# Patient Record
Sex: MALE | Race: WHITE | NOT HISPANIC OR LATINO | Employment: OTHER | URBAN - METROPOLITAN AREA
[De-identification: names, ages, dates, MRNs, and addresses within clinical notes are randomized per-mention and may not be internally consistent; named-entity substitution may affect disease eponyms.]

---

## 2018-01-25 ENCOUNTER — RECORDS - HEALTHEAST (OUTPATIENT)
Dept: LAB | Facility: CLINIC | Age: 73
End: 2018-01-25

## 2018-01-26 LAB
ALBUMIN SERPL-MCNC: 3.7 G/DL (ref 3.5–5)
ALP SERPL-CCNC: 64 U/L (ref 45–120)
ALT SERPL W P-5'-P-CCNC: 17 U/L (ref 0–45)
ANION GAP SERPL CALCULATED.3IONS-SCNC: 10 MMOL/L (ref 5–18)
AST SERPL W P-5'-P-CCNC: 14 U/L (ref 0–40)
BILIRUB SERPL-MCNC: 0.4 MG/DL (ref 0–1)
BUN SERPL-MCNC: 15 MG/DL (ref 8–28)
CALCIUM SERPL-MCNC: 9 MG/DL (ref 8.5–10.5)
CHLORIDE BLD-SCNC: 103 MMOL/L (ref 98–107)
CHOLEST SERPL-MCNC: 154 MG/DL
CO2 SERPL-SCNC: 28 MMOL/L (ref 22–31)
CREAT SERPL-MCNC: 0.72 MG/DL (ref 0.7–1.3)
CREAT UR-MCNC: 75.9 MG/DL
FASTING STATUS PATIENT QL REPORTED: NORMAL
GFR SERPL CREATININE-BSD FRML MDRD: >60 ML/MIN/1.73M2
GLUCOSE BLD-MCNC: 172 MG/DL (ref 70–125)
HDLC SERPL-MCNC: 42 MG/DL
LDLC SERPL CALC-MCNC: 96 MG/DL
MICROALBUMIN UR-MCNC: 3.65 MG/DL (ref 0–1.99)
MICROALBUMIN/CREAT UR: 48.1 MG/G
POTASSIUM BLD-SCNC: 4.6 MMOL/L (ref 3.5–5)
PROT SERPL-MCNC: 6.8 G/DL (ref 6–8)
SODIUM SERPL-SCNC: 141 MMOL/L (ref 136–145)
TRIGL SERPL-MCNC: 82 MG/DL

## 2018-09-21 ENCOUNTER — HOSPITAL ENCOUNTER (OUTPATIENT)
Dept: PHYSICAL MEDICINE AND REHAB | Facility: CLINIC | Age: 73
Discharge: HOME | End: 2018-09-21
Attending: PHYSICAL MEDICINE & REHABILITATION

## 2018-09-21 ENCOUNTER — AMBULATORY - HEALTHEAST (OUTPATIENT)
Dept: PHYSICAL MEDICINE AND REHAB | Facility: CLINIC | Age: 73
End: 2018-09-21

## 2018-09-21 DIAGNOSIS — M79.18 MYOFASCIAL PAIN: ICD-10-CM

## 2018-09-21 DIAGNOSIS — M19.041 OSTEOARTHRITIS OF BOTH HANDS, UNSPECIFIED OSTEOARTHRITIS TYPE: ICD-10-CM

## 2018-09-21 DIAGNOSIS — M54.2 NECK PAIN ON RIGHT SIDE: ICD-10-CM

## 2018-09-21 DIAGNOSIS — M54.2 NECK PAIN: ICD-10-CM

## 2018-09-21 DIAGNOSIS — M19.042 OSTEOARTHRITIS OF BOTH HANDS, UNSPECIFIED OSTEOARTHRITIS TYPE: ICD-10-CM

## 2018-09-21 DIAGNOSIS — M47.812 CERVICAL FACET JOINT SYNDROME: ICD-10-CM

## 2018-09-21 DIAGNOSIS — M47.812 FACET ARTHROPATHY, CERVICAL: ICD-10-CM

## 2018-09-21 ASSESSMENT — MIFFLIN-ST. JEOR: SCORE: 1518.71

## 2018-09-24 ENCOUNTER — HOSPITAL ENCOUNTER (OUTPATIENT)
Dept: PHYSICAL MEDICINE AND REHAB | Facility: CLINIC | Age: 73
Discharge: HOME | End: 2018-09-24
Attending: PHYSICAL MEDICINE & REHABILITATION

## 2018-09-24 DIAGNOSIS — M54.2 NECK PAIN ON RIGHT SIDE: ICD-10-CM

## 2018-09-24 DIAGNOSIS — M12.88 OTHER SPECIFIC ARTHROPATHIES, NOT ELSEWHERE CLASSIFIED, OTHER SPECIFIED SITE: ICD-10-CM

## 2018-09-24 DIAGNOSIS — Z79.4 TYPE 2 DIABETES MELLITUS WITHOUT COMPLICATION, WITH LONG-TERM CURRENT USE OF INSULIN (H): ICD-10-CM

## 2018-09-24 DIAGNOSIS — M47.812 CERVICAL FACET JOINT SYNDROME: ICD-10-CM

## 2018-09-24 DIAGNOSIS — E11.9 TYPE 2 DIABETES MELLITUS WITHOUT COMPLICATION, WITH LONG-TERM CURRENT USE OF INSULIN (H): ICD-10-CM

## 2018-09-24 DIAGNOSIS — M47.812 FACET ARTHROPATHY, CERVICAL: ICD-10-CM

## 2018-09-24 LAB — GLUCOSE BLDC GLUCOMTR-MCNC: 145 MG/DL (ref 70–125)

## 2018-09-25 ENCOUNTER — AMBULATORY - HEALTHEAST (OUTPATIENT)
Dept: PHYSICAL MEDICINE AND REHAB | Facility: CLINIC | Age: 73
End: 2018-09-25

## 2018-09-25 DIAGNOSIS — M19.041 OSTEOARTHRITIS OF BOTH HANDS, UNSPECIFIED OSTEOARTHRITIS TYPE: ICD-10-CM

## 2018-09-25 DIAGNOSIS — M19.042 OSTEOARTHRITIS OF BOTH HANDS, UNSPECIFIED OSTEOARTHRITIS TYPE: ICD-10-CM

## 2018-10-02 ENCOUNTER — OFFICE VISIT - HEALTHEAST (OUTPATIENT)
Dept: PHYSICAL THERAPY | Facility: REHABILITATION | Age: 73
End: 2018-10-02

## 2018-10-02 DIAGNOSIS — M54.2 NECK PAIN: ICD-10-CM

## 2018-10-02 DIAGNOSIS — M47.812 FACET ARTHROPATHY, CERVICAL: ICD-10-CM

## 2018-10-02 DIAGNOSIS — R29.898 DECREASED ROM OF NECK: ICD-10-CM

## 2018-10-09 ENCOUNTER — OFFICE VISIT - HEALTHEAST (OUTPATIENT)
Dept: PHYSICAL THERAPY | Facility: REHABILITATION | Age: 73
End: 2018-10-09

## 2018-10-09 DIAGNOSIS — M47.812 FACET ARTHROPATHY, CERVICAL: ICD-10-CM

## 2018-10-09 DIAGNOSIS — M54.2 NECK PAIN: ICD-10-CM

## 2018-10-09 DIAGNOSIS — R29.898 DECREASED ROM OF NECK: ICD-10-CM

## 2018-10-17 ENCOUNTER — HOSPITAL ENCOUNTER (OUTPATIENT)
Dept: PHYSICAL MEDICINE AND REHAB | Facility: CLINIC | Age: 73
Discharge: HOME | End: 2018-10-17
Attending: PHYSICAL MEDICINE & REHABILITATION

## 2018-10-17 ENCOUNTER — OFFICE VISIT - HEALTHEAST (OUTPATIENT)
Dept: OCCUPATIONAL THERAPY | Facility: REHABILITATION | Age: 73
End: 2018-10-17

## 2018-10-17 DIAGNOSIS — M47.812 FACET ARTHROPATHY, CERVICAL: ICD-10-CM

## 2018-10-17 DIAGNOSIS — M54.2 NECK PAIN ON RIGHT SIDE: ICD-10-CM

## 2018-10-17 DIAGNOSIS — R29.898 WEAKNESS OF BOTH HANDS: ICD-10-CM

## 2018-10-17 DIAGNOSIS — Z78.9 DECREASED ACTIVITIES OF DAILY LIVING (ADL): ICD-10-CM

## 2018-10-17 DIAGNOSIS — M79.641 PAIN IN BOTH HANDS: ICD-10-CM

## 2018-10-17 DIAGNOSIS — M79.642 PAIN IN BOTH HANDS: ICD-10-CM

## 2018-10-17 DIAGNOSIS — M47.812 CERVICAL FACET JOINT SYNDROME: ICD-10-CM

## 2019-05-12 ENCOUNTER — HOME CARE/HOSPICE - HEALTHEAST (OUTPATIENT)
Dept: HOME HEALTH SERVICES | Facility: HOME HEALTH | Age: 74
End: 2019-05-12

## 2019-05-18 ENCOUNTER — AMBULATORY - HEALTHEAST (OUTPATIENT)
Dept: INTENSIVE CARE | Facility: CLINIC | Age: 74
End: 2019-05-18

## 2019-05-18 ENCOUNTER — AMBULATORY - HEALTHEAST (OUTPATIENT)
Dept: MEDSURG UNIT | Facility: HOSPITAL | Age: 74
End: 2019-05-18

## 2019-05-18 DIAGNOSIS — J44.1 CHRONIC OBSTRUCTIVE PULMONARY DISEASE WITH ACUTE EXACERBATION (H): ICD-10-CM

## 2019-05-18 DIAGNOSIS — R91.8 OPACITY OF LUNG ON IMAGING STUDY: ICD-10-CM

## 2019-05-21 ENCOUNTER — COMMUNICATION - HEALTHEAST (OUTPATIENT)
Dept: PULMONOLOGY | Facility: OTHER | Age: 74
End: 2019-05-21

## 2019-05-23 LAB
ANION GAP SERPL CALCULATED.3IONS-SCNC: 5 MMOL/L (ref 3–14)
BUN SERPL-MCNC: 22 MG/DL (ref 7–30)
CALCIUM SERPL-MCNC: 8.6 MG/DL (ref 8.5–10.1)
CHLORIDE SERPL-SCNC: 107 MMOL/L (ref 94–109)
CO2 SERPL-SCNC: 28 MMOL/L (ref 20–32)
CREAT SERPL-MCNC: 0.8 MG/DL (ref 0.66–1.25)
GFR SERPL CREATININE-BSD FRML MDRD: 88 ML/MIN/{1.73_M2}
GLUCOSE SERPL-MCNC: 103 MG/DL (ref 70–99)
POTASSIUM SERPL-SCNC: 4.3 MMOL/L (ref 3.4–5.3)
SODIUM SERPL-SCNC: 140 MMOL/L (ref 133–144)

## 2019-05-23 PROCEDURE — 80048 BASIC METABOLIC PNL TOTAL CA: CPT | Performed by: FAMILY MEDICINE

## 2019-07-08 ENCOUNTER — HOSPITAL ENCOUNTER (OUTPATIENT)
Dept: CT IMAGING | Facility: HOSPITAL | Age: 74
Discharge: HOME | End: 2019-07-08
Attending: INTERNAL MEDICINE

## 2019-07-08 DIAGNOSIS — R91.8 OPACITY OF LUNG ON IMAGING STUDY: ICD-10-CM

## 2019-07-09 ENCOUNTER — RECORDS - HEALTHEAST (OUTPATIENT)
Dept: ADMINISTRATIVE | Facility: OTHER | Age: 74
End: 2019-07-09

## 2019-07-09 ENCOUNTER — AMBULATORY - HEALTHEAST (OUTPATIENT)
Dept: PULMONOLOGY | Facility: OTHER | Age: 74
End: 2019-07-09

## 2019-07-09 ENCOUNTER — OFFICE VISIT - HEALTHEAST (OUTPATIENT)
Dept: PULMONOLOGY | Facility: OTHER | Age: 74
End: 2019-07-09

## 2019-07-09 ENCOUNTER — RECORDS - HEALTHEAST (OUTPATIENT)
Dept: PULMONOLOGY | Facility: OTHER | Age: 74
End: 2019-07-09

## 2019-07-09 DIAGNOSIS — J44.1 CHRONIC OBSTRUCTIVE PULMONARY DISEASE WITH (ACUTE) EXACERBATION (H): ICD-10-CM

## 2019-07-09 DIAGNOSIS — J96.01 ACUTE RESPIRATORY FAILURE WITH HYPOXIA (H): ICD-10-CM

## 2019-07-09 DIAGNOSIS — J44.1 CHRONIC OBSTRUCTIVE PULMONARY DISEASE WITH ACUTE EXACERBATION (H): ICD-10-CM

## 2019-07-09 DIAGNOSIS — J18.9 PNEUMONIA DUE TO INFECTIOUS ORGANISM, UNSPECIFIED LATERALITY, UNSPECIFIED PART OF LUNG: ICD-10-CM

## 2019-07-09 LAB — HGB BLD-MCNC: 15.5 G/DL

## 2019-07-09 RX ORDER — ALBUTEROL SULFATE 90 UG/1
2 AEROSOL, METERED RESPIRATORY (INHALATION) EVERY 4 HOURS PRN
Qty: 1 INHALER | Refills: 6 | Status: SHIPPED | OUTPATIENT
Start: 2019-07-09 | End: 2023-02-21

## 2019-07-09 ASSESSMENT — MIFFLIN-ST. JEOR: SCORE: 1528.24

## 2019-07-25 ENCOUNTER — RECORDS - HEALTHEAST (OUTPATIENT)
Dept: LAB | Facility: CLINIC | Age: 74
End: 2019-07-25

## 2019-07-25 LAB
ALBUMIN SERPL-MCNC: 4 G/DL (ref 3.5–5)
ALP SERPL-CCNC: 63 U/L (ref 45–120)
ALT SERPL W P-5'-P-CCNC: 28 U/L (ref 0–45)
ANION GAP SERPL CALCULATED.3IONS-SCNC: 9 MMOL/L (ref 5–18)
AST SERPL W P-5'-P-CCNC: 19 U/L (ref 0–40)
BILIRUB SERPL-MCNC: 0.4 MG/DL (ref 0–1)
BUN SERPL-MCNC: 13 MG/DL (ref 8–28)
CALCIUM SERPL-MCNC: 9.5 MG/DL (ref 8.5–10.5)
CHLORIDE BLD-SCNC: 102 MMOL/L (ref 98–107)
CHOLEST SERPL-MCNC: 146 MG/DL
CO2 SERPL-SCNC: 28 MMOL/L (ref 22–31)
CREAT SERPL-MCNC: 0.81 MG/DL (ref 0.7–1.3)
FASTING STATUS PATIENT QL REPORTED: NORMAL
GFR SERPL CREATININE-BSD FRML MDRD: >60 ML/MIN/1.73M2
GLUCOSE BLD-MCNC: 161 MG/DL (ref 70–125)
HDLC SERPL-MCNC: 40 MG/DL
LDLC SERPL CALC-MCNC: 92 MG/DL
POTASSIUM BLD-SCNC: 5.1 MMOL/L (ref 3.5–5)
PROT SERPL-MCNC: 6.7 G/DL (ref 6–8)
SODIUM SERPL-SCNC: 139 MMOL/L (ref 136–145)
TRIGL SERPL-MCNC: 70 MG/DL

## 2019-11-05 ENCOUNTER — RECORDS - HEALTHEAST (OUTPATIENT)
Dept: ADMINISTRATIVE | Facility: OTHER | Age: 74
End: 2019-11-05

## 2020-01-30 ENCOUNTER — OFFICE VISIT - HEALTHEAST (OUTPATIENT)
Dept: PULMONOLOGY | Facility: OTHER | Age: 75
End: 2020-01-30

## 2020-01-30 DIAGNOSIS — J44.9 COPD, GROUP A, BY GOLD 2017 CLASSIFICATION (H): ICD-10-CM

## 2020-01-30 ASSESSMENT — MIFFLIN-ST. JEOR: SCORE: 1525.06

## 2020-11-27 ENCOUNTER — RECORDS - HEALTHEAST (OUTPATIENT)
Dept: LAB | Facility: CLINIC | Age: 75
End: 2020-11-27

## 2020-11-27 LAB
ALBUMIN SERPL-MCNC: 3.9 G/DL (ref 3.5–5)
ALP SERPL-CCNC: 75 U/L (ref 45–120)
ALT SERPL W P-5'-P-CCNC: 23 U/L (ref 0–45)
ANION GAP SERPL CALCULATED.3IONS-SCNC: 8 MMOL/L (ref 5–18)
AST SERPL W P-5'-P-CCNC: 17 U/L (ref 0–40)
BILIRUB SERPL-MCNC: 0.3 MG/DL (ref 0–1)
BUN SERPL-MCNC: 10 MG/DL (ref 8–28)
CALCIUM SERPL-MCNC: 8.8 MG/DL (ref 8.5–10.5)
CHLORIDE BLD-SCNC: 100 MMOL/L (ref 98–107)
CHOLEST SERPL-MCNC: 144 MG/DL
CO2 SERPL-SCNC: 28 MMOL/L (ref 22–31)
CREAT SERPL-MCNC: 0.91 MG/DL (ref 0.7–1.3)
FASTING STATUS PATIENT QL REPORTED: ABNORMAL
GFR SERPL CREATININE-BSD FRML MDRD: >60 ML/MIN/1.73M2
GLUCOSE BLD-MCNC: 228 MG/DL (ref 70–125)
HDLC SERPL-MCNC: 37 MG/DL
LDLC SERPL CALC-MCNC: 76 MG/DL
POTASSIUM BLD-SCNC: 4.7 MMOL/L (ref 3.5–5)
PROT SERPL-MCNC: 7 G/DL (ref 6–8)
SODIUM SERPL-SCNC: 136 MMOL/L (ref 136–145)
TRIGL SERPL-MCNC: 153 MG/DL

## 2021-02-02 ENCOUNTER — OFFICE VISIT - HEALTHEAST (OUTPATIENT)
Dept: PULMONOLOGY | Facility: OTHER | Age: 76
End: 2021-02-02

## 2021-02-02 DIAGNOSIS — J44.9 COPD, GROUP A, BY GOLD 2017 CLASSIFICATION (H): ICD-10-CM

## 2021-05-28 NOTE — PROGRESS NOTES
Received oxygen referral to [facility] via on call text page at 10:40am.    Gathered qualifying walk test, qualifying diagnosis documentation and order with qualifying diagnosis.   Called charge RT at 11am to confirm we have the necessary documentation. Patient is just waiting for home O2 to discharge.  Verified patient insurance. Called patient at 11:30am to offer choice and answer questions. Patient would like to have [facility] setup home oxygen.     ETA to bedside within 2hrs.

## 2021-05-30 NOTE — PATIENT INSTRUCTIONS - HE
It was a pleasure seeing you today.    Continue the anoro and as needed albuterol.    You no longer need oxygen, I have discontinued it and they will remove it from your home.    You received your second pneumonia vaccine today.        Gwendolyn Escobar MD  Pulmonary and Critical Care Medicine  [facility]  Office: [phone]  Pager: [phone]

## 2021-05-30 NOTE — PROGRESS NOTES
Oxygen saturation walk test    Patient oxygen saturation on RA at rest is 94%.  Oxygen saturation after ambulating 300ft on RA is 90%.      DME Provider: Regulo    Patient is ambulatory within his/her home.

## 2021-05-30 NOTE — PROGRESS NOTES
Pulmonary Clinic Follow-up Visit    Assessment & Plan:  Kelechi is a 73 y M with an 80+ pack year tobacco history, history of diabetes, HTN, HLD, GOLD A COPD/emphysema, with moderate-severe obstruction, with recent admission for CAP, status post abx treatment, and discharged on 1 LPM oxygen, who presents for follow-up.       PLAN:    Doing well on LAMA/LABA with anoro, minimal albuterol use    CT scan shows infiltrates have resolved - no further imaging indicated    Quit smoking > 15 years ago, does not qualify for screening scans    Per walking oximetry today, does not require exertional oxygen - will discontinue    Prevnar given today, and will need re-booster pneumovax in 1 year, was received before 65 yo      I will see him back in 6 months for follow-up.       Gwendolyn Escobar MD  Pulmonary and Critical Care Medicine  [facility]  Office: [phone]  Pager: [phone]    ----------------------------    HPI:   73 y M with DM, HTN, HLD, COPD/emphysema seen by myself inpatient for CAP and an AECOPD.    Flu and sputum culture were negative.     Feeling back to baseline after hospital stay. On Anoro, using daily and albuterol not needed. He was discharged on 1 LPM oxygen with exertion, that he has started using only at night.     CT scan shows interval resolution of lung infiltrates.    CAT 8 today.      ROS:  A 12-system review was obtained and was negative with the exception of the symptoms endorsed in the history of present illness.    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 1 Inhaler 0     ANORO ELLIPTA 62.5-25 mcg/actuation inhaler 1 puff daily.  5     aspirin 81 MG EC tablet Take 81 mg by mouth daily.       glipiZIDE (GLUCOTROL) 10 MG tablet Take 10 mg by mouth 2 (two) times a day before meals.       insulin glargine (BASAGLAR KWIKPEN U-100 INSULIN) 100 unit/mL (3 mL) pen Inject 44 Units under the skin at bedtime.       lisinopril (PRINIVIL,ZESTRIL) 10 MG tablet Take 10 mg by mouth daily.       metFORMIN (GLUCOPHAGE) 500 MG tablet Take 1,000 mg by mouth 2 (two) times a day with meals.       OXYGEN-AIR DELIVERY SYSTEMS [facility] [place] [place] Use As Directed. 1L at night  [place]       primidone (MYSOLINE) 50 MG tablet Take 50 mg by mouth at bedtime.       simvastatin (ZOCOR) 40 MG tablet Take 40 mg by mouth at bedtime.       tiotropium-olodaterol (STIOLTO RESPIMAT) 2.5-2.5 mcg/actuation Mist inhaler Inhale 2 Inhalation daily. 4 g 0     No current facility-administered medications for this visit.      Physical Exam:  /68   Pulse 96   Resp 24   Ht 5' 5\" (1.651 m)   Wt 192 lb 1.6 oz (87.1 kg)   SpO2 94% Comment: RA resting  BMI 31.97 kg/m    Gen: Alert, oriented, no distress, hoarse voice  HEENT: nasal turbinates are unremarkable, no oropharyngeal lesions, no cervical or supraclavicular lymphadenopathy  CV: RRR, no M/G/R  Resp: Diminished but clear, no focal crackles or wheezes  Abd: obese, soft, nontender, no palpable organomegaly  Skin: no apparent rashes  Ext: no cyanosis, clubbing or edema  Neuro: alert, nonfocal    Labs:  Reviewed    Imaging studies:  Personally reviewed:    7/8/19 CT Chest:  LUNGS AND PLEURA: Mild apical emphysema. Previously seen left upper lobe infiltrates have resolved. Scattered diminutive pulmonary nodules are unchanged.     MEDIASTINUM: Small lymph nodes are unchanged and not enlarged by size criteria. Coronary artery calcification.     LIMITED UPPER ABDOMEN: " Negative.     MUSCULOSKELETAL: Negative.     CONCLUSION:   1.  Previously seen left upper lobe infiltrates have resolved.    7/9/19 PFT's  FEV1/FVC 61 FEV1 52% FVC 64%  Neg bronchodilator response  TLC 89%  % RV/%  DLCO bjorn 71%    Moderate-severe obstruction   Neg BD response  Air trapping  Mild reduction in diffusion

## 2021-05-30 NOTE — PROGRESS NOTES
DME Provider: Regulo  Date Called: 7/9/19  Ordering Provider: Dr. Escobar  Equipment ordered: discontinue oxygen

## 2021-06-01 VITALS — HEIGHT: 65 IN | WEIGHT: 190 LBS | BODY MASS INDEX: 31.65 KG/M2

## 2021-06-03 VITALS — BODY MASS INDEX: 32.01 KG/M2 | HEIGHT: 65 IN | WEIGHT: 192.1 LBS

## 2021-06-04 VITALS
OXYGEN SATURATION: 96 % | SYSTOLIC BLOOD PRESSURE: 132 MMHG | BODY MASS INDEX: 31.89 KG/M2 | HEIGHT: 65 IN | RESPIRATION RATE: 24 BRPM | HEART RATE: 80 BPM | WEIGHT: 191.4 LBS | DIASTOLIC BLOOD PRESSURE: 68 MMHG

## 2021-06-05 VITALS
OXYGEN SATURATION: 95 % | SYSTOLIC BLOOD PRESSURE: 142 MMHG | HEART RATE: 93 BPM | WEIGHT: 192.2 LBS | BODY MASS INDEX: 31.98 KG/M2 | DIASTOLIC BLOOD PRESSURE: 72 MMHG

## 2021-06-05 NOTE — PROGRESS NOTES
Pulmonary Clinic Follow-up Visit    Assessment & Plan:  Kelechi is a 74 y M with an 80+ pack year tobacco history, history of diabetes, HTN, HLD, GOLD A COPD/emphysema, with moderate-severe obstruction, with recent admission for CAP, status post abx treatment, and discharged on 1 LPM oxygen, who presents for follow-up.    Off oxygen since his hospital stay. Doing well, no exacerbations since last visit, anoro generally working well.        PLAN:    Doing well on LAMA/LABA with anoro, minimal albuterol use. I suggested he bring his inhaler to his pharmacist to look it if he thinks it may be malfunctioning. He has not had any increase in respiratory symptoms related to this.    No exertional issues, minimal symptom burden, minimal exacerbations    Quit smoking > 15 years ago, does not qualify for screening scans    Flu vaccine was given today      I will see him back in 1 year, he will call me if any new issues arise.      Gwendolyn Escobar MD  Pulmonary and Critical Care Medicine  [facility]  Office: [phone]  Pager: [phone]    ----------------------------  CC: COPD follow-up    Interval HPI:   Not requiring albuterol. No recent exacerbations, or ER visits for breathing  No new medical updates  Feels anoro generally works well for him. Current inhaler he is questioning if medicine is being relased    ----------------------  73 y M with DM, HTN, HLD, COPD/emphysema seen by myself inpatient for CAP and an AECOPD.    Flu and sputum culture were negative.     Feeling back to baseline after hospital stay. On Anoro, using daily and albuterol not needed. He was discharged on 1 LPM oxygen with exertion, that he has started using only at night.     CT scan shows interval resolution of lung infiltrates.    CAT 8 today.      ROS:  A 12-system review was obtained and was negative with the exception of the symptoms endorsed in the history of present illness.    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  Denies alcohol or drug abuse  Lives in a town home with his wife  Has 1 dog  No recent travel    Physical Exam:  /68   Pulse 80   Resp 24   Ht 5' 5\" (1.651 m)   Wt 191 lb 6.4 oz (86.8 kg)   SpO2 96% Comment: RA  BMI 31.85 kg/m    Gen: Alert, oriented, no distress, hoarse voice  HEENT: nasal turbinates are unremarkable, no oropharyngeal lesions, no cervical or supraclavicular lymphadenopathy  CV: RRR, no M/G/R  Resp: Diminished but clear, no focal crackles or wheezes  Abd: obese, soft, nontender, no palpable organomegaly  Skin: no apparent rashes  Ext: no cyanosis, clubbing or edema  Neuro: alert, nonfocal    Labs:  Reviewed    Imaging studies:  Personally reviewed:    No new imaging    7/8/19 CT Chest:  LUNGS AND PLEURA: Mild apical emphysema. Previously seen left upper lobe infiltrates have resolved. Scattered diminutive pulmonary nodules are unchanged.     MEDIASTINUM: Small lymph nodes are unchanged and not enlarged by size criteria. Coronary artery calcification.     LIMITED UPPER ABDOMEN: Negative.     MUSCULOSKELETAL: Negative.     CONCLUSION:   1.  Previously seen left upper lobe infiltrates have resolved.    7/9/19 PFT's  FEV1/FVC 61 FEV1 52% FVC 64%  Neg bronchodilator response  TLC 89%  % RV/%  DLCO bjorn 71%    Moderate-severe obstruction   Neg BD response  Air trapping  Mild reduction in diffusion      "

## 2021-06-05 NOTE — PATIENT INSTRUCTIONS - HE
It was a pleasure seeing you today.    Continue the anoro inhaler and as needed albuterol. Stop by your pharmacist and discuss the inhaler with them if it seems it is not functioning.     You received your flu shot today.    I will see you back in 1 year. Please call me if you have breathing issues in the interim.         Gwendolyn Escobar MD  Pulmonary and Critical Care Medicine  [facility]  Office: [phone]  Pager: [phone]

## 2021-06-14 NOTE — PATIENT INSTRUCTIONS - HE
It was a pleasure to see you today.    For your COPD, I recommend you continue the anoro, I have refilled this to your pharmacy.    Continue as needed albuterol. If you are using this every day please call my office.    Follow up in 1 year      Gwendolyn Escobar MD  Pulmonary and Critical Care Medicine  [facility]  Office: [phone]

## 2021-06-14 NOTE — PROGRESS NOTES
Pulmonary Clinic Follow-up Visit    Assessment & Plan:  Kelechi is a 75 y M with an 80+ pack year tobacco history, history of diabetes, HTN, HLD, GOLD A COPD/emphysema, with moderate-severe obstruction who presents for follow-up.    Doing well, baseline exertional shortness of breath with heavy activity, no exacerbations in >1 year.       PLAN:    Doing well on LAMA/LABA with anoro, no albuterol use. 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      Gwendolyn Escobar MD  Pulmonary and Critical Care Medicine  [facility]  Office: [phone]    ----------------------------  CC: COPD follow-up    Interval HPI:   Not requiring albuterol. No recent exacerbations, or ER visits for breathing, has had a good year  No new medical updates  Continues to provide full time care for his wife at home    CAT 5    ----------------------  73 y M with DM, HTN, HLD, COPD/emphysema seen by myself inpatient for CAP and an AECOPD.    Flu and sputum culture were negative.     Feeling back to baseline after hospital stay. On Anoro, using daily and albuterol not needed. He was discharged on 1 LPM oxygen with exertion, that he has started using only at night.     CT scan shows interval resolution of lung infiltrates.    CAT 8 today.    ROS:  A 12-system review was obtained and was negative with the exception of the symptoms endorsed in the history of present illness.    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  Denies  alcohol or drug abuse  Lives in a town home with his wife  Has 1 dog  No recent travel    Physical Exam:  /72   Pulse 93   Wt 192 lb 3.2 oz (87.2 kg)   SpO2 95% Comment: RA  BMI 31.98 kg/m    Gen: Alert, oriented, no distress, hoarse voice  HEENT: nasal turbinates are unremarkable, no oropharyngeal lesions, no cervical or supraclavicular lymphadenopathy  CV: RRR, no M/G/R  Resp: Diminished but clear, no focal crackles or wheezes  Abd: obese, soft, nontender, no palpable organomegaly  Skin: no apparent rashes  Ext: no cyanosis, clubbing or edema  Neuro: alert, nonfocal    Labs:  Reviewed    Imaging studies:  Personally reviewed:    No new imaging    7/8/19 CT Chest:  LUNGS AND PLEURA: Mild apical emphysema. Previously seen left upper lobe infiltrates have resolved. Scattered diminutive pulmonary nodules are unchanged.     MEDIASTINUM: Small lymph nodes are unchanged and not enlarged by size criteria. Coronary artery calcification.     LIMITED UPPER ABDOMEN: Negative.     MUSCULOSKELETAL: Negative.     CONCLUSION:   1.  Previously seen left upper lobe infiltrates have resolved.    7/9/19 PFT's  FEV1/FVC 61 FEV1 52% FVC 64%  Neg bronchodilator response  TLC 89%  % RV/%  DLCO bjorn 71%    Moderate-severe obstruction   Neg BD response  Air trapping  Mild reduction in diffusion

## 2021-06-16 PROBLEM — I10 HYPERTENSION: Status: ACTIVE | Noted: 2019-05-11

## 2021-06-16 PROBLEM — J18.9 CAP (COMMUNITY ACQUIRED PNEUMONIA): Status: ACTIVE | Noted: 2018-07-26

## 2021-06-16 PROBLEM — A41.9 SEPSIS (H): Status: ACTIVE | Noted: 2018-07-26

## 2021-06-16 PROBLEM — E11.9 TYPE 2 DIABETES MELLITUS WITHOUT COMPLICATION, WITH LONG-TERM CURRENT USE OF INSULIN (H): Status: ACTIVE | Noted: 2018-07-26

## 2021-06-16 PROBLEM — J96.01 ACUTE RESPIRATORY FAILURE WITH HYPOXIA (H): Status: ACTIVE | Noted: 2018-07-26

## 2021-06-16 PROBLEM — Z79.4 TYPE 2 DIABETES MELLITUS WITHOUT COMPLICATION, WITH LONG-TERM CURRENT USE OF INSULIN (H): Status: ACTIVE | Noted: 2018-07-26

## 2021-06-16 PROBLEM — R00.0 SINUS TACHYCARDIA: Status: ACTIVE | Noted: 2019-05-11

## 2021-06-19 NOTE — LETTER
Letter by Gwendolyn Escobar MD at      Author: Gwendolyn Escobar MD Service: -- Author Type: --    Filed:  Encounter Date: 5/21/2019 Status: (Other)         Kelechi Kendrick  [address]  [place] [address]    May 21, 2019    Dear  Mireille,    Welcome to [facility]! Your appointment information is below.   Please bring the following to your appointment:    Insurance Card, so we may scan it for our records    Drivers license or valid ID, so we may scan it for our records    Co-pay (as applicable per your insurance plan)    A current list of your medications including over the counter products such as vitamins and supplements    Your medical records including copies of X-Ray films if you are transferring your care from another clinic.  If you do not have your records, please fill out the release of information form and we will request those records.     Provider: Gwendolyn Escobar MD  Appointment Date:   Tuesday, July 9, 2019  Arrival Time:  2:00 PFT,  3:00 dr appt.    Location: [address]        [facility], [address]    **Please allow adequate time for your commute and parking. If you are more than 10 minutes late, you may be asked to reschedule.     If you need to cancel or reschedule your appointment, please notify us at least 24 hours prior to your appointment time so we are able to make this time available for another patient.    Thank you for choosing the [facility] for your health care needs. If you have any questions, please do not hesitate to contact us at any time at   [phone]. We look forward to caring for you.     Sincerely,     [facility] staff

## 2021-06-19 NOTE — LETTER
Letter by Gwendloyn Escobar MD at      Author: Gwendolyn Escobar MD Service: -- Author Type: --    Filed:  Encounter Date: 7/9/2019 Status: (Other)         Ramiro Person MD  [address] ,  [id number]  formerly [facility] [address]                                  July 9, 2019    Patient: Kelechi Kendrick   MR Number: [id number]   YOB: 1945   Date of Visit: 7/9/2019     Dear Dr. Raza MD:    Thank you for referring Kelechi Kendrick to me for evaluation. Below are the relevant portions of my assessment and plan of care.    If you have questions, please do not hesitate to call me. I look forward to following Kelechi along with you.    Sincerely,        Gwendolyn Escobar MD          CC  No Gwendolyn Aguila MD  7/9/2019  3:44 PM  Sign at close encounter  Pulmonary Clinic Follow-up Visit    Assessment & Plan:  Kelechi is a 73 y M with an 80+ pack year tobacco history, history of diabetes, HTN, HLD, GOLD A COPD/emphysema, with moderate-severe obstruction, with recent admission for CAP, status post abx treatment, and discharged on 1 LPM oxygen, who presents for follow-up.       PLAN:    Doing well on LAMA/LABA with anoro, minimal albuterol use    CT scan shows infiltrates have resolved - no further imaging indicated    Quit smoking > 15 years ago, does not qualify for screening scans    Per walking oximetry today, does not require exertional oxygen - will discontinue    Prevnar given today, and will need re-booster pneumovax in 1 year, was received before 63 yo      I will see him back in 6 months for follow-up.       Gwendolyn Escobar MD  Pulmonary and Critical Care Medicine  [facility] [place]  Office: [phone]  Pager: [phone]    ----------------------------    HPI:   73 y M with DM, HTN, HLD, COPD/emphysema seen by myself inpatient for CAP and an AECOPD.    Flu and sputum culture were negative.     Feeling back to baseline after hospital stay. On Anoro, using daily and  "albuterol not needed. He was discharged on 1 LPM oxygen with exertion, that he has started using only at night.     CT scan shows interval resolution of lung infiltrates.    CAT 8 today.      ROS:  A 12-system review was obtained and was negative with the exception of the symptoms endorsed in the history of present illness.    PMH:  Past Medical History:   Diagnosis Date   ? Diabetes mellitus, type II (H)    ? Hyperlipidemia    ? 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 albuterol (PROAIR HFA;PROVENTIL HFA;VENTOLIN HFA) 90 mcg/actuation inhaler Inhale 2 puffs every 4 (four) hours as needed for wheezing or shortness of breath. 1 Inhaler 0   ? ANORO ELLIPTA 62.5-25 mcg/actuation inhaler 1 puff daily.  5   ? aspirin 81 MG EC tablet Take 81 mg by mouth daily.     ? glipiZIDE (GLUCOTROL) 10 MG tablet Take 10 mg by mouth 2 (two) times a day before meals.     ? insulin glargine (BASAGLAR KWIKPEN U-100 INSULIN) 100 unit/mL (3 mL) pen Inject 44 Units under the skin at bedtime.     ? lisinopril (PRINIVIL,ZESTRIL) 10 MG tablet Take 10 mg by mouth daily.     ? metFORMIN (GLUCOPHAGE) 500 MG tablet Take 1,000 mg by mouth 2 (two) times a day with meals.     ? OXYGEN-AIR DELIVERY SYSTEMS [facility] Use As Directed. 1L at night  [place]     ? primidone (MYSOLINE) 50 MG tablet Take 50 mg by mouth at bedtime.     ? simvastatin (ZOCOR) 40 MG tablet Take 40 mg by mouth at bedtime.     ? tiotropium-olodaterol (STIOLTO RESPIMAT) 2.5-2.5 mcg/actuation Mist inhaler Inhale 2 Inhalation daily. 4 g 0     No current facility-administered medications for this visit.      Physical Exam:  /68   Pulse 96   Resp 24   Ht 5' 5\" (1.651 m)   Wt 192 lb 1.6 oz (87.1 kg)   SpO2 94% " Comment: RA resting  BMI 31.97 kg/m     Gen: Alert, oriented, no distress, hoarse voice  HEENT: nasal turbinates are unremarkable, no oropharyngeal lesions, no cervical or supraclavicular lymphadenopathy  CV: RRR, no M/G/R  Resp: Diminished but clear, no focal crackles or wheezes  Abd: obese, soft, nontender, no palpable organomegaly  Skin: no apparent rashes  Ext: no cyanosis, clubbing or edema  Neuro: alert, nonfocal    Labs:  Reviewed    Imaging studies:  Personally reviewed:    7/8/19 CT Chest:  LUNGS AND PLEURA: Mild apical emphysema. Previously seen left upper lobe infiltrates have resolved. Scattered diminutive pulmonary nodules are unchanged.     MEDIASTINUM: Small lymph nodes are unchanged and not enlarged by size criteria. Coronary artery calcification.     LIMITED UPPER ABDOMEN: Negative.     MUSCULOSKELETAL: Negative.     CONCLUSION:   1.  Previously seen left upper lobe infiltrates have resolved.    7/9/19 PFT's  FEV1/FVC 61 FEV1 52% FVC 64%  Neg bronchodilator response  TLC 89%  % RV/%  DLCO bjorn 71%    Moderate-severe obstruction   Neg BD response  Air trapping  Mild reduction in diffusion
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
## 2021-06-20 NOTE — LETTER
Letter by Gwendolyn Escobar MD at      Author: Gwendolyn Escobar MD Service: -- Author Type: --    Filed:  Encounter Date: 1/30/2020 Status: (Other)         Ramiro Person MD  [address] ,  [address]  [facility] [id number]                                  January 30, 2020    Patient: Kelechi Kendrick   MR Number: [id number]   YOB: 1945   Date of Visit: 1/30/2020     Dear Dr. Raza MD:    Thank you for referring Kelechi Kendrick to me for evaluation. Below are the relevant portions of my assessment and plan of care.    If you have questions, please do not hesitate to call me. I look forward to following Kelechi along with you.    Sincerely,        Gwendolyn Escobar MD          CC  No Recipients  Gwendolyn Escobar MD  1/30/2020  2:26 PM  Sign when Signing Visit  Pulmonary Clinic Follow-up Visit    Assessment & Plan:  Kelechi is a 74 y M with an 80+ pack year tobacco history, history of diabetes, HTN, HLD, GOLD A COPD/emphysema, with moderate-severe obstruction, with recent admission for CAP, status post abx treatment, and discharged on 1 LPM oxygen, who presents for follow-up.    Off oxygen since his hospital stay. Doing well, no exacerbations since last visit, anoro generally working well.        PLAN:    Doing well on LAMA/LABA with anoro, minimal albuterol use. I suggested he bring his inhaler to his pharmacist to look it if he thinks it may be malfunctioning. He has not had any increase in respiratory symptoms related to this.    No exertional issues, minimal symptom burden, minimal exacerbations    Quit smoking > 15 years ago, does not qualify for screening scans    Flu vaccine was given today      I will see him back in 1 year, he will call me if any new issues arise.      Gwendolyn Escobar MD  Pulmonary and Critical Care Medicine  [facility]  Office: [phone]  Pager: [phone]    ----------------------------  CC: COPD follow-up    Interval HPI:   Not requiring  "albuterol. No recent exacerbations, or ER visits for breathing  No new medical updates  Feels anoro generally works well for him. Current inhaler he is questioning if medicine is being relased    ----------------------  73 y M with DM, HTN, HLD, COPD/emphysema seen by myself inpatient for CAP and an AECOPD.    Flu and sputum culture were negative.     Feeling back to baseline after hospital stay. On Anoro, using daily and albuterol not needed. He was discharged on 1 LPM oxygen with exertion, that he has started using only at night.     CT scan shows interval resolution of lung infiltrates.    CAT 8 today.      ROS:  A 12-system review was obtained and was negative with the exception of the symptoms endorsed in the history of present illness.    PMH:  Past Medical History:   Diagnosis Date   ? Diabetes mellitus, type II (H)    ? Hyperlipidemia    ? 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  Denies alcohol or drug abuse  Lives in a town home with his wife  Has 1 dog  No recent travel    Physical Exam:  /68   Pulse 80   Resp 24   Ht 5' 5\" (1.651 m)   Wt 191 lb 6.4 oz (86.8 kg)   SpO2 96% Comment: RA  BMI 31.85 kg/m     Gen: Alert, oriented, no distress, hoarse voice  HEENT: nasal turbinates are unremarkable, no oropharyngeal lesions, no cervical or supraclavicular lymphadenopathy  CV: RRR, no M/G/R  Resp: Diminished but clear, no focal crackles or wheezes  Abd: obese, soft, nontender, no palpable organomegaly  Skin: no apparent rashes  Ext: no cyanosis, clubbing or edema  Neuro: alert, nonfocal    Labs:  Reviewed    Imaging studies:  Personally reviewed:    No new imaging    7/8/19 CT Chest:  LUNGS AND PLEURA: Mild apical emphysema. Previously seen left upper lobe infiltrates have resolved. Scattered diminutive pulmonary nodules are unchanged.     MEDIASTINUM: Small lymph nodes are unchanged and not enlarged by size criteria. Coronary " artery calcification.     LIMITED UPPER ABDOMEN: Negative.     MUSCULOSKELETAL: Negative.     CONCLUSION:   1.  Previously seen left upper lobe infiltrates have resolved.    7/9/19 PFT's  FEV1/FVC 61 FEV1 52% FVC 64%  Neg bronchodilator response  TLC 89%  % RV/%  DLCO bjorn 71%    Moderate-severe obstruction   Neg BD response  Air trapping  Mild reduction in diffusion

## 2021-06-21 NOTE — PROGRESS NOTES
[facility] Discharge Summary  Patient Name: Kelechi Kendrick  Date: 11/19/2018  Referral Diagnosis: pain in both hands  Referring provider: Brook Reddy*  Visit Diagnosis:   1. Pain in both hands     2. Weakness of both hands     3. Decreased activities of daily living (ADL)         Goal status: Unable to assess as patient did not return.    Patient was seen for 1 visit. The patient discontinued therapy, did not return.    The patient will need a new referral to resume.    Thank you for your referral.  Edna Cesar  11/19/2018  10:44 AM

## 2021-06-21 NOTE — LETTER
Letter by Gwendolyn Escobar MD at      Author: Gwendolyn Escobar MD Service: -- Author Type: --    Filed:  Encounter Date: 2/2/2021 Status: (Other)         Ramiro Person MD  [address] ,  [address]  [facility] [id number]                                  February 2, 2021    Patient: Kelechi Kendrick   MR Number: [id number]   YOB: 1945   Date of Visit: 2/2/2021     Dear Dr. Raza MD:    Thank you for referring Kelechi Kendrick to me for evaluation. Below are the relevant portions of my assessment and plan of care.    If you have questions, please do not hesitate to call me. I look forward to following Kelechi along with you.    Sincerely,        Gwendolyn Escobar MD          CC  No Recipients  Gwendolyn Escobar MD  2/2/2021 10:02 AM  Sign when Signing Visit  Pulmonary Clinic Follow-up Visit    Assessment & Plan:  Kelechi is a 75 y M with an 80+ pack year tobacco history, history of diabetes, HTN, HLD, GOLD A COPD/emphysema, with moderate-severe obstruction who presents for follow-up.    Doing well, baseline exertional shortness of breath with heavy activity, no exacerbations in >1 year.       PLAN:    Doing well on LAMA/LABA with anoro, no albuterol use. 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      Gwendolyn Escobar MD  Pulmonary and Critical Care Medicine  [facility]  Office: [phone]    ----------------------------  CC: COPD follow-up    Interval HPI:   Not requiring albuterol. No recent exacerbations, or ER visits for breathing, has had a good year  No new medical updates  Continues to provide full time care for his wife at  home    CAT 5    ----------------------  73 y M with DM, HTN, HLD, COPD/emphysema seen by myself inpatient for CAP and an AECOPD.    Flu and sputum culture were negative.     Feeling back to baseline after hospital stay. On Anoro, using daily and albuterol not needed. He was discharged on 1 LPM oxygen with exertion, that he has started using only at night.     CT scan shows interval resolution of lung infiltrates.    CAT 8 today.    ROS:  A 12-system review was obtained and was negative with the exception of the symptoms endorsed in the history of present illness.    PMH:  Past Medical History:   Diagnosis Date   ? Diabetes mellitus, type II (H)    ? Hyperlipidemia    ? 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  Denies alcohol or drug abuse  Lives in a town home with his wife  Has 1 dog  No recent travel    Physical Exam:  /72   Pulse 93   Wt 192 lb 3.2 oz (87.2 kg)   SpO2 95% Comment: RA  BMI 31.98 kg/m    Gen: Alert, oriented, no distress, hoarse voice  HEENT: nasal turbinates are unremarkable, no oropharyngeal lesions, no cervical or supraclavicular lymphadenopathy  CV: RRR, no M/G/R  Resp: Diminished but clear, no focal crackles or wheezes  Abd: obese, soft, nontender, no palpable organomegaly  Skin: no apparent rashes  Ext: no cyanosis, clubbing or edema  Neuro: alert, nonfocal    Labs:  Reviewed    Imaging studies:  Personally reviewed:    No new imaging    7/8/19 CT Chest:  LUNGS AND PLEURA: Mild apical emphysema. Previously seen left upper lobe infiltrates have resolved. Scattered diminutive pulmonary nodules are unchanged.     MEDIASTINUM: Small lymph nodes are unchanged and not enlarged by size criteria. Coronary artery calcification.     LIMITED UPPER ABDOMEN: Negative.     MUSCULOSKELETAL: Negative.     CONCLUSION:   1.  Previously seen left upper lobe infiltrates have resolved.    7/9/19 PFT's  FEV1/FVC 61 FEV1 52% FVC  64%  Neg bronchodilator response  TLC 89%  % RV/%  DLCO bjorn 71%    Moderate-severe obstruction   Neg BD response  Air trapping  Mild reduction in diffusion

## 2021-06-21 NOTE — PROGRESS NOTES
[facility] Discharge Summary  Patient Name: Kelechi Kendrick  Date: 11/27/2018  Referral Diagnosis:   Neck pain on right side [M54.2]  - Primary       Cervical facet joint syndrome [M12.88]       Facet arthropathy, cervical [M12.88]       Referring provider: Brook Reddy*  Visit Diagnosis:   1. Facet arthropathy, cervical  Cervical Traction   2. Neck pain  Cervical Traction   3. Decreased ROM of neck  Cervical Traction       Goals:  Pt. will be independent with home exercise program in : 4 weeks Met  Pt. will report decreased intensity, frequency of : Pain;in 4 weeks;Comment  Comment:: 50% Met with traction unit use  Patient will decrease : NDI score;by _ points;for improved quality of function;in 6 weeks  by ___ points: 10% Not Met  Pt will: be able to turn neck without increased pain and 10 degrees improved mobility with rotation in 6 weeks: Met after stretching and tration.      Patient was seen for 2 visits from 10/2/18 to 10/9/18 with - missed appointments.    The patient met goals and has demonstrated understanding of and independence in the home program for self-care, and progression to next steps.  He will initiate contact if questions or concerns arise.    Therapy will be discontinued at this time.  The patient will need a new referral to resume.    Thank you for your referral.  [place]  11/27/2018  1:52 PM

## 2021-06-21 NOTE — PROGRESS NOTES
[facility] Certification Request    October 17, 2018      Patient: Kelechi Kendrick  MR Number: [id number]  YOB: 1945  Date of Visit: 10/17/2018      Dear Dr. Jennifer Li:    Thank you for this referral.   We are seeing Kelechi Kendrick in Occupational Therapy for bilateral OA of the hands.    Medicare and/or Medicaid requires physician review and approval of the treatment plan. Please review the plan of care and verify that you agree with the therapy plan of care by co-signing this note.      Plan of Care  Authorization / Certification Start Date: 10/17/18  Authorization / Certification End Date: 01/15/19  Authorization / Certification Number of Visits: 12  Communication with: Referral Source  Patient Related Instruction: Nature of Condition;Treatment plan and rationale;Basis of treatment;Expected outcome  Times per Week: 1  Number of Weeks: 12  Number of Visits: 12  Therapeutic Exercise: Stretching  Functional Training (ADL's): ADL's;self care;compensatory training;ergonomics  Orthotic Fitting: custom    Goals:  Patient Will Demonstrate / Verbalize independence in self-management of condition in: 4 weeks  Patient will be able to: lift;carry;reach;for grooming/hygiene;for dressing;for grocery shopping;with less pain;with less difficulty;in 12 weeks  Patient will be able to  & pinch: for dressing;for hygiene;for grooming;with less difficulty;with less pain;in 12 weeks      If you have any questions or concerns, please don't hesitate to call.    Sincerely,      Edna Cesar, OT        Physician recommendation:     ___ Follow therapist's recommendation        ___ Modify therapy      *Physician co-signature indicates they certify the need for these services furnished within this plan and while under their care.      [facility]   Upper Extremity Initial Evaluation    Patient Name: Kelechi Kendrick  Date of evaluation: 10/17/2018  Referral  "Diagnosis: OA both hands  Referring provider: Brook Reddy*  Visit Diagnosis:     ICD-10-CM    1. Pain in both hands M79.641     M79.642    2. Weakness of both hands R29.898    3. Decreased activities of daily living (ADL) R68.89        Assessment:      Patient has tightness with puckering at the base of both 4th finger volar MCP's. Patient was told many years ago that this was a \"Hebrew descent disease of the finger\". It is consistent with Dupuytren's contracture. Patient declined a splint and splinting has shown to have little or no benefit to Dupuytren's contractures. He was instructed that aggressive stretching is contraindicated and could make it worse. He was instructed to continue to perform gentle AROM. He will also perform coordination exercises to counter the effect the contracture is having on his overall hand function.    Goals:  Patient Will Demonstrate / Verbalize independence in self-management of condition in: 4 weeks  Patient will be able to: lift;carry;reach;for grooming/hygiene;for dressing;for grocery shopping;with less pain;with less difficulty;in 12 weeks  Patient will be able to  & pinch: for dressing;for hygiene;for grooming;with less difficulty;with less pain;in 12 weeks    Patient's expectations/goals are realistic.    Barriers to Learning or Achieving Goals:  No Barriers.       Plan / Patient Instructions:        Plan of Care:   Authorization / Certification Start Date: 10/17/18  Authorization / Certification End Date: 01/15/19  Authorization / Certification Number of Visits: 12  Communication with: Referral Source  Patient Related Instruction: Nature of Condition;Treatment plan and rationale;Basis of treatment;Expected outcome  Times per Week: 1  Number of Weeks: 12  Number of Visits: 12  Therapeutic Exercise: Stretching  Functional Training (ADL's): ADL's;self care;compensatory training;ergonomics  Orthotic Fitting: custom    Plan for next visit: no need for ongoing OT   "   Subjective:        Social information:   Occupation:retired   Work Status:NA     History of Present Illness:    Kelechi is a 73 y.o. male who presents to therapy today with complaints of tightness in bilateral 4th fingers that is consistent with Dupuytren's contracture. Date of onset/duration of symptoms is many years ago with worsening coordination in September 2018. Onset was gradual. Symptoms are not improving. He describes their previous level of function as limited with the same symptoms. Functional limitations are described as occurring with fine motor activities for ADL's and IADL's.    Pain rating at rest: 0  Pain rating with activity: 6       Objective:      Note: Items left blank indicates the item was not performed or not indicated at the time of the evaluation.    Patient Outcome Measures :    QuickDASH Score: 47.7    Upper Extremity Examination:  1. Pain in both hands     2. Weakness of both hands     3. Decreased activities of daily living (ADL)       Precautions/Restrictions: None  Involved side: Bilateral  Atrophy:  Absent  Color: Normal  Temperature: Normal  Edema: Absent  Palpation: No tenderness.   Scar: NA  Guarded extremity: Normal.  Sensory: Patient reports normal.      Coordination  Right   Left     NT WNL Impaired NT WNL Impaired   Gross Motor  X   X    Fine Motor   X   X   9 hole peg X   X       Hand AROM  Right   Left     NT WNL Impaired NT WNL Impaired   Full Fist  X   X    Flat Fist   X   X   Claw Fist   X   X     ROM/STRENGTH RIGHT LEFT   Note: * indicates pain AROM AROM   Shoulder Flexion - 180? WNL WNL   Shoulder Ext - 60?  WNL WNL   Shoulder Abd - 180? WNL WNL   Elbow Flexion - 150? WNL WNL   Elbow Extension - 0?    WNL WNL   Forearm Supination - 80? WNL WNL   Forearm Pronation - 80? WNL WNL   Wrist Flexion - 65-80?  WNL WNL   Wrist Extension - 65-80? WNL WNL   Ulnar Deviation - 20-35?     Radial Deviation - 10-20?      Strength 80# 84#   3 Point Pinch 11# 12#   Lateral Pinch        May benefit from PT evaluation: No    U/E orthosis currently:   No    Treatment Today: Patient instructed on fine motor activities including manipulating super ball between fingers, shuffle cards, buttoning etc.  TREATMENT MINUTES COMMENTS   Evaluation 25    Self-care/ Home management     Manual therapy     Neuromuscular Re-education     Orthotic fitting     Therapeutic Exercises 15    Iontophoresis           Total 40    Blank areas are intentional and mean the treatment did not include these items.     GOALS AND PLAN OF CARE WERE ESTABLISHED IN COOPERATION WITH THE PATIENT    OT Evaluation Code: (Please list factors)   Comorbidities: HTN, HLD, T2DM  Profile/History Review: Brief    Need for eval modification: No    # Treatment options: Limited    Clinical Decision Making:  Low      Occupational Profile/ Medical and Therapy History and Comorbidities Occupational Performance Clinical Decision Making   (Complexity)   brief history with review of medical/therapy records related to the presenting problem.  No comorbidities 1-3 Performance deficits that result in activity limitations and/or participation restrictions.    No Assessment Modification  Low complexity, which includes  problem-focused assessments, and consideration of a limited number of treatment options.      expanded review of medical/therapy records and additional review of physical, cognitive and psychosocial history.    May have comorbidities 3-5 Performance deficits that result in activity limitations and/or participation restrictions.    Minimal to moderate modification of assessment Moderate complexity, which includes analysis of data from detailed assessments, and consideration of several treatment options.         Review of medical/therapy records and extensive additional review of physical, cognitive and psychosocial history.  Comorbidities affect occupational performance 5 or more Performance deficits that result in activity limitations and/or  participation restrictions.    Significant modification of assessment High complexity, analysis of  Occupational profile and data,  Comprehensive assessments, multiple treatment options.            Edna Cesar  10/17/2018  11:04 AM

## 2021-12-10 ENCOUNTER — LAB REQUISITION (OUTPATIENT)
Dept: LAB | Facility: CLINIC | Age: 76
End: 2021-12-10
Payer: MEDICARE

## 2021-12-10 DIAGNOSIS — I10 ESSENTIAL (PRIMARY) HYPERTENSION: ICD-10-CM

## 2021-12-10 DIAGNOSIS — E78.2 MIXED HYPERLIPIDEMIA: ICD-10-CM

## 2021-12-10 LAB
ALBUMIN SERPL-MCNC: 3.8 G/DL (ref 3.5–5)
ALP SERPL-CCNC: 75 U/L (ref 45–120)
ALT SERPL W P-5'-P-CCNC: 19 U/L (ref 0–45)
ANION GAP SERPL CALCULATED.3IONS-SCNC: 9 MMOL/L (ref 5–18)
AST SERPL W P-5'-P-CCNC: 17 U/L (ref 0–40)
BILIRUB SERPL-MCNC: 0.4 MG/DL (ref 0–1)
BUN SERPL-MCNC: 10 MG/DL (ref 8–28)
CALCIUM SERPL-MCNC: 9.1 MG/DL (ref 8.5–10.5)
CHLORIDE BLD-SCNC: 102 MMOL/L (ref 98–107)
CHOLEST SERPL-MCNC: 116 MG/DL
CO2 SERPL-SCNC: 28 MMOL/L (ref 22–31)
CREAT SERPL-MCNC: 0.78 MG/DL (ref 0.7–1.3)
GFR SERPL CREATININE-BSD FRML MDRD: 87 ML/MIN/1.73M2
GLUCOSE BLD-MCNC: 172 MG/DL (ref 70–125)
HDLC SERPL-MCNC: 35 MG/DL
LDLC SERPL CALC-MCNC: 63 MG/DL
POTASSIUM BLD-SCNC: 4.5 MMOL/L (ref 3.5–5)
PROT SERPL-MCNC: 6.8 G/DL (ref 6–8)
SODIUM SERPL-SCNC: 139 MMOL/L (ref 136–145)
TRIGL SERPL-MCNC: 88 MG/DL

## 2021-12-10 PROCEDURE — 80061 LIPID PANEL: CPT | Mod: ORL | Performed by: FAMILY MEDICINE

## 2021-12-10 PROCEDURE — 80053 COMPREHEN METABOLIC PANEL: CPT | Mod: ORL | Performed by: FAMILY MEDICINE

## 2022-02-14 DIAGNOSIS — J44.9 COPD, GROUP A, BY GOLD 2017 CLASSIFICATION (H): ICD-10-CM

## 2022-02-21 ENCOUNTER — OFFICE VISIT (OUTPATIENT)
Dept: PULMONOLOGY | Facility: OTHER | Age: 77
End: 2022-02-21
Payer: MEDICARE

## 2022-02-21 VITALS
OXYGEN SATURATION: 96 % | BODY MASS INDEX: 32.4 KG/M2 | HEART RATE: 68 BPM | RESPIRATION RATE: 12 BRPM | SYSTOLIC BLOOD PRESSURE: 130 MMHG | DIASTOLIC BLOOD PRESSURE: 72 MMHG | WEIGHT: 194.7 LBS

## 2022-02-21 DIAGNOSIS — J44.9 COPD, GROUP A, BY GOLD 2017 CLASSIFICATION (H): ICD-10-CM

## 2022-02-21 PROCEDURE — 99213 OFFICE O/P EST LOW 20 MIN: CPT | Performed by: INTERNAL MEDICINE

## 2022-02-21 NOTE — PROGRESS NOTES
Pulmonary Clinic Follow-up Visit    Assessment & Plan:  Kelechi is a 76 y M with an 80+ pack year tobacco history, history of diabetes, HTN, HLD, GOLD A COPD/emphysema, with moderate-severe obstruction who presents for follow-up.    Doing well, baseline exertional shortness of breath with heavy activity, no exacerbations in >1 year.       Recommendations:    Continues to do well on anoro, no albuterol use. Meds all refilled today.     Mild exertional issues, minimal symptom burden, minimal exacerbations    Quit smoking > 15 years ago, does not qualify for screening scans    Up to date on vaccinations including COVID series     Provided action plan previously      I will see him back in 1 year, he will call me if any new issues arise.      Gwendolyn Escobar MD  Pulmonary and Critical Care Medicine  [facility]  Office: [phone]    ----------------------------    CC: COPD follow-up    Interval HPI:   Wife passed over the winter    Not requiring albuterol, using anoro daily  No recent exacerbations, or ER visits for breathing, has had a good year  No new medical updates  Exertional dyspnea stable/unchanged    ----------------------  73 y M with DM, HTN, HLD, COPD/emphysema seen by myself inpatient for CAP and an AECOPD.    Flu and sputum culture were negative.     Feeling back to baseline after hospital stay. On Anoro, using daily and albuterol not needed. He was discharged on 1 LPM oxygen with exertion, that he has started using only at night.     CT scan shows interval resolution of lung infiltrates.    CAT 8 today.    ROS:  A 12-system review was obtained and was negative with the exception of the symptoms endorsed in the history of present illness.    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  Denies alcohol or drug abuse  Lives  in a town home with his wife  Has 1 dog  No recent travel    Physical Exam:  /72 (BP Location: Left arm, Patient Position: Chair, Cuff Size: Adult Regular)   Pulse 68   Resp 12   Wt 88.3 kg (194 lb 11.2 oz)   SpO2 96%   BMI 32.40 kg/m    Gen: Alert, oriented, no distress, hoarse voice  HEENT: nasal turbinates are unremarkable, no oropharyngeal lesions, no cervical or supraclavicular lymphadenopathy  CV: RRR, no M/G/R  Resp: Diminished but clear, no focal crackles or wheezes  Abd: obese, soft, nontender, no palpable organomegaly  Skin: no apparent rashes  Ext: no cyanosis, clubbing or edema  Neuro: alert, nonfocal    Labs:  Reviewed    Imaging studies:  Personally reviewed:    No new imaging    7/8/19 CT Chest:  LUNGS AND PLEURA: Mild apical emphysema. Previously seen left upper lobe infiltrates have resolved. Scattered diminutive pulmonary nodules are unchanged.     MEDIASTINUM: Small lymph nodes are unchanged and not enlarged by size criteria. Coronary artery calcification.     LIMITED UPPER ABDOMEN: Negative.     MUSCULOSKELETAL: Negative.     CONCLUSION:   1.  Previously seen left upper lobe infiltrates have resolved.      7/9/19 PFT's  FEV1/FVC 61 FEV1 52% FVC 64%  Neg bronchodilator response  TLC 89%  % RV/%  DLCO bjorn 71%    Moderate-severe obstruction   Neg BD response  Air trapping  Mild reduction in diffusion

## 2022-02-21 NOTE — LETTER
2/21/2022         RE: Kelechi Kendrick  [address] [facility] [address]        Dear Colleague,    Thank you for referring your patient, Kelechi Kendrick, to the [facility]. Please see a copy of my visit note below.    Pulmonary Clinic Follow-up Visit    Assessment & Plan:  Kelechi is a 76 y M with an 80+ pack year tobacco history, history of diabetes, HTN, HLD, GOLD A COPD/emphysema, with moderate-severe obstruction who presents for follow-up.    Doing well, baseline exertional shortness of breath with heavy activity, no exacerbations in >1 year.       Recommendations:    Continues to do well on anoro, no albuterol use. Meds all refilled today.     Mild exertional issues, minimal symptom burden, minimal exacerbations    Quit smoking > 15 years ago, does not qualify for screening scans    Up to date on vaccinations including COVID series     Provided action plan previously      I will see him back in 1 year, he will call me if any new issues arise.      Gwendolyn Escobar MD  Pulmonary and Critical Care Medicine  [facility]  Office: [phone]    ----------------------------    CC: COPD follow-up    Interval HPI:   Wife passed over the winter    Not requiring albuterol, using anoro daily  No recent exacerbations, or ER visits for breathing, has had a good year  No new medical updates  Exertional dyspnea stable/unchanged    ----------------------  73 y M with DM, HTN, HLD, COPD/emphysema seen by myself inpatient for CAP and an AECOPD.    Flu and sputum culture were negative.     Feeling back to baseline after hospital stay. On Anoro, using daily and albuterol not needed. He was discharged on 1 LPM oxygen with exertion, that he has started using only at night.     CT scan shows interval resolution of lung infiltrates.    CAT 8 today.    ROS:  A 12-system review was obtained and was negative with the exception of the symptoms endorsed in the history of present  illness.    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  Denies alcohol or drug abuse  Lives in a town home with his wife  Has 1 dog  No recent travel    Physical Exam:  /72 (BP Location: Left arm, Patient Position: Chair, Cuff Size: Adult Regular)   Pulse 68   Resp 12   Wt 88.3 kg (194 lb 11.2 oz)   SpO2 96%   BMI 32.40 kg/m    Gen: Alert, oriented, no distress, hoarse voice  HEENT: nasal turbinates are unremarkable, no oropharyngeal lesions, no cervical or supraclavicular lymphadenopathy  CV: RRR, no M/G/R  Resp: Diminished but clear, no focal crackles or wheezes  Abd: obese, soft, nontender, no palpable organomegaly  Skin: no apparent rashes  Ext: no cyanosis, clubbing or edema  Neuro: alert, nonfocal    Labs:  Reviewed    Imaging studies:  Personally reviewed:    No new imaging    7/8/19 CT Chest:  LUNGS AND PLEURA: Mild apical emphysema. Previously seen left upper lobe infiltrates have resolved. Scattered diminutive pulmonary nodules are unchanged.     MEDIASTINUM: Small lymph nodes are unchanged and not enlarged by size criteria. Coronary artery calcification.     LIMITED UPPER ABDOMEN: Negative.     MUSCULOSKELETAL: Negative.     CONCLUSION:   1.  Previously seen left upper lobe infiltrates have resolved.      7/9/19 PFT's  FEV1/FVC 61 FEV1 52% FVC 64%  Neg bronchodilator response  TLC 89%  % RV/%  DLCO bjorn 71%    Moderate-severe obstruction   Neg BD response  Air trapping  Mild reduction in diffusion            Again, thank you for allowing me to participate in the care of your patient.        Sincerely,        Gwendolyn Escobar MD

## 2022-02-21 NOTE — PATIENT INSTRUCTIONS
It was a pleasure to see you today.    I have refilled your anoro today. Continue daily.   It sounds like your lungs are doing very well.       We can follow up in 1 year. Contact me before that if any new issues.       Gwendolyn Escobar MD  Pulmonary and Critical Care Medicine  [facility]  Office: [phone]

## 2022-12-09 ENCOUNTER — LAB REQUISITION (OUTPATIENT)
Dept: LAB | Facility: CLINIC | Age: 77
End: 2022-12-09
Payer: MEDICARE

## 2022-12-09 DIAGNOSIS — E78.2 MIXED HYPERLIPIDEMIA: ICD-10-CM

## 2022-12-09 DIAGNOSIS — I10 ESSENTIAL (PRIMARY) HYPERTENSION: ICD-10-CM

## 2022-12-09 PROCEDURE — 80053 COMPREHEN METABOLIC PANEL: CPT | Mod: ORL | Performed by: FAMILY MEDICINE

## 2022-12-09 PROCEDURE — 80061 LIPID PANEL: CPT | Mod: ORL | Performed by: FAMILY MEDICINE

## 2022-12-10 LAB
ALBUMIN SERPL BCG-MCNC: 4.5 G/DL (ref 3.5–5.2)
ALP SERPL-CCNC: 84 U/L (ref 40–129)
ALT SERPL W P-5'-P-CCNC: 36 U/L (ref 10–50)
ANION GAP SERPL CALCULATED.3IONS-SCNC: 14 MMOL/L (ref 7–15)
AST SERPL W P-5'-P-CCNC: 30 U/L (ref 10–50)
BILIRUB SERPL-MCNC: 0.4 MG/DL
BUN SERPL-MCNC: 15.1 MG/DL (ref 8–23)
CALCIUM SERPL-MCNC: 9 MG/DL (ref 8.8–10.2)
CHLORIDE SERPL-SCNC: 100 MMOL/L (ref 98–107)
CHOLEST SERPL-MCNC: 141 MG/DL
CREAT SERPL-MCNC: 0.82 MG/DL (ref 0.67–1.17)
DEPRECATED HCO3 PLAS-SCNC: 25 MMOL/L (ref 22–29)
GFR SERPL CREATININE-BSD FRML MDRD: 90 ML/MIN/1.73M2
GLUCOSE SERPL-MCNC: 158 MG/DL (ref 70–99)
HDLC SERPL-MCNC: 43 MG/DL
LDLC SERPL CALC-MCNC: 76 MG/DL
NONHDLC SERPL-MCNC: 98 MG/DL
POTASSIUM SERPL-SCNC: 4.9 MMOL/L (ref 3.4–5.3)
PROT SERPL-MCNC: 7.1 G/DL (ref 6.4–8.3)
SODIUM SERPL-SCNC: 139 MMOL/L (ref 136–145)
TRIGL SERPL-MCNC: 111 MG/DL

## 2023-02-21 ENCOUNTER — OFFICE VISIT (OUTPATIENT)
Dept: PULMONOLOGY | Facility: CLINIC | Age: 78
End: 2023-02-21
Payer: MEDICARE

## 2023-02-21 VITALS
DIASTOLIC BLOOD PRESSURE: 78 MMHG | BODY MASS INDEX: 31.35 KG/M2 | OXYGEN SATURATION: 95 % | SYSTOLIC BLOOD PRESSURE: 134 MMHG | WEIGHT: 188.4 LBS | HEART RATE: 64 BPM

## 2023-02-21 DIAGNOSIS — J44.9 CHRONIC OBSTRUCTIVE PULMONARY DISEASE, UNSPECIFIED COPD TYPE (H): ICD-10-CM

## 2023-02-21 DIAGNOSIS — J44.9 COPD, GROUP A, BY GOLD 2017 CLASSIFICATION (H): ICD-10-CM

## 2023-02-21 PROCEDURE — 99213 OFFICE O/P EST LOW 20 MIN: CPT | Performed by: INTERNAL MEDICINE

## 2023-02-21 NOTE — PROGRESS NOTES
Pulmonary Clinic Follow-up Visit    Assessment:  76yoM with 80 pack year smoking history, GOLD A COPD here for follow up.         Plan:  Not eligible for screening--quit >15 years ago  Continue Anoro  Action plan in place  Immunization up to date  Skin abscess--referred back to Dr. Pesron's office for drainage before it enlarges further.       Swati Bravo MD  Pulmonary/Critical Care    ----------------------------    CCx: COPD    HPI: 77yoM with COPD and former smoker who was initially established after hospitalization for NURY pneumonia previously patient of Dr. Escobar here for follow up. Last seen 2/2023.    Weaned off of oxygen.     Short of breath when shoveling 2nd half of the driveway. Can walk to the mailbox ok. No wheezing or coughing.     Current Regimen: Anoro  CAT: 0+0+0+2+0+0+2+3=7    ROS:  12-point review performed and notable for  Shortness of breath and leg swelling. The remainder reviewed and negative.       Current Meds:  Current Outpatient Medications   Medication Sig Dispense Refill     albuterol (PROAIR HFA;PROVENTIL HFA;VENTOLIN HFA) 90 mcg/actuation inhaler [ALBUTEROL (PROAIR HFA;PROVENTIL HFA;VENTOLIN HFA) 90 MCG/ACTUATION INHALER] Inhale 2 puffs every 4 (four) hours as needed for wheezing or shortness of breath. (Patient not taking: Reported on 2/21/2022) 1 Inhaler 6     ANORO ELLIPTA 62.5-25 MCG/INH oral inhaler Inhale 1 puff into the lungs daily 1 each 11     aspirin 81 MG EC tablet [ASPIRIN 81 MG EC TABLET] Take 81 mg by mouth daily.       glipiZIDE (GLUCOTROL) 10 MG tablet [GLIPIZIDE (GLUCOTROL) 10 MG TABLET] Take 10 mg by mouth 2 (two) times a day before meals.       insulin glargine (BASAGLAR KWIKPEN U-100 INSULIN) 100 unit/mL (3 mL) pen [INSULIN GLARGINE (BASAGLAR KWIKPEN U-100 INSULIN) 100 UNIT/ML (3 ML) PEN] Inject 44 Units under the skin at bedtime.       lisinopril (PRINIVIL,ZESTRIL) 10 MG tablet [LISINOPRIL (PRINIVIL,ZESTRIL) 10 MG TABLET] Take 10 mg by mouth daily.       metFORMIN  (GLUCOPHAGE) 500 MG tablet [METFORMIN (GLUCOPHAGE) 500 MG TABLET] Take 1,000 mg by mouth 2 (two) times a day with meals.       primidone (MYSOLINE) 50 MG tablet [PRIMIDONE (MYSOLINE) 50 MG TABLET] Take 50 mg by mouth at bedtime.       simvastatin (ZOCOR) 40 MG tablet [SIMVASTATIN (ZOCOR) 40 MG TABLET] Take 40 mg by mouth at bedtime.         Physical Exam:  /78 (BP Location: Left arm)   Pulse 64   Wt 85.5 kg (188 lb 6.4 oz)   SpO2 95%   BMI 31.35 kg/m    Gen: alert, oriented, no distress  HEENT: no lymphadenopathy  Neck: Trachea midline, No Accessory muscle use  CV: RRR, no M/G/R  Resp: CTAB, no focal crackles or wheezes  Abd: soft, nontender, no palpable organomegaly  Skin: no apparent rashes  Ext: no cyanosis, clubbing. Pitting edema  Neuro: alert, nonfocal    Labs:  CBC RESULTS:   Recent Labs   Lab Test 07/09/19  1428 05/17/19  0710   WBC  --  8.2   RBC  --  4.84   HGB 15.5 14.0   HCT  --  42.9   MCV  --  89   MCH  --  28.9   MCHC  --  32.6   RDW  --  12.5   PLT  --  194     Sodium   Date Value Ref Range Status   12/09/2022 139 136 - 145 mmol/L Final   05/23/2019 140 133 - 144 mmol/L Final     Potassium   Date Value Ref Range Status   12/09/2022 4.9 3.4 - 5.3 mmol/L Final   12/10/2021 4.5 3.5 - 5.0 mmol/L Final   05/23/2019 4.3 3.4 - 5.3 mmol/L Final     Chloride   Date Value Ref Range Status   12/09/2022 100 98 - 107 mmol/L Final   12/10/2021 102 98 - 107 mmol/L Final   05/23/2019 107 94 - 109 mmol/L Final     Carbon Dioxide   Date Value Ref Range Status   05/23/2019 28 20 - 32 mmol/L Final     Carbon Dioxide (CO2)   Date Value Ref Range Status   12/09/2022 25 22 - 29 mmol/L Final   12/10/2021 28 22 - 31 mmol/L Final     Anion Gap   Date Value Ref Range Status   12/09/2022 14 7 - 15 mmol/L Final   12/10/2021 9 5 - 18 mmol/L Final   05/23/2019 5 3 - 14 mmol/L Final     Glucose   Date Value Ref Range Status   12/09/2022 158 (H) 70 - 99 mg/dL Final   12/10/2021 172 (H) 70 - 125 mg/dL Final   05/23/2019 103  (H) 70 - 99 mg/dL Final     Urea Nitrogen   Date Value Ref Range Status   12/09/2022 15.1 8.0 - 23.0 mg/dL Final   12/10/2021 10 8 - 28 mg/dL Final   05/23/2019 22 7 - 30 mg/dL Final     Creatinine   Date Value Ref Range Status   12/09/2022 0.82 0.67 - 1.17 mg/dL Final   05/23/2019 0.80 0.66 - 1.25 mg/dL Final     GFR Estimate   Date Value Ref Range Status   12/09/2022 90 >60 mL/min/1.73m2 Final     Comment:     Effective December 21, 2021 eGFRcr in adults is calculated using the 2021 CKD-EPI creatinine equation which includes age and gender (John et al., NEJ, DOI: 10.1056/EWUWgv6319680)   11/27/2020 >60 >60 mL/min/1.73m2 Final   05/23/2019 88 >60 mL/min/[1.73_m2] Final     Comment:     Non  GFR Calc  Starting 12/18/2018, serum creatinine based estimated GFR (eGFR) will be   calculated using the Chronic Kidney Disease Epidemiology Collaboration   (CKD-EPI) equation.       Calcium   Date Value Ref Range Status   12/09/2022 9.0 8.8 - 10.2 mg/dL Final   05/23/2019 8.6 8.5 - 10.1 mg/dL Final         Imaging studies:  Personally reviewed image/s and Personally reviewed impression/s  EXAM: CT CHEST WO CONTRAST  LOCATION: [facility]  DATE/TIME: 7/8/2019 10:43 AM     INDICATION: Follow-up NURY opacity.   COMPARISON: 05/14/2019.  TECHNIQUE: Helical images were obtained through the chest. Multiplanar reformats were obtained. Dose reduction techniques were used.  CONTRAST: None.     FINDINGS:   LUNGS AND PLEURA: Mild apical emphysema. Previously seen left upper lobe infiltrates have resolved. Scattered diminutive pulmonary nodules are unchanged.     MEDIASTINUM: Small lymph nodes are unchanged and not enlarged by size criteria. Coronary artery calcification.     LIMITED UPPER ABDOMEN: Negative.     MUSCULOSKELETAL: Negative.     IMPRESSION:  CONCLUSION:   1.  Previously seen left upper lobe infiltrates have resolved.          PFT's  FEV1/FVC is 61 and is reduced.  FEV1 is 1.34 L (52% predicted) and is  reduced.  FVC is 2.21 L (64% predicted) and reduced.  There was no improvement in spirometry after a single inhaled dose of bronchodilator.  TLC is 5.44 L (89% predicted) and is normal.  RV is 3.45 L (133% predicted) and is increased.  DLCO is 71 % predicted and is reduced when it was corrected for hemoglobin.  Impression: moderate obstruction without reversibility. Mildly reduced diffusion capacity

## 2023-07-02 ENCOUNTER — LAB REQUISITION (OUTPATIENT)
Dept: LAB | Facility: CLINIC | Age: 78
End: 2023-07-02
Payer: MEDICARE

## 2023-07-02 DIAGNOSIS — R10.32 LEFT LOWER QUADRANT PAIN: ICD-10-CM

## 2023-07-02 PROCEDURE — 83690 ASSAY OF LIPASE: CPT | Mod: ORL | Performed by: FAMILY MEDICINE

## 2023-07-02 PROCEDURE — 87086 URINE CULTURE/COLONY COUNT: CPT | Mod: ORL | Performed by: FAMILY MEDICINE

## 2023-07-02 PROCEDURE — 80053 COMPREHEN METABOLIC PANEL: CPT | Mod: ORL | Performed by: FAMILY MEDICINE

## 2023-07-03 LAB
ALBUMIN SERPL BCG-MCNC: 4.6 G/DL (ref 3.5–5.2)
ALP SERPL-CCNC: 76 U/L (ref 40–129)
ALT SERPL W P-5'-P-CCNC: 14 U/L (ref 0–70)
ANION GAP SERPL CALCULATED.3IONS-SCNC: 11 MMOL/L (ref 7–15)
AST SERPL W P-5'-P-CCNC: 14 U/L (ref 0–45)
BILIRUB SERPL-MCNC: 0.4 MG/DL
BUN SERPL-MCNC: 16 MG/DL (ref 8–23)
CALCIUM SERPL-MCNC: 9.7 MG/DL (ref 8.8–10.2)
CHLORIDE SERPL-SCNC: 99 MMOL/L (ref 98–107)
CREAT SERPL-MCNC: 0.75 MG/DL (ref 0.67–1.17)
DEPRECATED HCO3 PLAS-SCNC: 28 MMOL/L (ref 22–29)
GFR SERPL CREATININE-BSD FRML MDRD: >90 ML/MIN/1.73M2
GLUCOSE SERPL-MCNC: 152 MG/DL (ref 70–99)
LIPASE SERPL-CCNC: 45 U/L (ref 13–60)
POTASSIUM SERPL-SCNC: 5.7 MMOL/L (ref 3.4–5.3)
PROT SERPL-MCNC: 7.6 G/DL (ref 6.4–8.3)
SODIUM SERPL-SCNC: 138 MMOL/L (ref 136–145)

## 2023-07-05 LAB — BACTERIA UR CULT: NORMAL

## 2023-08-24 ENCOUNTER — LAB REQUISITION (OUTPATIENT)
Dept: LAB | Facility: CLINIC | Age: 78
End: 2023-08-24
Payer: MEDICARE

## 2023-08-24 DIAGNOSIS — I10 ESSENTIAL (PRIMARY) HYPERTENSION: ICD-10-CM

## 2023-08-24 DIAGNOSIS — E78.2 MIXED HYPERLIPIDEMIA: ICD-10-CM

## 2023-08-24 LAB
ALBUMIN SERPL BCG-MCNC: 3.9 G/DL (ref 3.5–5.2)
ALP SERPL-CCNC: 80 U/L (ref 40–129)
ALT SERPL W P-5'-P-CCNC: 17 U/L (ref 0–70)
ANION GAP SERPL CALCULATED.3IONS-SCNC: 14 MMOL/L (ref 7–15)
AST SERPL W P-5'-P-CCNC: ABNORMAL U/L
BILIRUB SERPL-MCNC: 0.3 MG/DL
BUN SERPL-MCNC: 15.6 MG/DL (ref 8–23)
CALCIUM SERPL-MCNC: 9.3 MG/DL (ref 8.8–10.2)
CHLORIDE SERPL-SCNC: 98 MMOL/L (ref 98–107)
CHOLEST SERPL-MCNC: 100 MG/DL
CREAT SERPL-MCNC: 0.74 MG/DL (ref 0.67–1.17)
DEPRECATED HCO3 PLAS-SCNC: 25 MMOL/L (ref 22–29)
GFR SERPL CREATININE-BSD FRML MDRD: >90 ML/MIN/1.73M2
GLUCOSE SERPL-MCNC: 83 MG/DL (ref 70–99)
HDLC SERPL-MCNC: 36 MG/DL
LDLC SERPL CALC-MCNC: 37 MG/DL
NONHDLC SERPL-MCNC: 64 MG/DL
POTASSIUM SERPL-SCNC: 5.7 MMOL/L (ref 3.4–5.3)
PROT SERPL-MCNC: 7.2 G/DL (ref 6.4–8.3)
SODIUM SERPL-SCNC: 137 MMOL/L (ref 136–145)
TRIGL SERPL-MCNC: 134 MG/DL

## 2023-08-24 PROCEDURE — 80061 LIPID PANEL: CPT | Mod: ORL | Performed by: PHYSICIAN ASSISTANT

## 2023-08-24 PROCEDURE — 84155 ASSAY OF PROTEIN SERUM: CPT | Mod: ORL | Performed by: PHYSICIAN ASSISTANT

## 2023-09-27 ENCOUNTER — LAB REQUISITION (OUTPATIENT)
Dept: LAB | Facility: CLINIC | Age: 78
End: 2023-09-27
Payer: MEDICARE

## 2023-09-27 DIAGNOSIS — E11.9 TYPE 2 DIABETES MELLITUS WITHOUT COMPLICATIONS (H): ICD-10-CM

## 2023-09-27 LAB
ANION GAP SERPL CALCULATED.3IONS-SCNC: 13 MMOL/L (ref 7–15)
BUN SERPL-MCNC: 10.3 MG/DL (ref 8–23)
CALCIUM SERPL-MCNC: 9.2 MG/DL (ref 8.8–10.2)
CHLORIDE SERPL-SCNC: 96 MMOL/L (ref 98–107)
CREAT SERPL-MCNC: 0.66 MG/DL (ref 0.67–1.17)
DEPRECATED HCO3 PLAS-SCNC: 26 MMOL/L (ref 22–29)
EGFRCR SERPLBLD CKD-EPI 2021: >90 ML/MIN/1.73M2
GLUCOSE SERPL-MCNC: 285 MG/DL (ref 70–99)
POTASSIUM SERPL-SCNC: 4.8 MMOL/L (ref 3.4–5.3)
SODIUM SERPL-SCNC: 135 MMOL/L (ref 135–145)

## 2023-09-27 PROCEDURE — 80048 BASIC METABOLIC PNL TOTAL CA: CPT | Mod: ORL | Performed by: PHYSICIAN ASSISTANT

## 2023-10-09 RX ORDER — ALBUTEROL SULFATE 90 UG/1
2 AEROSOL, METERED RESPIRATORY (INHALATION) EVERY 4 HOURS PRN
Status: ON HOLD | COMMUNITY
End: 2024-03-11

## 2023-10-09 RX ORDER — PIOGLITAZONEHYDROCHLORIDE 15 MG/1
15 TABLET ORAL DAILY
COMMUNITY

## 2023-10-10 ENCOUNTER — HOSPITAL ENCOUNTER (OUTPATIENT)
Facility: HOSPITAL | Age: 78
Setting detail: OBSERVATION
Discharge: HOME | End: 2023-10-11
Attending: UROLOGY | Admitting: UROLOGY
Payer: MEDICARE

## 2023-10-10 ENCOUNTER — ANESTHESIA (OUTPATIENT)
Dept: SURGERY | Facility: HOSPITAL | Age: 78
End: 2023-10-10
Payer: MEDICARE

## 2023-10-10 ENCOUNTER — ANESTHESIA EVENT (OUTPATIENT)
Dept: SURGERY | Facility: HOSPITAL | Age: 78
End: 2023-10-10
Payer: MEDICARE

## 2023-10-10 DIAGNOSIS — C67.9 MALIGNANT NEOPLASM OF URINARY BLADDER, UNSPECIFIED SITE (H): Primary | ICD-10-CM

## 2023-10-10 LAB
GLUCOSE BLDC GLUCOMTR-MCNC: 139 MG/DL (ref 70–99)
GLUCOSE BLDC GLUCOMTR-MCNC: 157 MG/DL (ref 70–99)

## 2023-10-10 PROCEDURE — 999N000141 HC STATISTIC PRE-PROCEDURE NURSING ASSESSMENT: Performed by: UROLOGY

## 2023-10-10 PROCEDURE — 82962 GLUCOSE BLOOD TEST: CPT

## 2023-10-10 PROCEDURE — 250N000013 HC RX MED GY IP 250 OP 250 PS 637: Performed by: ANESTHESIOLOGY

## 2023-10-10 PROCEDURE — 710N000009 HC RECOVERY PHASE 1, LEVEL 1, PER MIN: Performed by: UROLOGY

## 2023-10-10 PROCEDURE — 360N000076 HC SURGERY LEVEL 3, PER MIN: Performed by: UROLOGY

## 2023-10-10 PROCEDURE — 250N000011 HC RX IP 250 OP 636: Mod: JZ | Performed by: NURSE ANESTHETIST, CERTIFIED REGISTERED

## 2023-10-10 PROCEDURE — 272N000001 HC OR GENERAL SUPPLY STERILE: Performed by: UROLOGY

## 2023-10-10 PROCEDURE — 370N000017 HC ANESTHESIA TECHNICAL FEE, PER MIN: Performed by: UROLOGY

## 2023-10-10 PROCEDURE — 250N000009 HC RX 250: Performed by: UROLOGY

## 2023-10-10 PROCEDURE — 96374 THER/PROPH/DIAG INJ IV PUSH: CPT

## 2023-10-10 PROCEDURE — 250N000025 HC SEVOFLURANE, PER MIN: Performed by: UROLOGY

## 2023-10-10 PROCEDURE — 258N000003 HC RX IP 258 OP 636: Performed by: NURSE ANESTHETIST, CERTIFIED REGISTERED

## 2023-10-10 PROCEDURE — 250N000009 HC RX 250: Performed by: NURSE ANESTHETIST, CERTIFIED REGISTERED

## 2023-10-10 PROCEDURE — 250N000011 HC RX IP 250 OP 636: Mod: JZ | Performed by: UROLOGY

## 2023-10-10 PROCEDURE — 88307 TISSUE EXAM BY PATHOLOGIST: CPT | Mod: TC | Performed by: UROLOGY

## 2023-10-10 PROCEDURE — 258N000003 HC RX IP 258 OP 636: Performed by: ANESTHESIOLOGY

## 2023-10-10 PROCEDURE — 250N000013 HC RX MED GY IP 250 OP 250 PS 637: Performed by: UROLOGY

## 2023-10-10 PROCEDURE — 120N000001 HC R&B MED SURG/OB

## 2023-10-10 PROCEDURE — 258N000001 HC RX 258: Performed by: UROLOGY

## 2023-10-10 PROCEDURE — 250N000011 HC RX IP 250 OP 636: Performed by: NURSE PRACTITIONER

## 2023-10-10 RX ORDER — ACETAMINOPHEN 325 MG/1
975 TABLET ORAL ONCE
Status: COMPLETED | OUTPATIENT
Start: 2023-10-10 | End: 2023-10-10

## 2023-10-10 RX ORDER — NALOXONE HYDROCHLORIDE 0.4 MG/ML
0.4 INJECTION, SOLUTION INTRAMUSCULAR; INTRAVENOUS; SUBCUTANEOUS
Status: DISCONTINUED | OUTPATIENT
Start: 2023-10-10 | End: 2023-10-11 | Stop reason: HOSPADM

## 2023-10-10 RX ORDER — HYDROMORPHONE HYDROCHLORIDE 1 MG/ML
0.4 INJECTION, SOLUTION INTRAMUSCULAR; INTRAVENOUS; SUBCUTANEOUS EVERY 5 MIN PRN
Status: DISCONTINUED | OUTPATIENT
Start: 2023-10-10 | End: 2023-10-10 | Stop reason: HOSPADM

## 2023-10-10 RX ORDER — SODIUM CHLORIDE, SODIUM LACTATE, POTASSIUM CHLORIDE, CALCIUM CHLORIDE 600; 310; 30; 20 MG/100ML; MG/100ML; MG/100ML; MG/100ML
INJECTION, SOLUTION INTRAVENOUS CONTINUOUS
Status: DISCONTINUED | OUTPATIENT
Start: 2023-10-10 | End: 2023-10-10 | Stop reason: HOSPADM

## 2023-10-10 RX ORDER — OXYCODONE HYDROCHLORIDE 5 MG/1
5 TABLET ORAL EVERY 4 HOURS PRN
Status: DISCONTINUED | OUTPATIENT
Start: 2023-10-10 | End: 2023-10-11 | Stop reason: HOSPADM

## 2023-10-10 RX ORDER — AMOXICILLIN 250 MG
1 CAPSULE ORAL 2 TIMES DAILY
Status: DISCONTINUED | OUTPATIENT
Start: 2023-10-10 | End: 2023-10-11 | Stop reason: HOSPADM

## 2023-10-10 RX ORDER — HYDROMORPHONE HCL IN WATER/PF 6 MG/30 ML
0.2 PATIENT CONTROLLED ANALGESIA SYRINGE INTRAVENOUS
Status: DISCONTINUED | OUTPATIENT
Start: 2023-10-10 | End: 2023-10-11 | Stop reason: HOSPADM

## 2023-10-10 RX ORDER — SIMVASTATIN 10 MG
40 TABLET ORAL EVERY EVENING
Status: DISCONTINUED | OUTPATIENT
Start: 2023-10-10 | End: 2023-10-11 | Stop reason: HOSPADM

## 2023-10-10 RX ORDER — PRIMIDONE 50 MG/1
50 TABLET ORAL AT BEDTIME
Status: DISCONTINUED | OUTPATIENT
Start: 2023-10-10 | End: 2023-10-11 | Stop reason: HOSPADM

## 2023-10-10 RX ORDER — DIPHENHYDRAMINE HYDROCHLORIDE 50 MG/ML
12.5 INJECTION INTRAMUSCULAR; INTRAVENOUS EVERY 6 HOURS PRN
Status: DISCONTINUED | OUTPATIENT
Start: 2023-10-10 | End: 2023-10-10 | Stop reason: HOSPADM

## 2023-10-10 RX ORDER — LIDOCAINE 40 MG/G
CREAM TOPICAL
Status: DISCONTINUED | OUTPATIENT
Start: 2023-10-10 | End: 2023-10-10 | Stop reason: HOSPADM

## 2023-10-10 RX ORDER — BISACODYL 10 MG
10 SUPPOSITORY, RECTAL RECTAL DAILY PRN
Status: DISCONTINUED | OUTPATIENT
Start: 2023-10-10 | End: 2023-10-11 | Stop reason: HOSPADM

## 2023-10-10 RX ORDER — PROPOFOL 10 MG/ML
INJECTION, EMULSION INTRAVENOUS PRN
Status: DISCONTINUED | OUTPATIENT
Start: 2023-10-10 | End: 2023-10-10

## 2023-10-10 RX ORDER — FENTANYL CITRATE 50 UG/ML
50 INJECTION, SOLUTION INTRAMUSCULAR; INTRAVENOUS EVERY 5 MIN PRN
Status: DISCONTINUED | OUTPATIENT
Start: 2023-10-10 | End: 2023-10-10 | Stop reason: HOSPADM

## 2023-10-10 RX ORDER — NALOXONE HYDROCHLORIDE 0.4 MG/ML
0.2 INJECTION, SOLUTION INTRAMUSCULAR; INTRAVENOUS; SUBCUTANEOUS
Status: DISCONTINUED | OUTPATIENT
Start: 2023-10-10 | End: 2023-10-11 | Stop reason: HOSPADM

## 2023-10-10 RX ORDER — LIDOCAINE 40 MG/G
CREAM TOPICAL
Status: DISCONTINUED | OUTPATIENT
Start: 2023-10-10 | End: 2023-10-11 | Stop reason: HOSPADM

## 2023-10-10 RX ORDER — CEFAZOLIN SODIUM/WATER 2 G/20 ML
2 SYRINGE (ML) INTRAVENOUS
Status: COMPLETED | OUTPATIENT
Start: 2023-10-10 | End: 2023-10-10

## 2023-10-10 RX ORDER — FENTANYL CITRATE 50 UG/ML
25 INJECTION, SOLUTION INTRAMUSCULAR; INTRAVENOUS EVERY 5 MIN PRN
Status: DISCONTINUED | OUTPATIENT
Start: 2023-10-10 | End: 2023-10-10 | Stop reason: HOSPADM

## 2023-10-10 RX ORDER — ONDANSETRON 4 MG/1
4 TABLET, ORALLY DISINTEGRATING ORAL EVERY 30 MIN PRN
Status: DISCONTINUED | OUTPATIENT
Start: 2023-10-10 | End: 2023-10-10 | Stop reason: HOSPADM

## 2023-10-10 RX ORDER — DIPHENHYDRAMINE HCL 12.5MG/5ML
12.5 LIQUID (ML) ORAL EVERY 6 HOURS PRN
Status: DISCONTINUED | OUTPATIENT
Start: 2023-10-10 | End: 2023-10-10 | Stop reason: HOSPADM

## 2023-10-10 RX ORDER — ONDANSETRON 2 MG/ML
4 INJECTION INTRAMUSCULAR; INTRAVENOUS EVERY 30 MIN PRN
Status: DISCONTINUED | OUTPATIENT
Start: 2023-10-10 | End: 2023-10-11 | Stop reason: HOSPADM

## 2023-10-10 RX ORDER — SODIUM CHLORIDE, SODIUM LACTATE, POTASSIUM CHLORIDE, CALCIUM CHLORIDE 600; 310; 30; 20 MG/100ML; MG/100ML; MG/100ML; MG/100ML
INJECTION, SOLUTION INTRAVENOUS CONTINUOUS PRN
Status: DISCONTINUED | OUTPATIENT
Start: 2023-10-10 | End: 2023-10-10

## 2023-10-10 RX ORDER — ALBUTEROL SULFATE 90 UG/1
2 AEROSOL, METERED RESPIRATORY (INHALATION) EVERY 4 HOURS PRN
Status: DISCONTINUED | OUTPATIENT
Start: 2023-10-10 | End: 2023-10-11 | Stop reason: HOSPADM

## 2023-10-10 RX ORDER — HALOPERIDOL 5 MG/ML
1 INJECTION INTRAMUSCULAR
Status: DISCONTINUED | OUTPATIENT
Start: 2023-10-10 | End: 2023-10-10 | Stop reason: HOSPADM

## 2023-10-10 RX ORDER — PROCHLORPERAZINE MALEATE 5 MG
5 TABLET ORAL EVERY 6 HOURS PRN
Status: DISCONTINUED | OUTPATIENT
Start: 2023-10-10 | End: 2023-10-11 | Stop reason: HOSPADM

## 2023-10-10 RX ORDER — HYDROMORPHONE HCL IN WATER/PF 6 MG/30 ML
0.4 PATIENT CONTROLLED ANALGESIA SYRINGE INTRAVENOUS
Status: DISCONTINUED | OUTPATIENT
Start: 2023-10-10 | End: 2023-10-11 | Stop reason: HOSPADM

## 2023-10-10 RX ORDER — ONDANSETRON 2 MG/ML
4 INJECTION INTRAMUSCULAR; INTRAVENOUS EVERY 30 MIN PRN
Status: DISCONTINUED | OUTPATIENT
Start: 2023-10-10 | End: 2023-10-10 | Stop reason: HOSPADM

## 2023-10-10 RX ORDER — OXYCODONE HYDROCHLORIDE 5 MG/1
10 TABLET ORAL
Status: DISCONTINUED | OUTPATIENT
Start: 2023-10-10 | End: 2023-10-11 | Stop reason: HOSPADM

## 2023-10-10 RX ORDER — ONDANSETRON 2 MG/ML
INJECTION INTRAMUSCULAR; INTRAVENOUS PRN
Status: DISCONTINUED | OUTPATIENT
Start: 2023-10-10 | End: 2023-10-10

## 2023-10-10 RX ORDER — LIDOCAINE HYDROCHLORIDE 10 MG/ML
INJECTION, SOLUTION INFILTRATION; PERINEURAL PRN
Status: DISCONTINUED | OUTPATIENT
Start: 2023-10-10 | End: 2023-10-10

## 2023-10-10 RX ORDER — LABETALOL HYDROCHLORIDE 5 MG/ML
10 INJECTION, SOLUTION INTRAVENOUS
Status: DISCONTINUED | OUTPATIENT
Start: 2023-10-10 | End: 2023-10-10 | Stop reason: HOSPADM

## 2023-10-10 RX ORDER — ACETAMINOPHEN 325 MG/1
975 TABLET ORAL EVERY 8 HOURS
Status: DISCONTINUED | OUTPATIENT
Start: 2023-10-10 | End: 2023-10-11 | Stop reason: HOSPADM

## 2023-10-10 RX ORDER — LANOLIN ALCOHOL/MO/W.PET/CERES
1000 CREAM (GRAM) TOPICAL DAILY
COMMUNITY

## 2023-10-10 RX ORDER — CEFAZOLIN SODIUM/WATER 2 G/20 ML
2 SYRINGE (ML) INTRAVENOUS SEE ADMIN INSTRUCTIONS
Status: DISCONTINUED | OUTPATIENT
Start: 2023-10-10 | End: 2023-10-10 | Stop reason: HOSPADM

## 2023-10-10 RX ORDER — HYDROMORPHONE HYDROCHLORIDE 1 MG/ML
0.2 INJECTION, SOLUTION INTRAMUSCULAR; INTRAVENOUS; SUBCUTANEOUS EVERY 5 MIN PRN
Status: DISCONTINUED | OUTPATIENT
Start: 2023-10-10 | End: 2023-10-10 | Stop reason: HOSPADM

## 2023-10-10 RX ORDER — LISINOPRIL 5 MG/1
10 TABLET ORAL DAILY
Status: DISCONTINUED | OUTPATIENT
Start: 2023-10-10 | End: 2023-10-11 | Stop reason: HOSPADM

## 2023-10-10 RX ORDER — ONDANSETRON 2 MG/ML
4 INJECTION INTRAMUSCULAR; INTRAVENOUS EVERY 6 HOURS PRN
Status: DISCONTINUED | OUTPATIENT
Start: 2023-10-10 | End: 2023-10-11 | Stop reason: HOSPADM

## 2023-10-10 RX ORDER — POLYETHYLENE GLYCOL 3350 17 G/17G
17 POWDER, FOR SOLUTION ORAL DAILY
Status: DISCONTINUED | OUTPATIENT
Start: 2023-10-11 | End: 2023-10-11 | Stop reason: HOSPADM

## 2023-10-10 RX ORDER — OXYCODONE HYDROCHLORIDE 5 MG/1
5 TABLET ORAL
Status: DISCONTINUED | OUTPATIENT
Start: 2023-10-10 | End: 2023-10-11 | Stop reason: HOSPADM

## 2023-10-10 RX ORDER — DIMENHYDRINATE 50 MG/ML
25 INJECTION, SOLUTION INTRAMUSCULAR; INTRAVENOUS
Status: DISCONTINUED | OUTPATIENT
Start: 2023-10-10 | End: 2023-10-10 | Stop reason: HOSPADM

## 2023-10-10 RX ORDER — DEXAMETHASONE SODIUM PHOSPHATE 10 MG/ML
INJECTION, SOLUTION INTRAMUSCULAR; INTRAVENOUS PRN
Status: DISCONTINUED | OUTPATIENT
Start: 2023-10-10 | End: 2023-10-10

## 2023-10-10 RX ORDER — ACETAMINOPHEN 325 MG/1
650 TABLET ORAL EVERY 4 HOURS PRN
Status: DISCONTINUED | OUTPATIENT
Start: 2023-10-13 | End: 2023-10-11 | Stop reason: HOSPADM

## 2023-10-10 RX ORDER — FENTANYL CITRATE 50 UG/ML
INJECTION, SOLUTION INTRAMUSCULAR; INTRAVENOUS PRN
Status: DISCONTINUED | OUTPATIENT
Start: 2023-10-10 | End: 2023-10-10

## 2023-10-10 RX ORDER — ONDANSETRON 4 MG/1
4 TABLET, ORALLY DISINTEGRATING ORAL EVERY 6 HOURS PRN
Status: DISCONTINUED | OUTPATIENT
Start: 2023-10-10 | End: 2023-10-11 | Stop reason: HOSPADM

## 2023-10-10 RX ORDER — ONDANSETRON 4 MG/1
4 TABLET, ORALLY DISINTEGRATING ORAL EVERY 30 MIN PRN
Status: DISCONTINUED | OUTPATIENT
Start: 2023-10-10 | End: 2023-10-11 | Stop reason: HOSPADM

## 2023-10-10 RX ORDER — OXYCODONE HYDROCHLORIDE 5 MG/1
10 TABLET ORAL EVERY 4 HOURS PRN
Status: DISCONTINUED | OUTPATIENT
Start: 2023-10-10 | End: 2023-10-11 | Stop reason: HOSPADM

## 2023-10-10 RX ADMIN — ONDANSETRON 4 MG: 2 INJECTION INTRAMUSCULAR; INTRAVENOUS at 15:40

## 2023-10-10 RX ADMIN — PHENYLEPHRINE HYDROCHLORIDE 0.3 MCG/KG/MIN: 10 INJECTION INTRAVENOUS at 14:53

## 2023-10-10 RX ADMIN — SODIUM CHLORIDE, POTASSIUM CHLORIDE, SODIUM LACTATE AND CALCIUM CHLORIDE: 600; 310; 30; 20 INJECTION, SOLUTION INTRAVENOUS at 12:37

## 2023-10-10 RX ADMIN — PRIMIDONE 50 MG: 50 TABLET ORAL at 21:03

## 2023-10-10 RX ADMIN — PHENYLEPHRINE HYDROCHLORIDE 100 MCG: 10 INJECTION INTRAVENOUS at 14:57

## 2023-10-10 RX ADMIN — LIDOCAINE HYDROCHLORIDE 5 ML: 10 INJECTION, SOLUTION INFILTRATION; PERINEURAL at 14:41

## 2023-10-10 RX ADMIN — ACETAMINOPHEN 975 MG: 325 TABLET ORAL at 13:19

## 2023-10-10 RX ADMIN — ACETAMINOPHEN 975 MG: 325 TABLET ORAL at 18:51

## 2023-10-10 RX ADMIN — HYDROMORPHONE HYDROCHLORIDE 0.2 MG: 0.2 INJECTION, SOLUTION INTRAMUSCULAR; INTRAVENOUS; SUBCUTANEOUS at 17:44

## 2023-10-10 RX ADMIN — PROPOFOL 100 MG: 10 INJECTION, EMULSION INTRAVENOUS at 14:42

## 2023-10-10 RX ADMIN — FENTANYL CITRATE 25 MCG: 50 INJECTION INTRAMUSCULAR; INTRAVENOUS at 15:29

## 2023-10-10 RX ADMIN — FENTANYL CITRATE 100 MCG: 50 INJECTION INTRAMUSCULAR; INTRAVENOUS at 14:41

## 2023-10-10 RX ADMIN — Medication 2 G: at 14:33

## 2023-10-10 RX ADMIN — DEXAMETHASONE SODIUM PHOSPHATE 10 MG: 10 INJECTION, SOLUTION INTRAMUSCULAR; INTRAVENOUS at 14:41

## 2023-10-10 RX ADMIN — LISINOPRIL 10 MG: 5 TABLET ORAL at 17:43

## 2023-10-10 RX ADMIN — SIMVASTATIN 40 MG: 10 TABLET, FILM COATED ORAL at 21:03

## 2023-10-10 RX ADMIN — PHENYLEPHRINE HYDROCHLORIDE 100 MCG: 10 INJECTION INTRAVENOUS at 14:48

## 2023-10-10 RX ADMIN — FENTANYL CITRATE 25 MCG: 50 INJECTION INTRAMUSCULAR; INTRAVENOUS at 15:24

## 2023-10-10 RX ADMIN — SENNOSIDES AND DOCUSATE SODIUM 1 TABLET: 50; 8.6 TABLET ORAL at 21:03

## 2023-10-10 RX ADMIN — PROPOFOL 50 MG: 10 INJECTION, EMULSION INTRAVENOUS at 14:43

## 2023-10-10 ASSESSMENT — ACTIVITIES OF DAILY LIVING (ADL)
ADLS_ACUITY_SCORE: 30
ADLS_ACUITY_SCORE: 28
ADLS_ACUITY_SCORE: 32
ADLS_ACUITY_SCORE: 26
ADLS_ACUITY_SCORE: 39
ADLS_ACUITY_SCORE: 32

## 2023-10-10 ASSESSMENT — LIFESTYLE VARIABLES: TOBACCO_USE: 1

## 2023-10-10 ASSESSMENT — COPD QUESTIONNAIRES: COPD: 1

## 2023-10-10 NOTE — ANESTHESIA PROCEDURE NOTES
Airway       Patient location during procedure: OR  Staff -        CRNA: Natasha Bell APRN CRNA       Performed By: CRNA  Consent for Airway        Urgency: elective  Indications and Patient Condition       Indications for airway management: juni-procedural       Induction type:intravenous       Mask difficulty assessment: 1 - vent by mask    Final Airway Details       Final airway type: supraglottic airway    Supraglottic Airway Details        Type: LMA       Brand: Ambu AuraGain       LMA size: 5    Post intubation assessment        Placement verified by: capnometry, equal breath sounds and chest rise        Number of attempts at approach: 1       Number of other approaches attempted: 0       Secured with: silk tape       Ease of procedure: easy       Dentition: Intact and Unchanged

## 2023-10-10 NOTE — OP NOTE
Date of surgery: 10/10/2023    Place of Surgery:  [facility]    Surgeon: Dr. Derik Hernández    Anesthesia: General    Preoperative diagnosis:  History of bladder cancer, HG Ta    Postoperative diagnosis: same    Procedure: Cystoscopy, TURBT >5cm    Operative indication: 79 y/o male with large high grade but noninvasive bladder cancer s/p TURBT 08/30/23.  He now presents for re-resection    Operative findings: There is no obvious residual cancer.  There is a questionable area of residual tumor, appearing as slightly raised erythema, at the borders of the previous resection.  These are resected.  The entire area previously treated appears to be healing well and is remucosalizing.  The entire area of previous resection was cauterized.    Operative procedure: The patient was brought to the operating room.  He was placed under general anesthesia.  He was placed in the dorsolithotomy position and prepped and draped in sterile sterile fashion.  IV antibiotics were given for prophylaxis.    A 27 Romanian resectoscope sheath was placed through the urethra into the bladder under direct vision.  The bladder was completely inspected with the findings noted above.  There was no obvious residual or persistent cancer.  There were several areas at the periphery of the previous resection which were resected down to muscle fibers.  These areas as well as the entire area of previous resection was cauterized using the loop.  There was good hemostasis.  A 24 Romanian three-way Clark catheter was placed and CBI was started.  The patient was awoken from anesthesia.  He tolerated the procedure well and was brought to the recovery room in stable condition    Drains: 24 Fr 3-Way clark catheter    Specimens: Bladder tumor    Estimated blood loss: 5cc    Complications: None    Derik Hernández MD

## 2023-10-10 NOTE — PROVIDER NOTIFICATION
Pt arrived on unit from PACU s/p cystoscopy, bipolar transurethral resection of bladder tumor and triggered sepsis protocol.   - Vital signs to be expected post surgery. 97.5 F oral, 18, 92 HR, 159/75. Denies pain. CBI running. Risk factors were not related to clinical presentation.     Paged on-call Urology: Dr. Simpson    - Reviewed situation. T.O. to not do sepsis protocol.      Will continue to monitor and perform post op vitals as ordered.     Shyla Suarez RN-BC

## 2023-10-10 NOTE — ANESTHESIA PREPROCEDURE EVALUATION
Anesthesia Pre-Procedure Evaluation    Patient: Kelechi Kendrick   MRN: [id number] : 1945        Procedure : Procedure(s):  CYSTOSCOPY, BIPOLAR TRANSURETHRAL RESECTION BLADDER TUMOR          Past Medical History:   Diagnosis Date    COPD (chronic obstructive pulmonary disease) (H)     Diabetes mellitus, type II (H)     HLD (hyperlipidemia)     Hyperlipidemia     Hypertension     Personal history of malignant neoplasm of bladder     Tremor       Past Surgical History:   Procedure Laterality Date    HERNIA REPAIR      ORTHOPEDIC SURGERY        Allergies   Allergen Reactions    Tdvax [Tetanus-Diphtheria Toxoids Td]      Other reaction(s): swelling    Tetanus Toxoid, Adsorbed [Tetanus Toxoid] Swelling    Naproxen Rash     Other reaction(s): rash, nausea      Social History     Tobacco Use    Smoking status: Former     Packs/day: 0.40     Years: 30.00     Pack years: 12.00     Types: Cigarettes    Smokeless tobacco: Never   Substance Use Topics    Alcohol use: No      Wt Readings from Last 1 Encounters:   10/10/23 80.1 kg (176 lb 9.6 oz)        Anesthesia Evaluation            ROS/MED HX  ENT/Pulmonary:     (+)                tobacco use, Past use,        COPD,              Neurologic:       Cardiovascular:     (+) Dyslipidemia hypertension- -   -  - -                                      METS/Exercise Tolerance: >4 METS    Hematologic:       Musculoskeletal:       GI/Hepatic:    (-) GERD   Renal/Genitourinary:       Endo:     (+)  type II DM,                    Psychiatric/Substance Use:       Infectious Disease:       Malignancy:       Other:            Physical Exam    Airway  airway exam normal      Mallampati: II   TM distance: > 3 FB   Neck ROM: full   Mouth opening: > 3 cm    Respiratory Devices and Support         Dental  no notable dental history   Comment: Upper plate      (+) Modest Abnormalities - crowns, retainers, 1 or 2 missing teeth      Cardiovascular   cardiovascular exam normal        Rhythm and rate: regular and normal     Pulmonary   pulmonary exam normal        breath sounds clear to auscultation           OUTSIDE LABS:  CBC:   Lab Results   Component Value Date    WBC 8.2 05/17/2019    WBC 5.9 05/13/2019    HGB 15.5 07/09/2019    HGB 14.0 05/17/2019    HCT 42.9 05/17/2019    HCT 45.5 05/13/2019     05/17/2019     05/15/2019     BMP:   Lab Results   Component Value Date     09/27/2023     08/24/2023    POTASSIUM 4.8 09/27/2023    POTASSIUM 5.7 (H) 08/24/2023    CHLORIDE 96 (L) 09/27/2023    CHLORIDE 98 08/24/2023    CO2 26 09/27/2023    CO2 25 08/24/2023    BUN 10.3 09/27/2023    BUN 15.6 08/24/2023    CR 0.66 (L) 09/27/2023    CR 0.74 08/24/2023     (H) 09/27/2023    GLC 83 08/24/2023     COAGS:   Lab Results   Component Value Date    PTT 34 07/25/2018    INR 1.08 07/25/2018     POC: No results found for: BGM, HCG, HCGS  HEPATIC:   Lab Results   Component Value Date    ALBUMIN 3.9 08/24/2023    PROTTOTAL 7.2 08/24/2023    ALT 17 08/24/2023    AST  08/24/2023      Comment:      Unsatisfactory specimen - hemolyzed   Reference intervals for this test were updated on 6/12/2023 to more accurately reflect our healthy population. There may be differences in the flagging of prior results with similar values performed with this method. Interpretation of those prior results can be made in the context of the updated reference intervals.    ALKPHOS 80 08/24/2023    BILITOTAL 0.3 08/24/2023     OTHER:   Lab Results   Component Value Date    LACT 1.0 05/11/2019    A1C 8.7 (H) 05/11/2019    RODRIGUEZ 9.2 09/27/2023    MAG 2.1 05/17/2019    LIPASE 45 07/02/2023       Anesthesia Plan    ASA Status:  3       Anesthesia Type: General.     - Airway: LMA              Consents            Postoperative Care    Pain management: Multi-modal analgesia.   PONV prophylaxis: Dexamethasone or Solumedrol, Ondansetron (or other 5HT-3)     Comments:                Hina Ken MD

## 2023-10-10 NOTE — ANESTHESIA POSTPROCEDURE EVALUATION
Patient: Kelechi Kendrick    Procedure: Procedure(s):  CYSTOSCOPY, BIPOLAR TRANSURETHRAL RESECTION BLADDER TUMOR       Anesthesia Type:  General    Note:  Disposition: Inpatient   Postop Pain Control: Uneventful            Sign Out: Well controlled pain   PONV: No   Neuro/Psych: Uneventful            Sign Out: Acceptable/Baseline neuro status   Airway/Respiratory: Uneventful            Sign Out: Acceptable/Baseline resp. status   CV/Hemodynamics: Uneventful            Sign Out: Acceptable CV status; No obvious hypovolemia; No obvious fluid overload   Other NRE:    DID A NON-ROUTINE EVENT OCCUR?            Last vitals:  Vitals Value Taken Time   /75 10/10/23 1645   Temp     Pulse 93 10/10/23 1652   Resp 20 10/10/23 1652   SpO2 98 % 10/10/23 1653   Vitals shown include unvalidated device data.    Electronically Signed By: Raymundo Greenwood MD  October 10, 2023  6:12 PM

## 2023-10-10 NOTE — ANESTHESIA CARE TRANSFER NOTE
Patient: Kelechi Kendrick    Procedure: Procedure(s):  CYSTOSCOPY, BIPOLAR TRANSURETHRAL RESECTION BLADDER TUMOR       Diagnosis: Malignant tumor of urinary bladder (H) [C67.9]  Diagnosis Additional Information: No value filed.    Anesthesia Type:   General     Note:    Oropharynx: oropharynx clear of all foreign objects and spontaneously breathing  Level of Consciousness: awake  Oxygen Supplementation: face mask  Level of Supplemental Oxygen (L/min / FiO2): 6  Independent Airway: airway patency satisfactory and stable  Dentition: dentition unchanged  Vital Signs Stable: post-procedure vital signs reviewed and stable  Report to RN Given: handoff report given  Patient transferred to: PACU    Handoff Report: Identifed the Patient, Identified the Reponsible Provider, Reviewed the pertinent medical history, Discussed the surgical course, Reviewed Intra-OP anesthesia mangement and issues during anesthesia, Set expectations for post-procedure period and Allowed opportunity for questions and acknowledgement of understanding    Vitals:  Vitals Value Taken Time   /89 10/10/23 1602   Temp     Pulse 107 10/10/23 1606   Resp 24 10/10/23 1606   SpO2 99 % 10/10/23 1606   Vitals shown include unvalidated device data.    Electronically Signed By: DAYTON Perdomo CRNA  October 10, 2023  4:07 PM

## 2023-10-10 NOTE — PHARMACY-ADMISSION MEDICATION HISTORY
Pharmacist Admission Medication History    Admission medication history is complete. The information provided in this note is only as accurate as the sources available at the time of the update.    Information Source(s): Patient, Clinic records, and CareEverywhere/SureScripts via in-person    Pertinent Information: none       Allergies reviewed with patient and updates made in EHR: yes    Medication History Completed By: Lucien Call [facility] 10/10/2023 12:16 PM    PTA Med List   Medication Sig Last Dose    albuterol (PROAIR HFA/PROVENTIL HFA/VENTOLIN HFA) 108 (90 Base) MCG/ACT inhaler Inhale 2 puffs into the lungs every 4 hours as needed for shortness of breath, wheezing or cough More than a month    aspirin 81 MG EC tablet [ASPIRIN 81 MG EC TABLET] Take 81 mg by mouth daily. 10/3/2023    cyanocobalamin (VITAMIN B-12) 1000 MCG tablet Take 1,000 mcg by mouth daily 10/9/2023    insulin detemir (LEVEMIR PEN) 100 UNIT/ML pen Inject 55 Units Subcutaneous at bedtime 10/9/2023 (27 units)    lisinopril (PRINIVIL,ZESTRIL) 10 MG tablet [LISINOPRIL (PRINIVIL,ZESTRIL) 10 MG TABLET] Take 10 mg by mouth daily. 10/9/2023    metFORMIN (GLUCOPHAGE) 500 MG tablet [METFORMIN (GLUCOPHAGE) 500 MG TABLET] Take 1,000 mg by mouth 2 (two) times a day with meals. 10/9/2023    pioglitazone (ACTOS) 15 MG tablet Take 15 mg by mouth daily 10/9/2023    primidone (MYSOLINE) 50 MG tablet [PRIMIDONE (MYSOLINE) 50 MG TABLET] Take 50 mg by mouth at bedtime. 10/9/2023    simvastatin (ZOCOR) 40 MG tablet Take 40 mg by mouth every evening 10/9/2023    umeclidinium-vilanterol (ANORO ELLIPTA) 62.5-25 MCG/ACT oral inhaler Inhale 1 puff into the lungs daily 10/9/2023 at 2100

## 2023-10-11 VITALS
OXYGEN SATURATION: 97 % | HEART RATE: 74 BPM | RESPIRATION RATE: 18 BRPM | TEMPERATURE: 98.7 F | WEIGHT: 176.6 LBS | DIASTOLIC BLOOD PRESSURE: 70 MMHG | SYSTOLIC BLOOD PRESSURE: 142 MMHG | BODY MASS INDEX: 29.39 KG/M2

## 2023-10-11 PROBLEM — C67.9 MALIGNANT NEOPLASM OF URINARY BLADDER, UNSPECIFIED SITE (H): Status: ACTIVE | Noted: 2023-10-11

## 2023-10-11 LAB
GLUCOSE BLDC GLUCOMTR-MCNC: 317 MG/DL (ref 70–99)
GLUCOSE BLDC GLUCOMTR-MCNC: 320 MG/DL (ref 70–99)
GLUCOSE BLDC GLUCOMTR-MCNC: 381 MG/DL (ref 70–99)
GLUCOSE BLDC GLUCOMTR-MCNC: 388 MG/DL (ref 70–99)

## 2023-10-11 PROCEDURE — 250N000013 HC RX MED GY IP 250 OP 250 PS 637: Performed by: UROLOGY

## 2023-10-11 PROCEDURE — 99232 SBSQ HOSP IP/OBS MODERATE 35: CPT | Performed by: INTERNAL MEDICINE

## 2023-10-11 PROCEDURE — 250N000012 HC RX MED GY IP 250 OP 636 PS 637: Performed by: INTERNAL MEDICINE

## 2023-10-11 PROCEDURE — G0378 HOSPITAL OBSERVATION PER HR: HCPCS

## 2023-10-11 RX ORDER — NICOTINE POLACRILEX 4 MG
15-30 LOZENGE BUCCAL
Status: DISCONTINUED | OUTPATIENT
Start: 2023-10-11 | End: 2023-10-11 | Stop reason: HOSPADM

## 2023-10-11 RX ORDER — ASPIRIN 81 MG/1
81 TABLET ORAL DAILY
COMMUNITY
Start: 2023-10-17

## 2023-10-11 RX ORDER — POLYETHYLENE GLYCOL 3350 17 G/17G
17 POWDER, FOR SOLUTION ORAL DAILY PRN
COMMUNITY
Start: 2023-10-11 | End: 2023-10-18

## 2023-10-11 RX ORDER — ACETAMINOPHEN 325 MG/1
325-650 TABLET ORAL EVERY 4 HOURS PRN
Status: ON HOLD | COMMUNITY
Start: 2023-10-13 | End: 2024-03-11

## 2023-10-11 RX ORDER — DEXTROSE MONOHYDRATE 25 G/50ML
25-50 INJECTION, SOLUTION INTRAVENOUS
Status: DISCONTINUED | OUTPATIENT
Start: 2023-10-11 | End: 2023-10-11 | Stop reason: HOSPADM

## 2023-10-11 RX ADMIN — INSULIN ASPART 8 UNITS: 100 INJECTION, SOLUTION INTRAVENOUS; SUBCUTANEOUS at 13:46

## 2023-10-11 RX ADMIN — ACETAMINOPHEN 975 MG: 325 TABLET ORAL at 10:56

## 2023-10-11 RX ADMIN — LISINOPRIL 10 MG: 5 TABLET ORAL at 09:13

## 2023-10-11 RX ADMIN — INSULIN ASPART 10 UNITS: 100 INJECTION, SOLUTION INTRAVENOUS; SUBCUTANEOUS at 17:22

## 2023-10-11 RX ADMIN — POLYETHYLENE GLYCOL 3350 17 G: 17 POWDER, FOR SOLUTION ORAL at 09:12

## 2023-10-11 RX ADMIN — ACETAMINOPHEN 975 MG: 325 TABLET ORAL at 02:41

## 2023-10-11 RX ADMIN — SENNOSIDES AND DOCUSATE SODIUM 1 TABLET: 50; 8.6 TABLET ORAL at 09:13

## 2023-10-11 ASSESSMENT — ACTIVITIES OF DAILY LIVING (ADL)
DEPENDENT_IADLS:: INDEPENDENT
ADLS_ACUITY_SCORE: 32

## 2023-10-11 NOTE — PLAN OF CARE
Problem: Risk for Delirium  Goal: Optimal Coping  Outcome: Progressing     Problem: Plan of Care - These are the overarching goals to be used throughout the patient stay.    Goal: Readiness for Transition of Care  Outcome: Progressing     Problem: Plan of Care - These are the overarching goals to be used throughout the patient stay.    Goal: Optimal Comfort and Wellbeing  Outcome: Progressing  Intervention: Monitor Pain and Promote Comfort  Recent Flowsheet Documentation  Taken 10/10/2023 1851 by Tammy Munoz, RN  Pain Management Interventions: medication (see MAR)  Taken 10/10/2023 1744 by Tammy Munoz, RN  Pain Management Interventions: medication (see MAR)   Goal Outcome Evaluation:  - /74 @ 1851 to 133/74 @ 1948  - Weaned to 1L O2 NC @ 93%   - Tolerating full liquid diet well  - CBI on low, light pink, few small clots    Tammy Munoz, RN

## 2023-10-11 NOTE — UTILIZATION REVIEW
"Admission Status; Secondary Review Determination     Under the authority of the Utilization Management Committee, the utilization review process indicated a secondary review on Kelechi Kendrick.  The review outcome is based on review of the medical records, discussions with staff, and applying clinical experience noted on the date of the review.     (x) Observation Status Appropriate - This patient does not meet hospital inpatient criteria and is placed in observation status. If this patient's primary payer is Medicare and was admitted as an inpatient, Condition Code 44 should be used and patient status changed to \"observation\".     RATIONALE FOR DETERMINATION   78 year old male who was admitted on 10/10/2023 for elective cystoscopy for bladder cancer.  POD#1, progressing well , anticipate discharge today     The severity of illness, intensity of service provided, expected LOS and risk for adverse outcome make the care appropriate for further observation; however, doesn't meet criteria for hospital inpatient admission.  Harman Contreras CNP is notified of this determination and agrees with downgrade of status.      The information on this document is developed by the utilization review team in order for the business office to ensure compliance.  This only denotes the appropriateness of proper admission status and does not reflect the quality of care rendered.         The definitions of Inpatient Status and Observation Status used in making the determination above are those provided in the CMS Coverage Manual, Chapter 1 and Chapter 6, section 70.4.      Sincerely,  Blaine Drummond MD  Utilization Review  Physician Advisor  [facility]  "

## 2023-10-11 NOTE — PROGRESS NOTES
Urine output progressing from dark red to watermelon color since clark removal. Voiding small amounts with increasing PVR trend of 95, 127, 210. Discussed with Urology PA. Patient states urine quality and voiding ability improved comparative to previous post-op experience and expresses confidence at managing complications at home. Independent in room.     Patient discharged home via family transport.  Reviewed AVS with patient and daughter, focusing on urology instructions and when to call the clinic.

## 2023-10-11 NOTE — PROGRESS NOTES
[facility]    Hospitalist Progress Note    Assessment & Plan   78 year old male who was admitted on 10/10/2023 for elective cystoscopy for bladder cancer.     Impression:   Principal Problem:    Bladder cancer (H)   - anticipate discharge home today   (Discussed with Urology)      DM type 2, Hgb A1C 8.7 on 5/11/19   -- Hgb A1C in clinic >10, patient does not have a glucometer and clinic no longer has a diabetic educator   -- spoke with Ramiro Person -- they will see him in clinic and help with blood sugar control      Hypertension      COPD (chronic obstructive pulmonary disease) (H)      Plan:  as above    DVT Prophylaxis: Pneumatic Compression Devices  Code Status: No CPR- Do NOT Intubate    Disposition: Expected discharge home today.  Discussed with son.     Higinio Pandya MD Max  Pager [phone]  Cell Phone [phone]  Text Page (7am to 6pm)    Interval History   Feels OK, wants to go home.  Slight blood in urine yet, is off bladder irrigation.     Physical Exam   Temp: 98.2  F (36.8  C) Temp src: Oral BP: 128/62 Pulse: 90   Resp: 18 SpO2: 96 % O2 Device: None (Room air) Oxygen Delivery: 1 LPM  Vitals:    10/10/23 1134   Weight: 80.1 kg (176 lb 9.6 oz)     Vital Signs with Ranges  Temp:  [97.5  F (36.4  C)-99  F (37.2  C)] 98.2  F (36.8  C)  Pulse:  [] 90  Resp:  [16-23] 18  BP: (127-187)/(62-89) 128/62  SpO2:  [92 %-100 %] 96 %  I/O last 3 completed shifts:  In: 4060 [P.O.:3460; I.V.:600]  Out: 3505 [Urine:3500; Blood:5]    # Pain Assessment:      10/11/2023     9:00 AM   Current Pain Score   Patient currently in pain? denies   Kelechi s pain level was assessed and he currently denies pain.        Constitutional: Awake, alert, cooperative, no apparent distress  Respiratory: Clear to auscultation bilaterally, no crackles or wheezing  Cardiovascular: Regular rate and rhythm, normal S1 and S2, and no murmur noted  GI: Normal bowel sounds, soft, non-distended,  non-tender  Extrem: No calf tenderness, no ankle edema  Neuro: Ox3, no focal motor or sensory deficits    Medications    sodium chloride 0.9% irrigation (bag)        acetaminophen  975 mg Oral Q8H    insulin aspart  1-10 Units Subcutaneous TID AC    insulin aspart  1-7 Units Subcutaneous At Bedtime    insulin detemir  55 Units Subcutaneous At Bedtime    lisinopril  10 mg Oral Daily    polyethylene glycol  17 g Oral Daily    primidone  50 mg Oral At Bedtime    senna-docusate  1 tablet Oral BID    simvastatin  40 mg Oral QPM    sodium chloride (PF)  3 mL Intracatheter Q8H       Data   Recent Labs   Lab 10/11/23  1340 10/11/23  1047 10/11/23  0547   * [phone]*       Imaging:   No results found for this or any previous visit (from the past 24 hour(s)).

## 2023-10-11 NOTE — CONSULTS
Care Management Initial Consult    General Information  Assessment completed with: Patient,    Type of CM/SW Visit: Initial Assessment    Primary Care Provider verified and updated as needed: Yes   Readmission within the last 30 days:        Reason for Consult: discharge planning  Advance Care Planning: Advance Care Planning Reviewed: no concerns identified          Communication Assessment  Patient's communication style: spoken language (English or Bilingual)    Hearing Difficulty or Deaf: no   Wear Glasses or Blind: yes    Cognitive  Cognitive/Neuro/Behavioral: WDL     Arousal Level: opens eyes spontaneously           Speech: clear    Living Environment:   People in home: alone     Current living Arrangements: house      Able to return to prior arrangements:         Family/Social Support:  Care provided by: self  Provides care for:    Marital Status:   Children          Description of Support System: Supportive, Involved         Current Resources:   Patient receiving home care services: No     Community Resources: None  Equipment currently used at home: none  Supplies currently used at home: None    Employment/Financial:  Employment Status: retired        Financial Concerns:     Referral to Financial Worker: No       Does the patient's insurance plan have a 3 day qualifying hospital stay waiver?  Yes     Which insurance plan 3 day waiver is available? [employer]    Will the waiver be used for post-acute placement? Undetermined at this time    Lifestyle & Psychosocial Needs:  Social Determinants of Health     Food Insecurity: Not on file   Depression: Not on file   Housing Stability: Not on file   Tobacco Use: Medium Risk (10/10/2023)    Patient History     Smoking Tobacco Use: Former     Smokeless Tobacco Use: Never     Passive Exposure: Not on file   Financial Resource Strain: Not on file   Alcohol Use: Not on file   Transportation Needs: Not on file   Physical Activity: Not on file   Interpersonal Safety:  Not on file   Stress: Not on file   Social Connections: Not on file       Functional Status:  Prior to admission patient needed assistance:   Dependent ADLs:: Independent  Dependent IADLs:: Independent       Mental Health Status:          Chemical Dependency Status:                Values/Beliefs:  Spiritual, Cultural Beliefs, Denominational Practices, Values that affect care:                 Additional Information:  Assessed. Lives alone in a house. Independent with ADLs and IADLs. Family to transport. Daughter tiffany is main family contact. Likely no CM needs. Cm to follow.    Chela Bai RN

## 2023-10-11 NOTE — PLAN OF CARE
Problem: Plan of Care - These are the overarching goals to be used throughout the patient stay.    Goal: Optimal Comfort and Wellbeing  Outcome: Progressing  Intervention: Monitor Pain and Promote Comfort  Recent Flowsheet Documentation  Taken 10/10/2023 2330 by Zain Reardon RN  Pain Management Interventions: declines     Problem: Risk for Delirium  Goal: Improved Attention and Thought Clarity  Outcome: Progressing     Problem: Surgery Nonspecified  Goal: Fluid and Electrolyte Balance  Outcome: Progressing  Goal: Effective Urinary Elimination  Outcome: Progressing   Goal Outcome Evaluation:  VSS ex HTN. Denies pain. CBI at slow rate, draining clear/light pink. Bags changed x2. 1 L NC. Tolerating full liquid diet.     Zain Reardon, RN

## 2023-10-11 NOTE — DISCHARGE SUMMARY
[facility] DISCHARGE SUMMARY    Name: Kelechi Kendrick  : 1945  MRN: [id number]  PCP: Ramiro Person    Place of service: [facility]    Admission date: 10/10/2023   Discharge date: 10/11/2023     Surgeon: Dr Derik Hernández     Surgical Procedure: Cystoscopy, TURBT >5cm     Date of surgery: 10/10/2023    Principal Diagnosis at Discharge:   Malignant tumor of urinary bladder (H) [C67.9]    Other diagnosis addressed during hospitalization:  DM, Hyperglycemia    Consults:  Hospitalist    Diagnostic Studies :  None    Complications while in the Hospital:  None    Physical Exam:  Temp: 98.2  F (36.8  C) Temp src: Oral BP: 128/62 Pulse: 90   Resp: 18 SpO2: 96 % O2 Device: None (Room air) Oxygen Delivery: 1 LPM    Gen: nad, alert  Neuro: A&O x 3. Moving all extremities equally.   Resp: Reg resp rate/depth.   Abdomen: Non-tender, non- distended, no rebound or peritoneal signs  :  Penis and scrotum without erythema or edema. 3 way Buchanan draining  clear to faint pink translucent urine without clots, CBI off.   LE: CWMS intact    Brief history of hospital course:  This is a 78 year old male admitted for Malignant tumor of urinary bladder (H) [C67.9]. Patient underwent above procedure. Patient tolerated the procedure well. Post operative course was remarkable for hyperglycemia . Hospitalist was consulted to assist with diabetes management. Home meds and diabetic diet resumed, planning for follow up with PCP.  By the day of discharge, the patient was ambulatory, tolerating diet, pain was controlled with oral analgesics, labs and vitals stable. Buchanan catheter removed on POD#1, some GH d/t incomplete balloon deflation prior to removal. PVRs between 100 ml and 210 ml, urine reported lightening in color and light watermelon/light pink without clots at time of discharge.     Labs:  Hemoglobin   Date Value Ref Range Status   2019 15.5 7.0 g/dL Final   2019 14.0 14.0 - 18.0 g/dL  Final   ]  Platelet Count   Date Value Ref Range Status   05/17/2019 194 140 - 440 thou/uL Final     WBC   Date Value Ref Range Status   05/17/2019 8.2 4.0 - 11.0 thou/uL Final     Creatinine   Date Value Ref Range Status   09/27/2023 0.66 (L) 0.67 - 1.17 mg/dL Final   05/23/2019 0.80 0.66 - 1.25 mg/dL Final     Potassium   Date Value Ref Range Status   09/27/2023 4.8 3.4 - 5.3 mmol/L Final   12/10/2021 4.5 3.5 - 5.0 mmol/L Final   05/23/2019 4.3 3.4 - 5.3 mmol/L Final     INR   Date Value Ref Range Status   07/25/2018 1.08 0.90 - 1.10 Final        Pending Labs:  Surgical Pathology     DISPOSITION: Home    DISCHARGE CONDITION: Good/Stable    DISCHARGE MEDICATIONS:      Review of your medicines        START taking        Dose / Directions   acetaminophen 325 MG tablet  Commonly known as: TYLENOL      Dose: 325-650 mg  Start taking on: October 13, 2023  Take 1-2 tablets (325-650 mg) by mouth every 4 hours as needed for other or mild pain (For optimal non-opioid multimodal pain management to improve pain control.)  Refills: 0     polyethylene glycol 17 GM/Dose powder  Commonly known as: MIRALAX      Dose: 17 g  Take 17 g by mouth daily as needed for constipation Discontinue if your stools become loose.  Refills: 0            CONTINUE these medicines which may have CHANGED, or have new prescriptions. If we are uncertain of the size of tablets/capsules you have at home, strength may be listed as something that might have changed.        Dose / Directions   aspirin 81 MG EC tablet  This may have changed: These instructions start on October 17, 2023. If you are unsure what to do until then, ask your doctor or other care provider.      Dose: 81 mg  Start taking on: October 17, 2023  Take 1 tablet (81 mg) by mouth daily  Refills: 0            CONTINUE these medicines which have NOT CHANGED        Dose / Directions   albuterol 108 (90 Base) MCG/ACT inhaler  Commonly known as: PROAIR HFA/PROVENTIL HFA/VENTOLIN HFA      Dose: 2  puff  Inhale 2 puffs into the lungs every 4 hours as needed for shortness of breath, wheezing or cough  Refills: 0     cyanocobalamin 1000 MCG tablet  Commonly known as: VITAMIN B-12      Dose: 1,000 mcg  Take 1,000 mcg by mouth daily  Refills: 0     insulin detemir 100 UNIT/ML pen  Commonly known as: LEVEMIR PEN      Dose: 55 Units  Inject 55 Units Subcutaneous at bedtime  Refills: 0     lisinopril 10 MG tablet  Commonly known as: ZESTRIL      Dose: 10 mg  [LISINOPRIL (PRINIVIL,ZESTRIL) 10 MG TABLET] Take 10 mg by mouth daily.  Refills: 0     metFORMIN 500 MG tablet  Commonly known as: GLUCOPHAGE      Dose: 1,000 mg  [METFORMIN (GLUCOPHAGE) 500 MG TABLET] Take 1,000 mg by mouth 2 (two) times a day with meals.  Refills: 0     pioglitazone 15 MG tablet  Commonly known as: ACTOS      Dose: 15 mg  Take 15 mg by mouth daily  Refills: 0     primidone 50 MG tablet  Commonly known as: MYSOLINE      Dose: 50 mg  [PRIMIDONE (MYSOLINE) 50 MG TABLET] Take 50 mg by mouth at bedtime.  Refills: 0     simvastatin 40 MG tablet  Commonly known as: ZOCOR      Dose: 40 mg  Take 40 mg by mouth every evening  Refills: 0     umeclidinium-vilanterol 62.5-25 MCG/ACT oral inhaler  Commonly known as: Anoro Ellipta  Used for: COPD, group A, by GOLD 2017 classification (H)      Dose: 1 puff  Inhale 1 puff into the lungs daily  Quantity: 1 each  Refills: 11               Where to get your medicines        Some of these will need a paper prescription and others can be bought over the counter. Ask your nurse if you have questions.    You don't need a prescription for these medications  acetaminophen 325 MG tablet  aspirin 81 MG EC tablet  polyethylene glycol 17 GM/Dose powder         DISCHARGE PLAN:   - Follow up with Dr Derik Hernández as scheduled prior to your procedure   - Follow up with Ramiro Person as directed   - Take medication as prescribed, avoid anticoagulants per surgeon and PCP recommendations.   - Wound / drain care: As  directed prior to discharge  - Physical activity: As tolerated, no heavy lifting. No driving while taking narcotics.   - Diet: Regular diabetic diet  - Medication: please review discharge Med req/AVS  - Warning signs discussed with patient about when to call the clinic/hospital  - All questions and concerns were answered for the patient prior to discharge  - Patient verbalized understanding.     Discussed patient with Dr Derik Hernández on day of discharge.     Harman Contreras, APRN, CNP  [facility]   [phone]     I saw the patient on the date of discharge  Total time spent for discharge on date of discharge: 20 minutes    Physician(s) in addition to primary physician who should receive a copy:  CC1: Ramiro Person           ?

## 2023-10-11 NOTE — PROGRESS NOTES
Urine output clear and light yellow in color on slow drip CBI. Patient walked in hallway. Output remains clear and clot free with only slight pink tinge. CBI clamped at 0945. Patient continues to produce urine clear of any blood clots.

## 2023-10-12 LAB
PATH REPORT.COMMENTS IMP SPEC: NORMAL
PATH REPORT.COMMENTS IMP SPEC: NORMAL
PATH REPORT.FINAL DX SPEC: NORMAL
PATH REPORT.GROSS SPEC: NORMAL
PATH REPORT.MICROSCOPIC SPEC OTHER STN: NORMAL
PATH REPORT.RELEVANT HX SPEC: NORMAL
PHOTO IMAGE: NORMAL

## 2023-10-12 PROCEDURE — 88307 TISSUE EXAM BY PATHOLOGIST: CPT | Mod: 26 | Performed by: PATHOLOGY

## 2023-12-13 ENCOUNTER — LAB REQUISITION (OUTPATIENT)
Dept: LAB | Facility: CLINIC | Age: 78
End: 2023-12-13
Payer: MEDICARE

## 2023-12-13 DIAGNOSIS — R55 SYNCOPE AND COLLAPSE: ICD-10-CM

## 2023-12-13 DIAGNOSIS — R53.1 WEAKNESS: ICD-10-CM

## 2023-12-13 DIAGNOSIS — E11.9 TYPE 2 DIABETES MELLITUS WITHOUT COMPLICATIONS (H): ICD-10-CM

## 2023-12-13 DIAGNOSIS — R82.998 OTHER ABNORMAL FINDINGS IN URINE: ICD-10-CM

## 2023-12-13 PROCEDURE — 82550 ASSAY OF CK (CPK): CPT | Mod: ORL | Performed by: STUDENT IN AN ORGANIZED HEALTH CARE EDUCATION/TRAINING PROGRAM

## 2023-12-13 PROCEDURE — 87086 URINE CULTURE/COLONY COUNT: CPT | Mod: ORL | Performed by: STUDENT IN AN ORGANIZED HEALTH CARE EDUCATION/TRAINING PROGRAM

## 2023-12-13 PROCEDURE — 82570 ASSAY OF URINE CREATININE: CPT | Mod: ORL | Performed by: STUDENT IN AN ORGANIZED HEALTH CARE EDUCATION/TRAINING PROGRAM

## 2023-12-13 PROCEDURE — 80053 COMPREHEN METABOLIC PANEL: CPT | Mod: ORL | Performed by: STUDENT IN AN ORGANIZED HEALTH CARE EDUCATION/TRAINING PROGRAM

## 2023-12-14 LAB
ALBUMIN SERPL BCG-MCNC: 4.1 G/DL (ref 3.5–5.2)
ALP SERPL-CCNC: 80 U/L (ref 40–150)
ALT SERPL W P-5'-P-CCNC: 54 U/L (ref 0–70)
ANION GAP SERPL CALCULATED.3IONS-SCNC: 11 MMOL/L (ref 7–15)
AST SERPL W P-5'-P-CCNC: 161 U/L (ref 0–45)
BILIRUB SERPL-MCNC: 0.4 MG/DL
BUN SERPL-MCNC: 24.3 MG/DL (ref 8–23)
CALCIUM SERPL-MCNC: 8.9 MG/DL (ref 8.8–10.2)
CHLORIDE SERPL-SCNC: 96 MMOL/L (ref 98–107)
CK SERPL-CCNC: 5538 U/L (ref 39–308)
CREAT SERPL-MCNC: 0.72 MG/DL (ref 0.67–1.17)
CREAT UR-MCNC: 25.3 MG/DL
DEPRECATED HCO3 PLAS-SCNC: 26 MMOL/L (ref 22–29)
EGFRCR SERPLBLD CKD-EPI 2021: >90 ML/MIN/1.73M2
GLUCOSE SERPL-MCNC: 224 MG/DL (ref 70–99)
MICROALBUMIN UR-MCNC: 24.4 MG/L
MICROALBUMIN/CREAT UR: 96.44 MG/G CR (ref 0–17)
POTASSIUM SERPL-SCNC: 4.9 MMOL/L (ref 3.4–5.3)
PROT SERPL-MCNC: 7.4 G/DL (ref 6.4–8.3)
SODIUM SERPL-SCNC: 133 MMOL/L (ref 135–145)

## 2023-12-15 LAB — BACTERIA UR CULT: NORMAL

## 2023-12-18 ENCOUNTER — LAB REQUISITION (OUTPATIENT)
Dept: LAB | Facility: CLINIC | Age: 78
End: 2023-12-18
Payer: MEDICARE

## 2023-12-18 DIAGNOSIS — I10 ESSENTIAL (PRIMARY) HYPERTENSION: ICD-10-CM

## 2023-12-18 LAB
ALBUMIN SERPL BCG-MCNC: 4 G/DL (ref 3.5–5.2)
ALP SERPL-CCNC: 80 U/L (ref 40–150)
ALT SERPL W P-5'-P-CCNC: 30 U/L (ref 0–70)
ANION GAP SERPL CALCULATED.3IONS-SCNC: 10 MMOL/L (ref 7–15)
AST SERPL W P-5'-P-CCNC: 36 U/L (ref 0–45)
BILIRUB SERPL-MCNC: 0.3 MG/DL
BUN SERPL-MCNC: 16.4 MG/DL (ref 8–23)
CALCIUM SERPL-MCNC: 9.2 MG/DL (ref 8.8–10.2)
CHLORIDE SERPL-SCNC: 95 MMOL/L (ref 98–107)
CREAT SERPL-MCNC: 0.75 MG/DL (ref 0.67–1.17)
DEPRECATED HCO3 PLAS-SCNC: 27 MMOL/L (ref 22–29)
EGFRCR SERPLBLD CKD-EPI 2021: >90 ML/MIN/1.73M2
GLUCOSE SERPL-MCNC: 253 MG/DL (ref 70–99)
POTASSIUM SERPL-SCNC: 4.9 MMOL/L (ref 3.4–5.3)
PROT SERPL-MCNC: 7.2 G/DL (ref 6.4–8.3)
SODIUM SERPL-SCNC: 132 MMOL/L (ref 135–145)

## 2023-12-18 PROCEDURE — 80053 COMPREHEN METABOLIC PANEL: CPT | Mod: ORL | Performed by: STUDENT IN AN ORGANIZED HEALTH CARE EDUCATION/TRAINING PROGRAM

## 2024-02-01 NOTE — INTERVAL H&P NOTE
I have reviewed the surgical (or preoperative) H&P that is linked to this encounter, and examined the patient. There are no significant changes    Clinical Conditions Present on Arrival:  Clinically Significant Risk Factors Present on Admission         # Hyponatremia: Lowest Na = 135 mmol/L in last 30 days, will monitor as appropriate         # Drug Induced Platelet Defect: home medication list includes an antiplatelet medication        .

## 2024-03-07 ENCOUNTER — LAB REQUISITION (OUTPATIENT)
Dept: LAB | Facility: CLINIC | Age: 79
End: 2024-03-07
Payer: MEDICARE

## 2024-03-07 DIAGNOSIS — C67.9 MALIGNANT NEOPLASM OF BLADDER, UNSPECIFIED (H): ICD-10-CM

## 2024-03-07 DIAGNOSIS — J44.9 COPD, GROUP A, BY GOLD 2017 CLASSIFICATION (H): ICD-10-CM

## 2024-03-07 PROCEDURE — 80053 COMPREHEN METABOLIC PANEL: CPT | Mod: ORL | Performed by: STUDENT IN AN ORGANIZED HEALTH CARE EDUCATION/TRAINING PROGRAM

## 2024-03-07 RX ORDER — UMECLIDINIUM BROMIDE AND VILANTEROL TRIFENATATE 62.5; 25 UG/1; UG/1
1 POWDER RESPIRATORY (INHALATION) DAILY
Qty: 60 EACH | Refills: 0 | Status: SHIPPED | OUTPATIENT
Start: 2024-03-07 | End: 2024-03-20

## 2024-03-08 LAB
ALBUMIN SERPL BCG-MCNC: 4.5 G/DL (ref 3.5–5.2)
ALP SERPL-CCNC: 87 U/L (ref 40–150)
ALT SERPL W P-5'-P-CCNC: 16 U/L (ref 0–70)
ANION GAP SERPL CALCULATED.3IONS-SCNC: 9 MMOL/L (ref 7–15)
AST SERPL W P-5'-P-CCNC: 16 U/L (ref 0–45)
BILIRUB SERPL-MCNC: 0.3 MG/DL
BUN SERPL-MCNC: 15 MG/DL (ref 8–23)
CALCIUM SERPL-MCNC: 9.4 MG/DL (ref 8.8–10.2)
CHLORIDE SERPL-SCNC: 97 MMOL/L (ref 98–107)
CREAT SERPL-MCNC: 0.96 MG/DL (ref 0.67–1.17)
DEPRECATED HCO3 PLAS-SCNC: 27 MMOL/L (ref 22–29)
EGFRCR SERPLBLD CKD-EPI 2021: 80 ML/MIN/1.73M2
GLUCOSE SERPL-MCNC: 256 MG/DL (ref 70–99)
POTASSIUM SERPL-SCNC: 5.9 MMOL/L (ref 3.4–5.3)
PROT SERPL-MCNC: 7.6 G/DL (ref 6.4–8.3)
SODIUM SERPL-SCNC: 133 MMOL/L (ref 135–145)

## 2024-03-11 ENCOUNTER — ANESTHESIA (OUTPATIENT)
Dept: SURGERY | Facility: HOSPITAL | Age: 79
End: 2024-03-11
Payer: MEDICARE

## 2024-03-11 ENCOUNTER — HOSPITAL ENCOUNTER (OUTPATIENT)
Facility: HOSPITAL | Age: 79
Discharge: HOME | End: 2024-03-11
Attending: UROLOGY | Admitting: UROLOGY
Payer: MEDICARE

## 2024-03-11 ENCOUNTER — ANESTHESIA EVENT (OUTPATIENT)
Dept: SURGERY | Facility: HOSPITAL | Age: 79
End: 2024-03-11
Payer: MEDICARE

## 2024-03-11 VITALS
HEART RATE: 95 BPM | WEIGHT: 185.5 LBS | SYSTOLIC BLOOD PRESSURE: 135 MMHG | OXYGEN SATURATION: 95 % | TEMPERATURE: 99.6 F | RESPIRATION RATE: 23 BRPM | BODY MASS INDEX: 30.91 KG/M2 | HEIGHT: 65 IN | DIASTOLIC BLOOD PRESSURE: 65 MMHG

## 2024-03-11 DIAGNOSIS — C67.9 MALIGNANT NEOPLASM OF URINARY BLADDER, UNSPECIFIED SITE (H): Primary | ICD-10-CM

## 2024-03-11 LAB
GLUCOSE BLDC GLUCOMTR-MCNC: 152 MG/DL (ref 70–99)
GLUCOSE BLDC GLUCOMTR-MCNC: 173 MG/DL (ref 70–99)

## 2024-03-11 PROCEDURE — 250N000011 HC RX IP 250 OP 636: Performed by: NURSE PRACTITIONER

## 2024-03-11 PROCEDURE — 710N000009 HC RECOVERY PHASE 1, LEVEL 1, PER MIN: Performed by: UROLOGY

## 2024-03-11 PROCEDURE — 370N000017 HC ANESTHESIA TECHNICAL FEE, PER MIN: Performed by: UROLOGY

## 2024-03-11 PROCEDURE — 88342 IMHCHEM/IMCYTCHM 1ST ANTB: CPT | Mod: TC | Performed by: UROLOGY

## 2024-03-11 PROCEDURE — 250N000025 HC SEVOFLURANE, PER MIN: Performed by: UROLOGY

## 2024-03-11 PROCEDURE — 258N000003 HC RX IP 258 OP 636: Performed by: NURSE ANESTHETIST, CERTIFIED REGISTERED

## 2024-03-11 PROCEDURE — 250N000009 HC RX 250: Performed by: NURSE ANESTHETIST, CERTIFIED REGISTERED

## 2024-03-11 PROCEDURE — 82962 GLUCOSE BLOOD TEST: CPT

## 2024-03-11 PROCEDURE — 360N000076 HC SURGERY LEVEL 3, PER MIN: Performed by: UROLOGY

## 2024-03-11 PROCEDURE — 999N000141 HC STATISTIC PRE-PROCEDURE NURSING ASSESSMENT: Performed by: UROLOGY

## 2024-03-11 PROCEDURE — 258N000003 HC RX IP 258 OP 636: Performed by: ANESTHESIOLOGY

## 2024-03-11 PROCEDURE — 710N000012 HC RECOVERY PHASE 2, PER MINUTE: Performed by: UROLOGY

## 2024-03-11 PROCEDURE — 250N000009 HC RX 250: Performed by: UROLOGY

## 2024-03-11 PROCEDURE — 250N000011 HC RX IP 250 OP 636: Performed by: NURSE ANESTHETIST, CERTIFIED REGISTERED

## 2024-03-11 PROCEDURE — 272N000001 HC OR GENERAL SUPPLY STERILE: Performed by: UROLOGY

## 2024-03-11 RX ORDER — ONDANSETRON 2 MG/ML
4 INJECTION INTRAMUSCULAR; INTRAVENOUS EVERY 30 MIN PRN
Status: DISCONTINUED | OUTPATIENT
Start: 2024-03-11 | End: 2024-03-11 | Stop reason: HOSPADM

## 2024-03-11 RX ORDER — DEXAMETHASONE SODIUM PHOSPHATE 10 MG/ML
INJECTION, SOLUTION INTRAMUSCULAR; INTRAVENOUS PRN
Status: DISCONTINUED | OUTPATIENT
Start: 2024-03-11 | End: 2024-03-11

## 2024-03-11 RX ORDER — NALOXONE HYDROCHLORIDE 0.4 MG/ML
0.1 INJECTION, SOLUTION INTRAMUSCULAR; INTRAVENOUS; SUBCUTANEOUS
Status: DISCONTINUED | OUTPATIENT
Start: 2024-03-11 | End: 2024-03-11 | Stop reason: HOSPADM

## 2024-03-11 RX ORDER — PROPOFOL 10 MG/ML
INJECTION, EMULSION INTRAVENOUS PRN
Status: DISCONTINUED | OUTPATIENT
Start: 2024-03-11 | End: 2024-03-11

## 2024-03-11 RX ORDER — FENTANYL CITRATE 50 UG/ML
25 INJECTION, SOLUTION INTRAMUSCULAR; INTRAVENOUS EVERY 5 MIN PRN
Status: DISCONTINUED | OUTPATIENT
Start: 2024-03-11 | End: 2024-03-11 | Stop reason: HOSPADM

## 2024-03-11 RX ORDER — HYDROMORPHONE HYDROCHLORIDE 1 MG/ML
0.2 INJECTION, SOLUTION INTRAMUSCULAR; INTRAVENOUS; SUBCUTANEOUS EVERY 5 MIN PRN
Status: DISCONTINUED | OUTPATIENT
Start: 2024-03-11 | End: 2024-03-11 | Stop reason: HOSPADM

## 2024-03-11 RX ORDER — KETAMINE HYDROCHLORIDE 10 MG/ML
INJECTION INTRAMUSCULAR; INTRAVENOUS PRN
Status: DISCONTINUED | OUTPATIENT
Start: 2024-03-11 | End: 2024-03-11

## 2024-03-11 RX ORDER — HYDROMORPHONE HYDROCHLORIDE 1 MG/ML
0.4 INJECTION, SOLUTION INTRAMUSCULAR; INTRAVENOUS; SUBCUTANEOUS EVERY 5 MIN PRN
Status: DISCONTINUED | OUTPATIENT
Start: 2024-03-11 | End: 2024-03-11 | Stop reason: HOSPADM

## 2024-03-11 RX ORDER — LIDOCAINE 40 MG/G
CREAM TOPICAL
Status: DISCONTINUED | OUTPATIENT
Start: 2024-03-11 | End: 2024-03-11 | Stop reason: HOSPADM

## 2024-03-11 RX ORDER — FENTANYL CITRATE 50 UG/ML
50 INJECTION, SOLUTION INTRAMUSCULAR; INTRAVENOUS EVERY 5 MIN PRN
Status: DISCONTINUED | OUTPATIENT
Start: 2024-03-11 | End: 2024-03-11 | Stop reason: HOSPADM

## 2024-03-11 RX ORDER — OXYCODONE HYDROCHLORIDE 5 MG/1
10 TABLET ORAL
Status: DISCONTINUED | OUTPATIENT
Start: 2024-03-11 | End: 2024-03-11 | Stop reason: HOSPADM

## 2024-03-11 RX ORDER — ONDANSETRON 4 MG/1
4 TABLET, ORALLY DISINTEGRATING ORAL EVERY 30 MIN PRN
Status: DISCONTINUED | OUTPATIENT
Start: 2024-03-11 | End: 2024-03-11 | Stop reason: HOSPADM

## 2024-03-11 RX ORDER — SODIUM CHLORIDE, SODIUM LACTATE, POTASSIUM CHLORIDE, CALCIUM CHLORIDE 600; 310; 30; 20 MG/100ML; MG/100ML; MG/100ML; MG/100ML
INJECTION, SOLUTION INTRAVENOUS CONTINUOUS
Status: DISCONTINUED | OUTPATIENT
Start: 2024-03-11 | End: 2024-03-11 | Stop reason: HOSPADM

## 2024-03-11 RX ORDER — OXYCODONE HYDROCHLORIDE 5 MG/1
5 TABLET ORAL
Status: DISCONTINUED | OUTPATIENT
Start: 2024-03-11 | End: 2024-03-11 | Stop reason: HOSPADM

## 2024-03-11 RX ORDER — ONDANSETRON 2 MG/ML
INJECTION INTRAMUSCULAR; INTRAVENOUS PRN
Status: DISCONTINUED | OUTPATIENT
Start: 2024-03-11 | End: 2024-03-11

## 2024-03-11 RX ORDER — FENTANYL CITRATE 50 UG/ML
INJECTION, SOLUTION INTRAMUSCULAR; INTRAVENOUS PRN
Status: DISCONTINUED | OUTPATIENT
Start: 2024-03-11 | End: 2024-03-11

## 2024-03-11 RX ORDER — ACETAMINOPHEN 500 MG
500-1000 TABLET ORAL EVERY 6 HOURS PRN
Qty: 20 TABLET | Refills: 1 | Status: SHIPPED | OUTPATIENT
Start: 2024-03-11

## 2024-03-11 RX ORDER — CEFAZOLIN SODIUM/WATER 2 G/20 ML
2 SYRINGE (ML) INTRAVENOUS SEE ADMIN INSTRUCTIONS
Status: DISCONTINUED | OUTPATIENT
Start: 2024-03-11 | End: 2024-03-11 | Stop reason: HOSPADM

## 2024-03-11 RX ORDER — TRAMADOL HYDROCHLORIDE 50 MG/1
50 TABLET ORAL EVERY 6 HOURS PRN
Qty: 10 TABLET | Refills: 0 | Status: SHIPPED | OUTPATIENT
Start: 2024-03-11 | End: 2024-03-14

## 2024-03-11 RX ORDER — LIDOCAINE HYDROCHLORIDE 10 MG/ML
INJECTION, SOLUTION INFILTRATION; PERINEURAL PRN
Status: DISCONTINUED | OUTPATIENT
Start: 2024-03-11 | End: 2024-03-11

## 2024-03-11 RX ORDER — CEFAZOLIN SODIUM/WATER 2 G/20 ML
2 SYRINGE (ML) INTRAVENOUS
Status: COMPLETED | OUTPATIENT
Start: 2024-03-11 | End: 2024-03-11

## 2024-03-11 RX ADMIN — PHENYLEPHRINE HYDROCHLORIDE 100 MCG: 10 INJECTION INTRAVENOUS at 14:57

## 2024-03-11 RX ADMIN — FENTANYL CITRATE 25 MCG: 50 INJECTION INTRAMUSCULAR; INTRAVENOUS at 14:53

## 2024-03-11 RX ADMIN — KETAMINE HYDROCHLORIDE 40 MG: 10 INJECTION INTRAMUSCULAR; INTRAVENOUS at 14:57

## 2024-03-11 RX ADMIN — KETAMINE HYDROCHLORIDE 10 MG: 10 INJECTION INTRAMUSCULAR; INTRAVENOUS at 15:16

## 2024-03-11 RX ADMIN — SODIUM CHLORIDE, POTASSIUM CHLORIDE, SODIUM LACTATE AND CALCIUM CHLORIDE: 600; 310; 30; 20 INJECTION, SOLUTION INTRAVENOUS at 13:04

## 2024-03-11 RX ADMIN — Medication 2 G: at 14:48

## 2024-03-11 RX ADMIN — FENTANYL CITRATE 50 MCG: 50 INJECTION INTRAMUSCULAR; INTRAVENOUS at 14:57

## 2024-03-11 RX ADMIN — LIDOCAINE HYDROCHLORIDE 50 MG: 10 INJECTION, SOLUTION INFILTRATION; PERINEURAL at 14:57

## 2024-03-11 RX ADMIN — FENTANYL CITRATE 25 MCG: 50 INJECTION INTRAMUSCULAR; INTRAVENOUS at 14:49

## 2024-03-11 RX ADMIN — PHENYLEPHRINE HYDROCHLORIDE 100 MCG: 10 INJECTION INTRAVENOUS at 15:08

## 2024-03-11 RX ADMIN — PHENYLEPHRINE HYDROCHLORIDE 100 MCG: 10 INJECTION INTRAVENOUS at 15:05

## 2024-03-11 RX ADMIN — PROPOFOL 200 MG: 10 INJECTION, EMULSION INTRAVENOUS at 14:57

## 2024-03-11 RX ADMIN — ONDANSETRON 4 MG: 2 INJECTION INTRAMUSCULAR; INTRAVENOUS at 15:30

## 2024-03-11 RX ADMIN — PHENYLEPHRINE HYDROCHLORIDE 100 MCG: 10 INJECTION INTRAVENOUS at 15:13

## 2024-03-11 RX ADMIN — DEXAMETHASONE SODIUM PHOSPHATE 10 MG: 10 INJECTION, SOLUTION INTRAMUSCULAR; INTRAVENOUS at 14:57

## 2024-03-11 ASSESSMENT — ACTIVITIES OF DAILY LIVING (ADL)
ADLS_ACUITY_SCORE: 36
ADLS_ACUITY_SCORE: 34
ADLS_ACUITY_SCORE: 36

## 2024-03-11 ASSESSMENT — COPD QUESTIONNAIRES: COPD: 1

## 2024-03-11 NOTE — ANESTHESIA POSTPROCEDURE EVALUATION
Patient: Kelechi Kendrick    Procedure: Procedure(s):  CYSTOSCOPY, TRANSURETHRAL RESECTION BLADDER TUMOR       Anesthesia Type:  General    Note:  Disposition: Outpatient   Postop Pain Control: Uneventful            Sign Out: Well controlled pain   PONV: No   Neuro/Psych: Uneventful            Sign Out: Acceptable/Baseline neuro status   Airway/Respiratory: Uneventful            Sign Out: Acceptable/Baseline resp. status   CV/Hemodynamics: Uneventful            Sign Out: Acceptable CV status; No obvious hypovolemia; No obvious fluid overload   Other NRE: NONE   DID A NON-ROUTINE EVENT OCCUR?            Last vitals:  Vitals Value Taken Time   /65 03/11/24 1600   Temp 37.4  C (99.3  F) 03/11/24 1544   Pulse 108 03/11/24 1603   Resp 22 03/11/24 1603   SpO2 94 % 03/11/24 1603   Vitals shown include unfiled device data.    Electronically Signed By: Lucien Galan MD  March 11, 2024  4:05 PM   Complex Repair And Xenograft Text: The defect edges were debeveled with a #15 scalpel blade.  The primary defect was closed partially with a complex linear closure.  Given the location of the defect, shape of the defect and the proximity to free margins a xenograft was deemed most appropriate to repair the remaining defect.  The graft was trimmed to fit the size of the remaining defect.  The graft was then placed in the primary defect, oriented appropriately, and sutured into place.

## 2024-03-11 NOTE — ANESTHESIA CARE TRANSFER NOTE
Patient: Kelechi Kendrick    Procedure: Procedure(s):  CYSTOSCOPY, TRANSURETHRAL RESECTION BLADDER TUMOR       Diagnosis: Malignant neoplasm of bladder, unspecified (H) [C67.9]  Diagnosis Additional Information: No value filed.    Anesthesia Type:   General     Note:    Oropharynx: oropharynx clear of all foreign objects  Level of Consciousness: drowsy  Oxygen Supplementation: face mask  Level of Supplemental Oxygen (L/min / FiO2): 8  Independent Airway: airway patency satisfactory and stable  Dentition: dentition unchanged  Vital Signs Stable: post-procedure vital signs reviewed and stable  Report to RN Given: handoff report given  Patient transferred to: PACU    Handoff Report: Identifed the Patient, Identified the Reponsible Provider, Reviewed the pertinent medical history, Discussed the surgical course, Reviewed Intra-OP anesthesia mangement and issues during anesthesia, Set expectations for post-procedure period and Allowed opportunity for questions and acknowledgement of understanding      Vitals:  Vitals Value Taken Time   /87 03/11/24 1545   Temp     Pulse 107 03/11/24 1546   Resp 19 03/11/24 1546   SpO2 100 % 03/11/24 1546   Vitals shown include unfiled device data.    Electronically Signed By: DAYTON Das CRNA  March 11, 2024  3:48 PM

## 2024-03-11 NOTE — PHARMACY-ADMISSION MEDICATION HISTORY
Pharmacist Admission Medication History    Admission medication history is complete. The information provided in this note is only as accurate as the sources available at the time of the update.    Information Source(s): Patient via in-person    Pertinent Information:     Changes made to PTA medication list:  Added: None  Deleted: Albuterol HFA inhaler per patient request, Acetaminophen  Changed: None    Allergies reviewed with patient and updates made in EHR: yes    Medication History Completed By: SEBASTIAN OAKLEY RPH 3/11/2024 1:38 PM    PTA Med List   Medication Sig Last Dose    aspirin 81 MG EC tablet Take 1 tablet (81 mg) by mouth daily Past Week at pm    cyanocobalamin (VITAMIN B-12) 1000 MCG tablet Take 1,000 mcg by mouth daily Past Week    insulin detemir (LEVEMIR PEN) 100 UNIT/ML pen Inject 55 Units Subcutaneous at bedtime 3/10/2024 at took a lower dose of 40 units x1 yesterday due to procedure    lisinopril (PRINIVIL,ZESTRIL) 10 MG tablet [LISINOPRIL (PRINIVIL,ZESTRIL) 10 MG TABLET] Take 10 mg by mouth daily. 3/10/2024 at am    metFORMIN (GLUCOPHAGE) 500 MG tablet [METFORMIN (GLUCOPHAGE) 500 MG TABLET] Take 1,000 mg by mouth 2 (two) times a day with meals. 3/10/2024 at pm    pioglitazone (ACTOS) 15 MG tablet Take 15 mg by mouth daily 3/10/2024 at am    primidone (MYSOLINE) 50 MG tablet [PRIMIDONE (MYSOLINE) 50 MG TABLET] Take 50 mg by mouth at bedtime. 3/10/2024 at pm    simvastatin (ZOCOR) 40 MG tablet Take 40 mg by mouth every evening 3/10/2024 at pm    umeclidinium-vilanterol (ANORO ELLIPTA) 62.5-25 MCG/ACT oral inhaler INHALE 1 PUFF INTO THE LUNGS DAILY 3/10/2024 at pm

## 2024-03-11 NOTE — DISCHARGE INSTRUCTIONS
Follow up with [facility] in 3 days for catheter removal.  Please call [phone] to schedule an appointment.

## 2024-03-11 NOTE — INTERVAL H&P NOTE
"I have reviewed the surgical (or preoperative) H&P that is linked to this encounter, and examined the patient. There are no significant changes    Clinical Conditions Present on Arrival:  Clinically Significant Risk Factors Present on Admission        # Hyperkalemia: Highest K = 5.9 mmol/L in last 30 days, will monitor as appropriate  # Hyponatremia: Lowest Na = 133 mmol/L in last 30 days, will monitor as appropriate         # Drug Induced Platelet Defect: home medication list includes an antiplatelet medication  # Obesity: Estimated body mass index is 30.87 kg/m  as calculated from the following:    Height as of this encounter: 1.651 m (5' 5\").    Weight as of this encounter: 84.1 kg (185 lb 8 oz).       "

## 2024-03-11 NOTE — OP NOTE
Date of surgery: 3/11/2024    Place of Surgery:  [facility]    Surgeon: Dr. Derik Hernández    Anesthesia: General    Preoperative diagnosis:  History of bladder cancer    Postoperative diagnosis: same    Procedure: Cystoscopy, TURBT    Operative indication: The patient is a 78-year-old male with a history of high-grade bladder cancer.  He underwent cystoscopy and TURBT of a large tumor on the left lateral wall extending across the midline and to the right lateral wall.  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  He underwent BCG, last treatment was 12/28/2023.  He had several erythematous areas noted in the office and presents today for biopsy/treatment    Operative findings: There is erythema on the left lateral wall.  The previous changes noted on the posterior wall and inside of the diverticulum prior resolved.  Several biopsies were taken from the left lateral wall.  The thematous area is cauterized.    Operative procedure: The patient was brought to the operating room.  He was placed under general anesthesia.  He was placed in the dorsolithotomy position and prepped and draped in standard sterile fashion.  IV antibiotics were given for prophylaxis.    22 Cambodian cystoscope was inserted inserted through the urethra into the bladder.  The bladder was completely inspected.  The findings are noted above.  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  The bladder is moderately to severely trabeculated.    The only suspicious area is an area of flat erythema on the left lateral wall just lateral to the left trigone.  Several biopsies were taken using the flexible biopsy forceps.  There is a large prostate which bled easily.  The 24 Cambodian resectoscope was inserted to the urethra into the bladder.    Several additional biopsies were taken using the resectoscope.  The entire area was treated in case this comes back positive for CIS.  Care was taken to  avoid injury to the left ureteral orifice.  The patient's bladder was drained.  He tolerated the procedure well.  He was brought to the recovery room in stable condition    Drains: 20 Kosovan two-way catheter    Specimens: Bladder lesion    Estimated blood loss: 5cc    Complications: None    Derik Hernández MD

## 2024-03-11 NOTE — ANESTHESIA PROCEDURE NOTES
Airway       Patient location during procedure: OR  Staff -        Anesthesiologist:  Lucien Galan MD       CRNA: Siri Fernandez APRN CRNA       Performed By: CRNA and anesthesiologist  Consent for Airway        Urgency: elective  Indications and Patient Condition       Indications for airway management: juni-procedural       Induction type:intravenous       Mask difficulty assessment: 1 - vent by mask    Final Airway Details       Final airway type: supraglottic airway    Supraglottic Airway Details        Type: LMA       Brand: Ambu AuraGain       LMA size: 5    Post intubation assessment        Placement verified by: capnometry, equal breath sounds and chest rise        Number of attempts at approach: 1       Number of other approaches attempted: 0       Secured with: tape       Ease of procedure: easy       Dentition: Unchanged

## 2024-03-11 NOTE — ANESTHESIA PREPROCEDURE EVALUATION
Anesthesia Pre-Procedure Evaluation    Patient: Kelechi Kendrick   MRN: [id number] : 1945        Procedure : Procedure(s):  CYSTOSCOPY, TRANSURETHRAL RESECTION BLADDER TUMOR          Past Medical History:   Diagnosis Date    Bladder cancer (H)     COPD (chronic obstructive pulmonary disease) (H)     Diabetes mellitus, type II (H)     Dupuytren contracture     HLD (hyperlipidemia)     Hyperlipidemia     Hypertension     Personal history of malignant neoplasm of bladder     Plantar fasciitis     right    Tremor     Umbilical hernia with obstruction     Urinary tract infection       Past Surgical History:   Procedure Laterality Date    CYSTOSCOPY, TRANSURETHRAL RESECTION (TUR) TUMOR BLADDER, COMBINED N/A 10/10/2023    Procedure: CYSTOSCOPY, BIPOLAR TRANSURETHRAL RESECTION BLADDER TUMOR;  Surgeon: Derik Hernández MD;  [facility]    HERNIA REPAIR      LEFT TRIGGER THUMB SURGICAL RELEASE Left 2010    Dr. Reddy    ORTHOPEDIC SURGERY        Allergies   Allergen Reactions    Tdvax [Tetanus-Diphtheria Toxoids Td]      Other reaction(s): swelling    Tetanus Toxoid, Adsorbed [Tetanus Toxoid] Swelling    Naproxen Rash     Other reaction(s): rash, nausea      Social History     Tobacco Use    Smoking status: Former     Packs/day: 0.40     Years: 30.00     Additional pack years: 0.00     Total pack years: 12.00     Types: Cigarettes    Smokeless tobacco: Never   Substance Use Topics    Alcohol use: No      Wt Readings from Last 1 Encounters:   24 84.1 kg (185 lb 8 oz)        Anesthesia Evaluation   Pt has had prior anesthetic.     No history of anesthetic complications       ROS/MED HX  ENT/Pulmonary:     (+)                          COPD,              Neurologic:  - neg neurologic ROS     Cardiovascular:     (+) Dyslipidemia hypertension- -   -  - -                                      METS/Exercise Tolerance: >4 METS    Hematologic:  - neg hematologic  ROS     Musculoskeletal:  -  "neg musculoskeletal ROS     GI/Hepatic:  - neg GI/hepatic ROS     Renal/Genitourinary:  - neg Renal ROS     Endo:     (+)  type II DM,             Obesity,       Psychiatric/Substance Use:  - neg psychiatric ROS     Infectious Disease:  - neg infectious disease ROS     Malignancy:   (+) Malignancy, History of Other.    Other:  - neg other ROS          Physical Exam    Airway  airway exam normal      Mallampati: III   TM distance: > 3 FB   Neck ROM: full   Mouth opening: > 3 cm    Respiratory Devices and Support         Dental  no notable dental history     (+) Removable bridges or other hardware      Cardiovascular   cardiovascular exam normal          Pulmonary   pulmonary exam normal                OUTSIDE LABS:  CBC:   Lab Results   Component Value Date    WBC 8.2 05/17/2019    WBC 5.9 05/13/2019    HGB 15.5 07/09/2019    HGB 14.0 05/17/2019    HCT 42.9 05/17/2019    HCT 45.5 05/13/2019     05/17/2019     05/15/2019     BMP:   Lab Results   Component Value Date     (L) 03/07/2024     (L) 12/18/2023    POTASSIUM 5.9 (H) 03/07/2024    POTASSIUM 4.9 12/18/2023    CHLORIDE 97 (L) 03/07/2024    CHLORIDE 95 (L) 12/18/2023    CO2 27 03/07/2024    CO2 27 12/18/2023    BUN 15.0 03/07/2024    BUN 16.4 12/18/2023    CR 0.96 03/07/2024    CR 0.75 12/18/2023     (H) 03/11/2024     (H) 03/07/2024     COAGS:   Lab Results   Component Value Date    PTT 34 07/25/2018    INR 1.08 07/25/2018     POC: No results found for: \"BGM\", \"HCG\", \"HCGS\"  HEPATIC:   Lab Results   Component Value Date    ALBUMIN 4.5 03/07/2024    PROTTOTAL 7.6 03/07/2024    ALT 16 03/07/2024    AST 16 03/07/2024    ALKPHOS 87 03/07/2024    BILITOTAL 0.3 03/07/2024     OTHER:   Lab Results   Component Value Date    LACT 1.0 05/11/2019    A1C 8.7 (H) 05/11/2019    RODRIGUEZ 9.4 03/07/2024    MAG 2.1 05/17/2019    LIPASE 45 07/02/2023       Anesthesia Plan    ASA Status:  3    NPO Status:  NPO Appropriate    Anesthesia Type: " "General.     - Airway: LMA   Induction: Intravenous, Propofol.   Maintenance: Balanced.        Consents    Anesthesia Plan(s) and associated risks, benefits, and realistic alternatives discussed. Questions answered and patient/representative(s) expressed understanding.     - Discussed:     - Discussed with:  Patient, Other (See Comment)      - Extended Intubation/Ventilatory Support Discussed: No.      - Patient is DNR/DNI Status: No     Use of blood products discussed: No .     Postoperative Care    Pain management: IV analgesics, Multi-modal analgesia.     - Plan for long acting post-op opioid use   PONV prophylaxis: Ondansetron (or other 5HT-3), Dexamethasone or Solumedrol, Droperidol or Haldol     Comments:    Other Comments: 40 mg ketamine IV on induction.             Lucien Galan MD    I have reviewed the pertinent notes and labs in the chart from the past 30 days and (re)examined the patient.  Any updates or changes from those notes are reflected in this note.    # Hyperkalemia: Highest K = 5.9 mmol/L in last 30 days, will monitor as appropriate  # Hyponatremia: Lowest Na = 133 mmol/L in last 30 days, will monitor as appropriate         # Drug Induced Platelet Defect: home medication list includes an antiplatelet medication  # Obesity: Estimated body mass index is 30.87 kg/m  as calculated from the following:    Height as of this encounter: 1.651 m (5' 5\").    Weight as of this encounter: 84.1 kg (185 lb 8 oz).      "

## 2024-03-15 LAB
PATH REPORT.COMMENTS IMP SPEC: NORMAL
PATH REPORT.FINAL DX SPEC: NORMAL
PATH REPORT.GROSS SPEC: NORMAL
PATH REPORT.MICROSCOPIC SPEC OTHER STN: NORMAL
PATH REPORT.RELEVANT HX SPEC: NORMAL
PHOTO IMAGE: NORMAL

## 2024-03-15 PROCEDURE — 88305 TISSUE EXAM BY PATHOLOGIST: CPT | Mod: 26 | Performed by: PATHOLOGY

## 2024-03-15 PROCEDURE — 88341 IMHCHEM/IMCYTCHM EA ADD ANTB: CPT | Mod: 26 | Performed by: PATHOLOGY

## 2024-03-15 PROCEDURE — 88342 IMHCHEM/IMCYTCHM 1ST ANTB: CPT | Mod: 26 | Performed by: PATHOLOGY

## 2024-03-20 ENCOUNTER — OFFICE VISIT (OUTPATIENT)
Dept: PULMONOLOGY | Facility: CLINIC | Age: 79
End: 2024-03-20
Payer: MEDICARE

## 2024-03-20 VITALS
DIASTOLIC BLOOD PRESSURE: 64 MMHG | WEIGHT: 187 LBS | BODY MASS INDEX: 31.12 KG/M2 | OXYGEN SATURATION: 95 % | HEART RATE: 106 BPM | SYSTOLIC BLOOD PRESSURE: 118 MMHG

## 2024-03-20 DIAGNOSIS — J44.9 COPD, GROUP A, BY GOLD 2017 CLASSIFICATION (H): ICD-10-CM

## 2024-03-20 DIAGNOSIS — J43.9 PULMONARY EMPHYSEMA, UNSPECIFIED EMPHYSEMA TYPE (H): Primary | ICD-10-CM

## 2024-03-20 DIAGNOSIS — J44.9 CHRONIC OBSTRUCTIVE PULMONARY DISEASE, UNSPECIFIED COPD TYPE (H): ICD-10-CM

## 2024-03-20 PROCEDURE — 99213 OFFICE O/P EST LOW 20 MIN: CPT | Performed by: INTERNAL MEDICINE

## 2024-03-20 RX ORDER — UMECLIDINIUM BROMIDE AND VILANTEROL TRIFENATATE 62.5; 25 UG/1; UG/1
1 POWDER RESPIRATORY (INHALATION) DAILY
Qty: 60 EACH | Refills: 11 | Status: SHIPPED | OUTPATIENT
Start: 2024-03-20

## 2024-03-20 NOTE — PROGRESS NOTES
Pulmonary Clinic Follow-up Visit    Assessment:  78yoM with 80 pack year smoking history, GOLD A COPD here for follow up.         Plan:  Not eligible for screening--quit >15 years ago  Continue Anoro, refills for 1 year  Action plan in place, will call with exacerbation  Immunization for flu and COVID up to date. Requested he pursue RSV vaccination with a pharmacy.  Leg cramp source unclear but concerned about elevated potassium on pre-operative labs, unclear if they were repeated.       Swati Bravo MD  Pulmonary/Critical Care    ----------------------------    CCx: COPD    HPI: 78yoM with COPD and former smoker who was initially established after hospitalization for NURY pneumonia previously patient of Dr. Escobar here for follow up. Last seen 2/2023.    Had cystoscopy for bladder cancer. Negative biopsies last week which is great news.  Breathing at baseline, feels the Anoro helps. Biggest concern is leg cramping.       Current Regimen: Anoro  CAT: previously 7: 1+0+0+2+1+0+0+0=4    ROS:  12-point review performed and notable for  Shortness of breath and leg swelling. The remainder reviewed and negative.       Current Meds:  Current Outpatient Medications   Medication Sig Dispense Refill    acetaminophen (TYLENOL) 500 MG tablet Take 1-2 tablets (500-1,000 mg) by mouth every 6 hours as needed for mild pain 20 tablet 1    aspirin 81 MG EC tablet Take 1 tablet (81 mg) by mouth daily      cyanocobalamin (VITAMIN B-12) 1000 MCG tablet Take 1,000 mcg by mouth daily      insulin detemir (LEVEMIR PEN) 100 UNIT/ML pen Inject 55 Units Subcutaneous at bedtime      lisinopril (PRINIVIL,ZESTRIL) 10 MG tablet [LISINOPRIL (PRINIVIL,ZESTRIL) 10 MG TABLET] Take 10 mg by mouth daily.      metFORMIN (GLUCOPHAGE) 500 MG tablet [METFORMIN (GLUCOPHAGE) 500 MG TABLET] Take 1,000 mg by mouth 2 (two) times a day with meals.      pioglitazone (ACTOS) 15 MG tablet Take 15 mg by mouth daily      primidone (MYSOLINE) 50 MG tablet [PRIMIDONE  (MYSOLINE) 50 MG TABLET] Take 50 mg by mouth at bedtime.      simvastatin (ZOCOR) 40 MG tablet Take 40 mg by mouth every evening      umeclidinium-vilanterol (ANORO ELLIPTA) 62.5-25 MCG/ACT oral inhaler INHALE 1 PUFF INTO THE LUNGS DAILY 60 each 0       Physical Exam:  /64 (BP Location: Right arm, Patient Position: Chair, Cuff Size: Adult Regular)   Pulse 106   Wt 84.8 kg (187 lb)   SpO2 95%   BMI 31.12 kg/m    Gen: alert, oriented, no distress  HEENT: no lymphadenopathy  Neck: Trachea midline, No Accessory muscle use  CV: RRR, no M/G/R  Resp: rales at right base, clear on left, overall decreased air movement.   Abd: soft, nontender, no palpable organomegaly  Skin: no apparent rashes  Ext: no cyanosis, clubbing. Pitting edema bilaterally.   Neuro: alert, nonfocal    Labs:  CBC RESULTS:   Recent Labs   Lab Test 07/09/19  1428 05/17/19  0710   WBC  --  8.2   RBC  --  4.84   HGB 15.5 14.0   HCT  --  42.9   MCV  --  89   MCH  --  28.9   MCHC  --  32.6   RDW  --  12.5   PLT  --  194     Sodium   Date Value Ref Range Status   03/07/2024 133 (L) 135 - 145 mmol/L Final     Comment:     Reference intervals for this test were updated on 09/26/2023 to more accurately reflect our healthy population. There may be differences in the flagging of prior results with similar values performed with this method. Interpretation of those prior results can be made in the context of the updated reference intervals.    05/23/2019 140 133 - 144 mmol/L Final     Potassium   Date Value Ref Range Status   03/07/2024 5.9 (H) 3.4 - 5.3 mmol/L Final   12/10/2021 4.5 3.5 - 5.0 mmol/L Final   05/23/2019 4.3 3.4 - 5.3 mmol/L Final     Chloride   Date Value Ref Range Status   03/07/2024 97 (L) 98 - 107 mmol/L Final   12/10/2021 102 98 - 107 mmol/L Final   05/23/2019 107 94 - 109 mmol/L Final     Carbon Dioxide   Date Value Ref Range Status   05/23/2019 28 20 - 32 mmol/L Final     Carbon Dioxide (CO2)   Date Value Ref Range Status   03/07/2024  27 22 - 29 mmol/L Final   12/10/2021 28 22 - 31 mmol/L Final     Anion Gap   Date Value Ref Range Status   03/07/2024 9 7 - 15 mmol/L Final   12/10/2021 9 5 - 18 mmol/L Final   05/23/2019 5 3 - 14 mmol/L Final     Glucose   Date Value Ref Range Status   12/10/2021 172 (H) 70 - 125 mg/dL Final   05/23/2019 103 (H) 70 - 99 mg/dL Final     GLUCOSE BY METER POCT   Date Value Ref Range Status   03/11/2024 152 (H) 70 - 99 mg/dL Final     Urea Nitrogen   Date Value Ref Range Status   03/07/2024 15.0 8.0 - 23.0 mg/dL Final   12/10/2021 10 8 - 28 mg/dL Final   05/23/2019 22 7 - 30 mg/dL Final     Creatinine   Date Value Ref Range Status   03/07/2024 0.96 0.67 - 1.17 mg/dL Final   05/23/2019 0.80 0.66 - 1.25 mg/dL Final     GFR Estimate   Date Value Ref Range Status   03/07/2024 80 >60 mL/min/1.73m2 Final   11/27/2020 >60 >60 mL/min/1.73m2 Final   05/23/2019 88 >60 mL/min/[1.73_m2] Final     Comment:     Non  GFR Calc  Starting 12/18/2018, serum creatinine based estimated GFR (eGFR) will be   calculated using the Chronic Kidney Disease Epidemiology Collaboration   (CKD-EPI) equation.       Calcium   Date Value Ref Range Status   03/07/2024 9.4 8.8 - 10.2 mg/dL Final   05/23/2019 8.6 8.5 - 10.1 mg/dL Final         Imaging studies:  Personally reviewed image/s and Personally reviewed impression/s  EXAM: CT CHEST PE STUDY   LOCATION: [facility] MEDICAL IMAGING   DATE: 8/31/2023     INDICATION: Postoperative. Tachycardia. Leukocytosis. Pulmonary embolism (PE) suspected, high prob   COMPARISON: None.   TECHNIQUE: CT chest pulmonary angiogram during arterial phase injection of IV contrast. Multiplanar reformats and MIP reconstructions were performed. Dose reduction techniques were used.   CONTRAST: IOHEXOL 350 MG IODINE/ML  ML BOTTLE: 50mL     FINDINGS:   ANGIOGRAM CHEST: Pulmonary arteries are normal caliber and negative for pulmonary emboli. Thoracic aorta is negative for dissection. No CT evidence of right heart  strain.     LUNGS AND PLEURA: Mild bandlike scarring in the upper lungs and lingula. Mild mucous plugging and bronchial wall thickening in the lower lungs. No focal consolidation or focal groundglass opacity. No pleural effusion or pneumothorax. No suspicious pulmonary nodule or mass.     MEDIASTINUM/AXILLAE: No adenopathy. No pericardial effusion.     CORONARY ARTERY CALCIFICATION: Severe.     UPPER ABDOMEN: Small benign simple bilateral renal cysts, no follow-up needed.     MUSCULOSKELETAL: No suspicious osseous lesion. Mild scattered degenerative changes in the spine.   Impression    1. Negative for pulmonary embolism.  2. No evidence of pneumonia.  3. Mild bronchial thickening and mucous plugging in the lower lungs suggesting bronchitis.          PFT's  FEV1/FVC is 61 and is reduced.  FEV1 is 1.34 L (52% predicted) and is reduced.  FVC is 2.21 L (64% predicted) and reduced.  There was no improvement in spirometry after a single inhaled dose of bronchodilator.  TLC is 5.44 L (89% predicted) and is normal.  RV is 3.45 L (133% predicted) and is increased.  DLCO is 71 % predicted and is reduced when it was corrected for hemoglobin.  Impression: moderate obstruction without reversibility. Mildly reduced diffusion capacity

## 2024-03-20 NOTE — PATIENT INSTRUCTIONS
Try and get your RSV vaccine at the pharmacy when you can.   Please call Dr. Person's office to follow up the potassium from your labwork.   Please call my nurse Naomi [phone] if you have any breathing problems before follow up.

## 2024-03-20 NOTE — LETTER
3/20/2024         RE: Kelechi Kendrick  [address]  [facility] [address]        Dear Colleague,    Thank you for referring your patient, Kelechi Kendrick, to the [facility]. Please see a copy of my visit note below.    Kelechi had elevated potassium on preoperative labs so I asked him to reach back out to discuss as well as discuss leg cramping. He said magnesium hasn't helped in the past so I'm out of tricks. Thanks! Swati    Pulmonary Clinic Follow-up Visit    Assessment:  78yoM with 80 pack year smoking history, GOLD A COPD here for follow up.         Plan:  Not eligible for screening--quit >15 years ago  Continue Anoro, refills for 1 year  Action plan in place, will call with exacerbation  Immunization for flu and COVID up to date. Requested he pursue RSV vaccination with a pharmacy.  Leg cramp source unclear but concerned about elevated potassium on pre-operative labs, unclear if they were repeated.       Swati Bravo MD  Pulmonary/Critical Care    ----------------------------    CCx: COPD    HPI: 78yoM with COPD and former smoker who was initially established after hospitalization for NURY pneumonia previously patient of Dr. Escobar here for follow up. Last seen 2/2023.    Had cystoscopy for bladder cancer. Negative biopsies last week which is great news.  Breathing at baseline, feels the Anoro helps. Biggest concern is leg cramping.       Current Regimen: Anoro  CAT: previously 7: 1+0+0+2+1+0+0+0=4    ROS:  12-point review performed and notable for  Shortness of breath and leg swelling. The remainder reviewed and negative.       Current Meds:  Current Outpatient Medications   Medication Sig Dispense Refill     acetaminophen (TYLENOL) 500 MG tablet Take 1-2 tablets (500-1,000 mg) by mouth every 6 hours as needed for mild pain 20 tablet 1     aspirin 81 MG EC tablet Take 1 tablet (81 mg) by mouth daily       cyanocobalamin (VITAMIN B-12) 1000 MCG tablet Take  1,000 mcg by mouth daily       insulin detemir (LEVEMIR PEN) 100 UNIT/ML pen Inject 55 Units Subcutaneous at bedtime       lisinopril (PRINIVIL,ZESTRIL) 10 MG tablet [LISINOPRIL (PRINIVIL,ZESTRIL) 10 MG TABLET] Take 10 mg by mouth daily.       metFORMIN (GLUCOPHAGE) 500 MG tablet [METFORMIN (GLUCOPHAGE) 500 MG TABLET] Take 1,000 mg by mouth 2 (two) times a day with meals.       pioglitazone (ACTOS) 15 MG tablet Take 15 mg by mouth daily       primidone (MYSOLINE) 50 MG tablet [PRIMIDONE (MYSOLINE) 50 MG TABLET] Take 50 mg by mouth at bedtime.       simvastatin (ZOCOR) 40 MG tablet Take 40 mg by mouth every evening       umeclidinium-vilanterol (ANORO ELLIPTA) 62.5-25 MCG/ACT oral inhaler INHALE 1 PUFF INTO THE LUNGS DAILY 60 each 0       Physical Exam:  /64 (BP Location: Right arm, Patient Position: Chair, Cuff Size: Adult Regular)   Pulse 106   Wt 84.8 kg (187 lb)   SpO2 95%   BMI 31.12 kg/m    Gen: alert, oriented, no distress  HEENT: no lymphadenopathy  Neck: Trachea midline, No Accessory muscle use  CV: RRR, no M/G/R  Resp: rales at right base, clear on left, overall decreased air movement.   Abd: soft, nontender, no palpable organomegaly  Skin: no apparent rashes  Ext: no cyanosis, clubbing. Pitting edema bilaterally.   Neuro: alert, nonfocal    Labs:  CBC RESULTS:   Recent Labs   Lab Test 07/09/19  1428 05/17/19  0710   WBC  --  8.2   RBC  --  4.84   HGB 15.5 14.0   HCT  --  42.9   MCV  --  89   MCH  --  28.9   MCHC  --  32.6   RDW  --  12.5   PLT  --  194     Sodium   Date Value Ref Range Status   03/07/2024 133 (L) 135 - 145 mmol/L Final     Comment:     Reference intervals for this test were updated on 09/26/2023 to more accurately reflect our healthy population. There may be differences in the flagging of prior results with similar values performed with this method. Interpretation of those prior results can be made in the context of the updated reference intervals.    05/23/2019 140 133 - 144  mmol/L Final     Potassium   Date Value Ref Range Status   03/07/2024 5.9 (H) 3.4 - 5.3 mmol/L Final   12/10/2021 4.5 3.5 - 5.0 mmol/L Final   05/23/2019 4.3 3.4 - 5.3 mmol/L Final     Chloride   Date Value Ref Range Status   03/07/2024 97 (L) 98 - 107 mmol/L Final   12/10/2021 102 98 - 107 mmol/L Final   05/23/2019 107 94 - 109 mmol/L Final     Carbon Dioxide   Date Value Ref Range Status   05/23/2019 28 20 - 32 mmol/L Final     Carbon Dioxide (CO2)   Date Value Ref Range Status   03/07/2024 27 22 - 29 mmol/L Final   12/10/2021 28 22 - 31 mmol/L Final     Anion Gap   Date Value Ref Range Status   03/07/2024 9 7 - 15 mmol/L Final   12/10/2021 9 5 - 18 mmol/L Final   05/23/2019 5 3 - 14 mmol/L Final     Glucose   Date Value Ref Range Status   12/10/2021 172 (H) 70 - 125 mg/dL Final   05/23/2019 103 (H) 70 - 99 mg/dL Final     GLUCOSE BY METER POCT   Date Value Ref Range Status   03/11/2024 152 (H) 70 - 99 mg/dL Final     Urea Nitrogen   Date Value Ref Range Status   03/07/2024 15.0 8.0 - 23.0 mg/dL Final   12/10/2021 10 8 - 28 mg/dL Final   05/23/2019 22 7 - 30 mg/dL Final     Creatinine   Date Value Ref Range Status   03/07/2024 0.96 0.67 - 1.17 mg/dL Final   05/23/2019 0.80 0.66 - 1.25 mg/dL Final     GFR Estimate   Date Value Ref Range Status   03/07/2024 80 >60 mL/min/1.73m2 Final   11/27/2020 >60 >60 mL/min/1.73m2 Final   05/23/2019 88 >60 mL/min/[1.73_m2] Final     Comment:     Non  GFR Calc  Starting 12/18/2018, serum creatinine based estimated GFR (eGFR) will be   calculated using the Chronic Kidney Disease Epidemiology Collaboration   (CKD-EPI) equation.       Calcium   Date Value Ref Range Status   03/07/2024 9.4 8.8 - 10.2 mg/dL Final   05/23/2019 8.6 8.5 - 10.1 mg/dL Final         Imaging studies:  Personally reviewed image/s and Personally reviewed impression/s  EXAM: CT CHEST PE STUDY   LOCATION: [facility] MEDICAL IMAGING   DATE: 8/31/2023     INDICATION: Postoperative. Tachycardia.  Leukocytosis. Pulmonary embolism (PE) suspected, high prob   COMPARISON: None.   TECHNIQUE: CT chest pulmonary angiogram during arterial phase injection of IV contrast. Multiplanar reformats and MIP reconstructions were performed. Dose reduction techniques were used.   CONTRAST: IOHEXOL 350 MG IODINE/ML  ML BOTTLE: 50mL     FINDINGS:   ANGIOGRAM CHEST: Pulmonary arteries are normal caliber and negative for pulmonary emboli. Thoracic aorta is negative for dissection. No CT evidence of right heart strain.     LUNGS AND PLEURA: Mild bandlike scarring in the upper lungs and lingula. Mild mucous plugging and bronchial wall thickening in the lower lungs. No focal consolidation or focal groundglass opacity. No pleural effusion or pneumothorax. No suspicious pulmonary nodule or mass.     MEDIASTINUM/AXILLAE: No adenopathy. No pericardial effusion.     CORONARY ARTERY CALCIFICATION: Severe.     UPPER ABDOMEN: Small benign simple bilateral renal cysts, no follow-up needed.     MUSCULOSKELETAL: No suspicious osseous lesion. Mild scattered degenerative changes in the spine.   Impression    1. Negative for pulmonary embolism.  2. No evidence of pneumonia.  3. Mild bronchial thickening and mucous plugging in the lower lungs suggesting bronchitis.          PFT's  FEV1/FVC is 61 and is reduced.  FEV1 is 1.34 L (52% predicted) and is reduced.  FVC is 2.21 L (64% predicted) and reduced.  There was no improvement in spirometry after a single inhaled dose of bronchodilator.  TLC is 5.44 L (89% predicted) and is normal.  RV is 3.45 L (133% predicted) and is increased.  DLCO is 71 % predicted and is reduced when it was corrected for hemoglobin.  Impression: moderate obstruction without reversibility. Mildly reduced diffusion capacity           Again, thank you for allowing me to participate in the care of your patient.        Sincerely,        Swati Bravo MD

## 2024-03-28 ENCOUNTER — LAB REQUISITION (OUTPATIENT)
Dept: LAB | Facility: CLINIC | Age: 79
End: 2024-03-28
Payer: MEDICARE

## 2024-03-28 DIAGNOSIS — E87.1 HYPO-OSMOLALITY AND HYPONATREMIA: ICD-10-CM

## 2024-03-28 PROCEDURE — 80053 COMPREHEN METABOLIC PANEL: CPT | Mod: ORL | Performed by: STUDENT IN AN ORGANIZED HEALTH CARE EDUCATION/TRAINING PROGRAM

## 2024-03-29 LAB
ALBUMIN SERPL BCG-MCNC: 4.2 G/DL (ref 3.5–5.2)
ALP SERPL-CCNC: 84 U/L (ref 40–150)
ALT SERPL W P-5'-P-CCNC: 14 U/L (ref 0–70)
ANION GAP SERPL CALCULATED.3IONS-SCNC: 12 MMOL/L (ref 7–15)
AST SERPL W P-5'-P-CCNC: 13 U/L (ref 0–45)
BILIRUB SERPL-MCNC: 0.3 MG/DL
BUN SERPL-MCNC: 14.8 MG/DL (ref 8–23)
CALCIUM SERPL-MCNC: 9.1 MG/DL (ref 8.8–10.2)
CHLORIDE SERPL-SCNC: 96 MMOL/L (ref 98–107)
CREAT SERPL-MCNC: 0.84 MG/DL (ref 0.67–1.17)
DEPRECATED HCO3 PLAS-SCNC: 26 MMOL/L (ref 22–29)
EGFRCR SERPLBLD CKD-EPI 2021: 89 ML/MIN/1.73M2
GLUCOSE SERPL-MCNC: 340 MG/DL (ref 70–99)
POTASSIUM SERPL-SCNC: 5.2 MMOL/L (ref 3.4–5.3)
PROT SERPL-MCNC: 7.2 G/DL (ref 6.4–8.3)
SODIUM SERPL-SCNC: 134 MMOL/L (ref 135–145)

## 2024-04-24 ENCOUNTER — LAB REQUISITION (OUTPATIENT)
Dept: LAB | Facility: CLINIC | Age: 79
End: 2024-04-24
Payer: MEDICARE

## 2024-04-24 DIAGNOSIS — R25.2 CRAMP AND SPASM: ICD-10-CM

## 2024-04-24 PROCEDURE — 83540 ASSAY OF IRON: CPT | Mod: ORL | Performed by: FAMILY MEDICINE

## 2024-04-24 PROCEDURE — 84443 ASSAY THYROID STIM HORMONE: CPT | Mod: ORL | Performed by: FAMILY MEDICINE

## 2024-04-24 PROCEDURE — 83735 ASSAY OF MAGNESIUM: CPT | Mod: ORL | Performed by: FAMILY MEDICINE

## 2024-04-25 LAB
IRON SERPL-MCNC: 83 UG/DL (ref 61–157)
MAGNESIUM SERPL-MCNC: 2 MG/DL (ref 1.7–2.3)
TSH SERPL DL<=0.005 MIU/L-ACNC: 1.9 UIU/ML (ref 0.3–4.2)

## 2024-06-27 ENCOUNTER — LAB REQUISITION (OUTPATIENT)
Dept: LAB | Facility: CLINIC | Age: 79
End: 2024-06-27
Payer: MEDICARE

## 2024-06-27 DIAGNOSIS — I10 ESSENTIAL (PRIMARY) HYPERTENSION: ICD-10-CM

## 2024-06-27 DIAGNOSIS — E78.2 MIXED HYPERLIPIDEMIA: ICD-10-CM

## 2024-06-27 PROCEDURE — 80061 LIPID PANEL: CPT | Mod: ORL | Performed by: FAMILY MEDICINE

## 2024-06-27 PROCEDURE — 80053 COMPREHEN METABOLIC PANEL: CPT | Mod: ORL | Performed by: FAMILY MEDICINE

## 2024-06-28 LAB
ALBUMIN SERPL BCG-MCNC: 4.3 G/DL (ref 3.5–5.2)
ALP SERPL-CCNC: 72 U/L (ref 40–150)
ALT SERPL W P-5'-P-CCNC: 17 U/L (ref 0–70)
ANION GAP SERPL CALCULATED.3IONS-SCNC: 11 MMOL/L (ref 7–15)
AST SERPL W P-5'-P-CCNC: 18 U/L (ref 0–45)
BILIRUB SERPL-MCNC: 0.3 MG/DL
BUN SERPL-MCNC: 17.4 MG/DL (ref 8–23)
CALCIUM SERPL-MCNC: 9.2 MG/DL (ref 8.8–10.2)
CHLORIDE SERPL-SCNC: 102 MMOL/L (ref 98–107)
CHOLEST SERPL-MCNC: 136 MG/DL
CREAT SERPL-MCNC: 0.79 MG/DL (ref 0.67–1.17)
DEPRECATED HCO3 PLAS-SCNC: 28 MMOL/L (ref 22–29)
EGFRCR SERPLBLD CKD-EPI 2021: 90 ML/MIN/1.73M2
FASTING STATUS PATIENT QL REPORTED: ABNORMAL
FASTING STATUS PATIENT QL REPORTED: NORMAL
GLUCOSE SERPL-MCNC: 134 MG/DL (ref 70–99)
HDLC SERPL-MCNC: 44 MG/DL
LDLC SERPL CALC-MCNC: 73 MG/DL
NONHDLC SERPL-MCNC: 92 MG/DL
POTASSIUM SERPL-SCNC: 4.7 MMOL/L (ref 3.4–5.3)
PROT SERPL-MCNC: 7.4 G/DL (ref 6.4–8.3)
SODIUM SERPL-SCNC: 141 MMOL/L (ref 135–145)
TRIGL SERPL-MCNC: 94 MG/DL

## 2024-08-14 ENCOUNTER — HOSPITAL ENCOUNTER (EMERGENCY)
Facility: HOSPITAL | Age: 79
Discharge: HOME | End: 2024-08-14
Attending: EMERGENCY MEDICINE | Admitting: EMERGENCY MEDICINE
Payer: MEDICARE

## 2024-08-14 ENCOUNTER — APPOINTMENT (OUTPATIENT)
Dept: RADIOLOGY | Facility: HOSPITAL | Age: 79
End: 2024-08-14
Attending: EMERGENCY MEDICINE
Payer: MEDICARE

## 2024-08-14 VITALS
SYSTOLIC BLOOD PRESSURE: 169 MMHG | HEIGHT: 65 IN | DIASTOLIC BLOOD PRESSURE: 72 MMHG | WEIGHT: 180 LBS | OXYGEN SATURATION: 94 % | TEMPERATURE: 98.2 F | BODY MASS INDEX: 29.99 KG/M2 | HEART RATE: 98 BPM | RESPIRATION RATE: 18 BRPM

## 2024-08-14 DIAGNOSIS — R05.1 ACUTE COUGH: ICD-10-CM

## 2024-08-14 DIAGNOSIS — R53.1 GENERAL WEAKNESS: ICD-10-CM

## 2024-08-14 PROBLEM — E78.2 MIXED HYPERLIPIDEMIA: Status: ACTIVE | Noted: 2023-08-30

## 2024-08-14 PROBLEM — F17.211 NICOTINE DEPENDENCE, CIGARETTES, IN REMISSION: Status: ACTIVE | Noted: 2024-08-14

## 2024-08-14 LAB
ALBUMIN UR-MCNC: NEGATIVE MG/DL
ANION GAP SERPL CALCULATED.3IONS-SCNC: 13 MMOL/L (ref 7–15)
APPEARANCE UR: CLEAR
BASOPHILS # BLD AUTO: 0.1 10E3/UL (ref 0–0.2)
BASOPHILS NFR BLD AUTO: 1 %
BILIRUB UR QL STRIP: NEGATIVE
BUN SERPL-MCNC: 17.6 MG/DL (ref 8–23)
CALCIUM SERPL-MCNC: 8.5 MG/DL (ref 8.8–10.4)
CHLORIDE SERPL-SCNC: 100 MMOL/L (ref 98–107)
COLOR UR AUTO: ABNORMAL
CREAT SERPL-MCNC: 0.7 MG/DL (ref 0.67–1.17)
CRP SERPL-MCNC: 4.5 MG/L
EGFRCR SERPLBLD CKD-EPI 2021: >90 ML/MIN/1.73M2
EOSINOPHIL # BLD AUTO: 1.7 10E3/UL (ref 0–0.7)
EOSINOPHIL NFR BLD AUTO: 19 %
ERYTHROCYTE [DISTWIDTH] IN BLOOD BY AUTOMATED COUNT: 13.6 % (ref 10–15)
GLUCOSE SERPL-MCNC: 111 MG/DL (ref 70–99)
GLUCOSE UR STRIP-MCNC: >1000 MG/DL
HCO3 SERPL-SCNC: 23 MMOL/L (ref 22–29)
HCT VFR BLD AUTO: 47.1 % (ref 40–53)
HGB BLD-MCNC: 14.9 G/DL (ref 13.3–17.7)
HGB UR QL STRIP: NEGATIVE
HOLD SPECIMEN: NORMAL
IMM GRANULOCYTES # BLD: 0 10E3/UL
IMM GRANULOCYTES NFR BLD: 0 %
KETONES UR STRIP-MCNC: NEGATIVE MG/DL
LEUKOCYTE ESTERASE UR QL STRIP: NEGATIVE
LYMPHOCYTES # BLD AUTO: 2 10E3/UL (ref 0.8–5.3)
LYMPHOCYTES NFR BLD AUTO: 22 %
MCH RBC QN AUTO: 29.4 PG (ref 26.5–33)
MCHC RBC AUTO-ENTMCNC: 31.6 G/DL (ref 31.5–36.5)
MCV RBC AUTO: 93 FL (ref 78–100)
MONOCYTES # BLD AUTO: 0.4 10E3/UL (ref 0–1.3)
MONOCYTES NFR BLD AUTO: 4 %
NEUTROPHILS # BLD AUTO: 4.8 10E3/UL (ref 1.6–8.3)
NEUTROPHILS NFR BLD AUTO: 53 %
NITRATE UR QL: NEGATIVE
NRBC # BLD AUTO: 0 10E3/UL
NRBC BLD AUTO-RTO: 0 /100
PH UR STRIP: 6 [PH] (ref 5–7)
PLATELET # BLD AUTO: 167 10E3/UL (ref 150–450)
POTASSIUM SERPL-SCNC: 4.8 MMOL/L (ref 3.4–5.3)
RBC # BLD AUTO: 5.07 10E6/UL (ref 4.4–5.9)
RBC URINE: 1 /HPF
SARS-COV-2 RNA RESP QL NAA+PROBE: NEGATIVE
SODIUM SERPL-SCNC: 136 MMOL/L (ref 135–145)
SP GR UR STRIP: 1.01 (ref 1–1.03)
TROPONIN T SERPL HS-MCNC: 10 NG/L
UROBILINOGEN UR STRIP-MCNC: <2 MG/DL
WBC # BLD AUTO: 8.9 10E3/UL (ref 4–11)
WBC URINE: <1 /HPF

## 2024-08-14 PROCEDURE — 86140 C-REACTIVE PROTEIN: CPT | Performed by: EMERGENCY MEDICINE

## 2024-08-14 PROCEDURE — 80048 BASIC METABOLIC PNL TOTAL CA: CPT | Performed by: EMERGENCY MEDICINE

## 2024-08-14 PROCEDURE — 93005 ELECTROCARDIOGRAM TRACING: CPT | Performed by: EMERGENCY MEDICINE

## 2024-08-14 PROCEDURE — 84484 ASSAY OF TROPONIN QUANT: CPT | Performed by: STUDENT IN AN ORGANIZED HEALTH CARE EDUCATION/TRAINING PROGRAM

## 2024-08-14 PROCEDURE — 87635 SARS-COV-2 COVID-19 AMP PRB: CPT | Performed by: EMERGENCY MEDICINE

## 2024-08-14 PROCEDURE — 71046 X-RAY EXAM CHEST 2 VIEWS: CPT

## 2024-08-14 PROCEDURE — 81001 URINALYSIS AUTO W/SCOPE: CPT | Performed by: EMERGENCY MEDICINE

## 2024-08-14 PROCEDURE — 36415 COLL VENOUS BLD VENIPUNCTURE: CPT | Performed by: STUDENT IN AN ORGANIZED HEALTH CARE EDUCATION/TRAINING PROGRAM

## 2024-08-14 PROCEDURE — 81001 URINALYSIS AUTO W/SCOPE: CPT | Performed by: STUDENT IN AN ORGANIZED HEALTH CARE EDUCATION/TRAINING PROGRAM

## 2024-08-14 PROCEDURE — 84484 ASSAY OF TROPONIN QUANT: CPT | Performed by: EMERGENCY MEDICINE

## 2024-08-14 PROCEDURE — 99285 EMERGENCY DEPT VISIT HI MDM: CPT | Mod: 25

## 2024-08-14 PROCEDURE — 80048 BASIC METABOLIC PNL TOTAL CA: CPT | Performed by: STUDENT IN AN ORGANIZED HEALTH CARE EDUCATION/TRAINING PROGRAM

## 2024-08-14 PROCEDURE — 87635 SARS-COV-2 COVID-19 AMP PRB: CPT | Performed by: STUDENT IN AN ORGANIZED HEALTH CARE EDUCATION/TRAINING PROGRAM

## 2024-08-14 PROCEDURE — 93005 ELECTROCARDIOGRAM TRACING: CPT | Performed by: STUDENT IN AN ORGANIZED HEALTH CARE EDUCATION/TRAINING PROGRAM

## 2024-08-14 PROCEDURE — 85025 COMPLETE CBC W/AUTO DIFF WBC: CPT | Performed by: EMERGENCY MEDICINE

## 2024-08-14 RX ORDER — GUAIFENESIN 600 MG/1
1200 TABLET, EXTENDED RELEASE ORAL 2 TIMES DAILY
Qty: 20 TABLET | Refills: 0 | Status: SHIPPED | OUTPATIENT
Start: 2024-08-14 | End: 2024-09-16

## 2024-08-14 ASSESSMENT — COLUMBIA-SUICIDE SEVERITY RATING SCALE - C-SSRS
2. HAVE YOU ACTUALLY HAD ANY THOUGHTS OF KILLING YOURSELF IN THE PAST MONTH?: NO
6. HAVE YOU EVER DONE ANYTHING, STARTED TO DO ANYTHING, OR PREPARED TO DO ANYTHING TO END YOUR LIFE?: NO
1. IN THE PAST MONTH, HAVE YOU WISHED YOU WERE DEAD OR WISHED YOU COULD GO TO SLEEP AND NOT WAKE UP?: NO

## 2024-08-14 ASSESSMENT — ACTIVITIES OF DAILY LIVING (ADL)
ADLS_ACUITY_SCORE: 36
ADLS_ACUITY_SCORE: 36
ADLS_ACUITY_SCORE: 34

## 2024-08-14 NOTE — DISCHARGE INSTRUCTIONS
Your chest x-ray, EKG, and laboratory test all appear reassuring.  Take the prescribed Mucinex as directed to help with the cough.  It is recommended to get plenty rest and stay well-hydrated over the next few days.  Follow-up with your primary care provider for reevaluation as needed or return back to ED sooner for any worsening respiratory difficulty or any other new or concerning symptoms.

## 2024-08-14 NOTE — ED PROVIDER NOTES
EMERGENCY DEPARTMENT ENCOUNTER      NAME: Kelechi Kendrick  AGE: 79 year old male  YOB: 1945  MRN: [id number]  EVALUATION DATE & TIME: 2024 11:46 AM    PCP: Ramiro Person    ED PROVIDER: Josef Galdamez DO      Chief Complaint   Patient presents with    Generalized Weakness         FINAL IMPRESSION:  1. General weakness    2. Acute cough          ED COURSE & MEDICAL DECISION MAKIN year old male presented to the ED for evaluation of generalized weakness and a cough and been ongoing for the last few days.  Upon arrival to the ED the patient was noted to be slightly hypertensive.  He was otherwise hemodynamic stable.  The patient did not appear to be in any obvious distress or discomfort and he was not ill-appearing.  The patient was noted to have faint crackles in the left lung.  Remainder of the physical exam was unremarkable.    An EKG was obtained which reveals normal sinus rhythm without any concerning ST or T wave changes.     CBC, CMP, COVID testing, UA, CRP, and troponin were all reassuring or negative.  Chest x-ray was nondiagnostic.      The patient was reevaluated and both he and his family were were informed of the reassuring lab and a chest x-ray results.  The patient was informed that his cough may related to a viral etiology and that his worsening fatigue today is secondary to the fact that he was up all night unable to sleep after taking a decongestant which contain Sudafed.  After educating and reassure the patient and his member they both felt comfortable returning home.  The patient was sent home with Mucinex to treat his cough.  The patient was instructed to follow-up with his primary care provider for reevaluation or to return back to ED sooner for any worsening respiratory difficulty, fatigue, or any other new or concerning symptoms    Pertinent Labs & Imaging studies reviewed. (See chart for details)    1:18 PM I met with the patient to gather history and to  perform my initial exam. We discussed plans for the ED course, including diagnostic testing and treatment.       At the conclusion of the encounter I discussed the results of all of the tests and the disposition. The questions were answered. The patient or family acknowledged understanding and was agreeable with the care plan.     Medical Decision Making    History:  Supplemental history from: Documented in chart and Family Member/Significant Other  External Record(s) reviewed: Documented in chart    Work Up:  Chart documentation includes differential considered and any EKGs or imaging independently interpreted by provider, where specified.  In additional to work up documented, I considered the following work up: Documented in chart, if applicable.    External consultation:  Discussion of management with another provider: Documented in chart, if applicable    Complicating factors:  Care impacted by chronic illness: Chronic Lung Disease, Diabetes, Hyperlipidemia, and Hypertension  Care affected by social determinants of health: N/A    Disposition considerations: Discharge. I prescribed additional prescription strength medication(s) as charted. See documentation for any additional details.        PPE worn: n95 mask, goggles    MEDICATIONS GIVEN IN THE EMERGENCY:  Medications - No data to display    NEW PRESCRIPTIONS STARTED AT TODAY'S ER VISIT  Discharge Medication List as of 8/14/2024  2:38 PM        START taking these medications    Details   guaiFENesin (MUCINEX) 600 MG 12 hr tablet Take 2 tablets (1,200 mg) by mouth 2 times daily, Disp-20 tablet, R-0, E-Prescribe                =================================================================    HPI    Patient information was obtained from: Patient and daughter    Use of : N/A         Kelechi Kendrick is a 79 year old male with a pertinent history of hypertension, hyperlipidemia, T2DM, COPD, former tobacco use, bladder cancer, who presents to this  "ED via walk-in for evaluation of generalized weakness.    The patient reports that last Friday (8/9/2024, 5 days ago), he developed some \"raspiness\" that \"settled in his lungs\" as well as an associated cough and shortness of breath.  He notes his shortness of breath is exertional and is not present when he lies down.  Patient says that he felt very weak this morning and that he thought he may fall while he was going to the bathroom.  His daughter mentions that the patient has had pneumonia multiple times before and experienced similar symptoms of generalized weakness with this.    Patient also mentions that he took some congestion medications, which his daughter believes contain pseudoephedrine, and he did not sleep well last night.      REVIEW OF SYSTEMS   As per HPI.    PAST MEDICAL HISTORY:  Past Medical History:   Diagnosis Date    Bladder cancer (H)     COPD (chronic obstructive pulmonary disease) (H)     Diabetes mellitus, type II (H)     Dupuytren contracture     HLD (hyperlipidemia)     Hyperlipidemia     Hypertension     Personal history of malignant neoplasm of bladder     Plantar fasciitis     right    Tremor     Umbilical hernia with obstruction     Urinary tract infection        PAST SURGICAL HISTORY:  Past Surgical History:   Procedure Laterality Date    CYSTOSCOPY, TRANSURETHRAL RESECTION (TUR) TUMOR BLADDER, COMBINED N/A 10/10/2023    Procedure: CYSTOSCOPY, BIPOLAR TRANSURETHRAL RESECTION BLADDER TUMOR;  Surgeon: Derik Hernández MD;  [place] [facility] [place]    CYSTOSCOPY, TRANSURETHRAL RESECTION (TUR) TUMOR BLADDER, COMBINED N/A 3/11/2024    Procedure: CYSTOSCOPY, TRANSURETHRAL RESECTION BLADDER TUMOR;  Surgeon: Derik Hernández MD;  [place] [facility] [place]    HERNIA REPAIR      LEFT TRIGGER THUMB SURGICAL RELEASE Left 08/24/2010    Dr. Reddy    ORTHOPEDIC SURGERY             CURRENT MEDICATIONS:    guaiFENesin (MUCINEX) 600 MG 12 hr tablet  acetaminophen (TYLENOL) 500 MG " "tablet  aspirin 81 MG EC tablet  cyanocobalamin (VITAMIN B-12) 1000 MCG tablet  insulin detemir (LEVEMIR PEN) 100 UNIT/ML pen  lisinopril (PRINIVIL,ZESTRIL) 10 MG tablet  metFORMIN (GLUCOPHAGE) 500 MG tablet  pioglitazone (ACTOS) 15 MG tablet  primidone (MYSOLINE) 50 MG tablet  simvastatin (ZOCOR) 40 MG tablet  umeclidinium-vilanterol (ANORO ELLIPTA) 62.5-25 MCG/ACT oral inhaler        ALLERGIES:  Allergies   Allergen Reactions    Tdvax [Tetanus-Diphtheria Toxoids Td]      Other reaction(s): swelling    Tetanus Toxoid, Adsorbed [Tetanus Toxoid] Swelling    Naproxen Rash     Other reaction(s): rash, nausea       FAMILY HISTORY:  No family history on file.    SOCIAL HISTORY:   Social History     Socioeconomic History    Marital status:    Tobacco Use    Smoking status: Former     Current packs/day: 0.40     Average packs/day: 0.4 packs/day for 30.0 years (12.0 ttl pk-yrs)     Types: Cigarettes    Smokeless tobacco: Never   Substance and Sexual Activity    Alcohol use: No    Drug use: No     Social Determinants of Health      [facility]    Social Connections       VITALS:  BP (!) 169/72   Pulse 98   Temp 98.2  F (36.8  C) (Temporal)   Resp 18   Ht 1.651 m (5' 5\")   Wt 81.6 kg (180 lb)   SpO2 94%   BMI 29.95 kg/m      PHYSICAL EXAM    General presentation: Alert, Vital signs reviewed. NAD. Mildly fatigued appearing.  HENT: ENT inspection is normal. Oropharynx is moist and clear.   Eye: Pupils are equal and reactive to light. EOMI  Neck: The neck is supple, with full ROM, with no evidence of meningismus.  Pulmonary: Currently in no acute respiratory distress. Normal, non labored respirations. Crackles noted on left side.  Circulatory: Regular rate and rhythm. Peripheral pulses are strong and equal. No murmurs, rubs, or gallops.   Abdominal: The abdomen is soft. Nontender. No rigidity, guarding, or rebound. Bowel " sounds normal.   Neurologic: Alert, oriented to person, place, and time. No motor deficit. No sensory deficit. Cranial nerves II through XII are intact.  Musculoskeletal: No extremity tenderness. Full range of motion in all extremities. No extremity edema.   Skin: Skin color is normal. No rash. Warm. Dry to touch.      LAB:  All pertinent labs reviewed and interpreted.  Results for orders placed or performed during the hospital encounter of 08/14/24   Chest XR,  PA & LAT    Impression    IMPRESSION: No focal airspace opacity. No pleural effusion or pneumothorax. Cardiac silhouette and mediastinal contours are stable.   Basic metabolic panel   Result Value Ref Range    Sodium 136 135 - 145 mmol/L    Potassium 4.8 3.4 - 5.3 mmol/L    Chloride 100 98 - 107 mmol/L    Carbon Dioxide (CO2) 23 22 - 29 mmol/L    Anion Gap 13 7 - 15 mmol/L    Urea Nitrogen 17.6 8.0 - 23.0 mg/dL    Creatinine 0.70 0.67 - 1.17 mg/dL    GFR Estimate >90 >60 mL/min/1.73m2    Calcium 8.5 (L) 8.8 - 10.4 mg/dL    Glucose 111 (H) 70 - 99 mg/dL   Result Value Ref Range    Troponin T, High Sensitivity 10 <=22 ng/L   Symptomatic COVID-19 Virus (Coronavirus) by PCR Nose    Specimen: Nose; Swab   Result Value Ref Range    SARS CoV2 PCR Negative Negative   UA with Microscopic reflex to Culture    Specimen: Urine, Clean Catch   Result Value Ref Range    Color Urine Light Yellow Colorless, Straw, Light Yellow, Yellow    Appearance Urine Clear Clear    Glucose Urine >1000 (A) Negative mg/dL    Bilirubin Urine Negative Negative    Ketones Urine Negative Negative mg/dL    Specific Gravity Urine 1.013 1.001 - 1.030    Blood Urine Negative Negative    pH Urine 6.0 5.0 - 7.0    Protein Albumin Urine Negative Negative mg/dL    Urobilinogen Urine <2.0 <2.0 mg/dL    Nitrite Urine Negative Negative    Leukocyte Esterase Urine Negative Negative    RBC Urine 1 <=2 /HPF    WBC Urine <1 <=5 /HPF   CBC with platelets and differential   Result Value Ref Range    WBC Count  8.9 4.0 - 11.0 10e3/uL    RBC Count 5.07 4.40 - 5.90 10e6/uL    Hemoglobin 14.9 13.3 - 17.7 g/dL    Hematocrit 47.1 40.0 - 53.0 %    MCV 93 78 - 100 fL    MCH 29.4 26.5 - 33.0 pg    MCHC 31.6 31.5 - 36.5 g/dL    RDW 13.6 10.0 - 15.0 %    Platelet Count 167 150 - 450 10e3/uL    % Neutrophils 53 %    % Lymphocytes 22 %    % Monocytes 4 %    % Eosinophils 19 %    % Basophils 1 %    % Immature Granulocytes 0 %    NRBCs per 100 WBC 0 <1 /100    Absolute Neutrophils 4.8 1.6 - 8.3 10e3/uL    Absolute Lymphocytes 2.0 0.8 - 5.3 10e3/uL    Absolute Monocytes 0.4 0.0 - 1.3 10e3/uL    Absolute Eosinophils 1.7 (H) 0.0 - 0.7 10e3/uL    Absolute Basophils 0.1 0.0 - 0.2 10e3/uL    Absolute Immature Granulocytes 0.0 <=0.4 10e3/uL    Absolute NRBCs 0.0 10e3/uL   Extra Red Top Tube   Result Value Ref Range    Hold Specimen JIC    Extra Green Top (Lithium Heparin) Tube   Result Value Ref Range    Hold Specimen JIC    Extra Purple Top Tube   Result Value Ref Range    Hold Specimen JIC    Extra Blue Top Tube   Result Value Ref Range    Hold Specimen JIC    Result Value Ref Range    CRP Inflammation 4.50 <5.00 mg/L       RADIOLOGY:  Reviewed all pertinent imaging. Please see official radiology report.  Chest XR,  PA & LAT   Final Result   IMPRESSION: No focal airspace opacity. No pleural effusion or pneumothorax. Cardiac silhouette and mediastinal contours are stable.          EKG:    Normal sinus rhythm.  Rate of 89.  Normal QRS.  Normal QT.  No ST or T wave changes.  Compared to the EKG on 5/11/2019 no significant changes are noted.    I have independently reviewed and interpreted the EKG(s) documented above.        I, Argentina Sy, am serving as a scribe to document services personally performed by Josef Galdamez, DO based on my observation and the provider's statements to me. I, Josef Galdamez, attest that Argentina Sy is acting in a scribe capacity, has observed my performance of the services and has documented them in accordance with  my direction.    Josef Galdamez DO  Emergency Medicine  [facility] EMERGENCY DEPARTMENT  [facility] [facility] [id number] [phone]       Josef Galdamez DO  08/14/24 2816

## 2024-08-14 NOTE — ED TRIAGE NOTES
"Pt w COPD reports 5 days weakness, chest congestion, and cough, stating, \" the phelgm wont come up, its stuck in my chest.     Triage Assessment (Adult)       Row Name 08/14/24 1007          Triage Assessment    Airway WDL WDL        Respiratory WDL    Respiratory WDL X;cough     Cough Frequency frequent        Skin Circulation/Temperature WDL    Skin Circulation/Temperature WDL WDL        Cardiac WDL    Cardiac WDL WDL        Peripheral/Neurovascular WDL    Peripheral Neurovascular WDL WDL        Cognitive/Neuro/Behavioral WDL    Cognitive/Neuro/Behavioral WDL WDL                     "

## 2024-08-16 LAB
ATRIAL RATE - MUSE: 89 BPM
DIASTOLIC BLOOD PRESSURE - MUSE: NORMAL MMHG
INTERPRETATION ECG - MUSE: NORMAL
P AXIS - MUSE: 40 DEGREES
PR INTERVAL - MUSE: 180 MS
QRS DURATION - MUSE: 102 MS
QT - MUSE: 382 MS
QTC - MUSE: 464 MS
R AXIS - MUSE: 98 DEGREES
SYSTOLIC BLOOD PRESSURE - MUSE: NORMAL MMHG
T AXIS - MUSE: 33 DEGREES
VENTRICULAR RATE- MUSE: 89 BPM

## 2024-09-16 ENCOUNTER — TELEPHONE (OUTPATIENT)
Dept: PULMONOLOGY | Facility: CLINIC | Age: 79
End: 2024-09-16
Payer: MEDICARE

## 2024-09-16 DIAGNOSIS — J44.9 COPD, GROUP A, BY GOLD 2017 CLASSIFICATION (H): Primary | ICD-10-CM

## 2024-09-16 RX ORDER — GUAIFENESIN 600 MG/1
1200 TABLET, EXTENDED RELEASE ORAL 2 TIMES DAILY
Qty: 60 TABLET | Refills: 3 | Status: SHIPPED | OUTPATIENT
Start: 2024-09-16

## 2024-09-16 NOTE — TELEPHONE ENCOUNTER
[facility] Call Center    Phone Message    May a detailed message be left on voicemail: yes     Reason for Call: Medication Question or concern regarding medication   Prescription Clarification  Name of Medication: guaiFENesin (MUCINEX) 600 MG 12 hr tablet   Prescribing Provider: ED provider - but pt normally sees Dr. Bravo   What on the order needs clarification? Patient states he has continued to take this medication daily for his symptoms and would like to run it by the provider to be sure it's ok. Please call back to advise. Thank you.     Action Taken: Other: Pulm    Travel Screening: Not Applicable     Date of Service: 9/16/24

## 2024-09-23 ENCOUNTER — TELEPHONE (OUTPATIENT)
Dept: PULMONOLOGY | Facility: CLINIC | Age: 79
End: 2024-09-23
Payer: MEDICARE

## 2024-09-23 DIAGNOSIS — J44.9 COPD, GROUP A, BY GOLD 2017 CLASSIFICATION (H): Primary | ICD-10-CM

## 2024-09-23 RX ORDER — PREDNISONE 20 MG/1
TABLET ORAL
Qty: 10 TABLET | Refills: 0 | Status: SHIPPED | OUTPATIENT
Start: 2024-09-23 | End: 2024-09-30

## 2024-09-23 RX ORDER — DOXYCYCLINE 100 MG/1
100 CAPSULE ORAL 2 TIMES DAILY
Qty: 10 CAPSULE | Refills: 0 | Status: SHIPPED | OUTPATIENT
Start: 2024-09-23 | End: 2024-09-28

## 2024-09-23 NOTE — TELEPHONE ENCOUNTER
Returned phone call to patient.  Has been having increased cough.  Difficult to get the secretions up, but when he can they seem to be clear in color.  Had a cold last week and this seems to be settling in his chest.  No fever, No sore throat.  Coughing increases when he starts to move around a bit.  Patient states he does not have any swelling or weight gain.     Will try action plan:  Prednisone 40mg daily x 5 days and doxycycline bid x 5 days.  Instructed patient to call if this is not helpful.

## 2024-09-26 ENCOUNTER — TELEPHONE (OUTPATIENT)
Dept: PULMONOLOGY | Facility: CLINIC | Age: 79
End: 2024-09-26
Payer: MEDICARE

## 2024-09-26 NOTE — TELEPHONE ENCOUNTER
Called and spoke with patient.   He is feeling quite a bit better.  Still occasionally coughing up some clear to whitish phlegm, cut not as much as he was earlier in the week.  No fevers.      Instructed to finish the doses tomorrow.  If not feeling better or the same by Monday, he will give me a call and we can try another round of his action plan.

## 2024-09-26 NOTE — TELEPHONE ENCOUNTER
[facility] Call Center    Phone Message    May a detailed message be left on voicemail: yes     Reason for Call: Other: Writer called pt to schedule follow up, scheduled. Pt has questions about his medications and his current symptoms. Was prescribed: predniSONE (DELTASONE) 20 MG tablet, doxycycline hyclate (VIBRAMYCIN) 100 MG capsule- his medication will be out tomorrow; however he still has some symptoms. Should he come in? Please call pt     Action Taken: Message routed to:  [facility]): pulm     Travel Screening: Not Applicable     Date of Service:

## 2024-09-30 ENCOUNTER — TELEPHONE (OUTPATIENT)
Dept: PULMONOLOGY | Facility: CLINIC | Age: 79
End: 2024-09-30
Payer: MEDICARE

## 2024-09-30 DIAGNOSIS — J44.9 COPD, GROUP A, BY GOLD 2017 CLASSIFICATION (H): ICD-10-CM

## 2024-09-30 RX ORDER — PREDNISONE 20 MG/1
TABLET ORAL
Qty: 10 TABLET | Refills: 0 | Status: SHIPPED | OUTPATIENT
Start: 2024-09-30

## 2024-09-30 RX ORDER — DOXYCYCLINE 100 MG/1
100 CAPSULE ORAL 2 TIMES DAILY
Qty: 10 CAPSULE | Refills: 0 | Status: SHIPPED | OUTPATIENT
Start: 2024-09-30 | End: 2024-10-05

## 2024-09-30 NOTE — TELEPHONE ENCOUNTER
Phone call from patient.  States that he completed his prednisone and doxycycline last Friday.  Feels some better, but continues to cough up some thick whitish colored phlegm.  Not really any increased sob.  No fevers.   Will send one more round of prednisone and doxycycline.  Instructed patient that if this is not helpful, he will need to let me know and we can get him in to see a NP in clinic for evaluation.

## 2024-10-08 ENCOUNTER — TELEPHONE (OUTPATIENT)
Dept: PULMONOLOGY | Facility: CLINIC | Age: 79
End: 2024-10-08

## 2024-10-09 ENCOUNTER — OFFICE VISIT (OUTPATIENT)
Dept: PULMONOLOGY | Facility: CLINIC | Age: 79
End: 2024-10-09
Payer: MEDICARE

## 2024-10-09 VITALS
WEIGHT: 183.8 LBS | SYSTOLIC BLOOD PRESSURE: 132 MMHG | DIASTOLIC BLOOD PRESSURE: 76 MMHG | BODY MASS INDEX: 30.59 KG/M2 | OXYGEN SATURATION: 95 % | HEART RATE: 101 BPM

## 2024-10-09 DIAGNOSIS — J00 ACUTE RHINITIS: ICD-10-CM

## 2024-10-09 DIAGNOSIS — J44.1 COPD EXACERBATION (H): Primary | ICD-10-CM

## 2024-10-09 DIAGNOSIS — J44.9 COPD, GROUP A, BY GOLD 2017 CLASSIFICATION (H): ICD-10-CM

## 2024-10-09 PROCEDURE — 99214 OFFICE O/P EST MOD 30 MIN: CPT | Performed by: NURSE PRACTITIONER

## 2024-10-09 RX ORDER — EMPAGLIFLOZIN 10 MG/1
10 TABLET, FILM COATED ORAL 2 TIMES DAILY
COMMUNITY

## 2024-10-09 RX ORDER — SULFAMETHOXAZOLE AND TRIMETHOPRIM 800; 160 MG/1; MG/1
1 TABLET ORAL
Status: ON HOLD | COMMUNITY
Start: 2024-02-28 | End: 2024-10-25

## 2024-10-09 RX ORDER — CETIRIZINE HYDROCHLORIDE 10 MG/1
10 TABLET ORAL DAILY
Qty: 30 TABLET | Refills: 3 | Status: SHIPPED | OUTPATIENT
Start: 2024-10-09

## 2024-10-09 RX ORDER — PEN NEEDLE, DIABETIC 32GX 5/32"
NEEDLE, DISPOSABLE MISCELLANEOUS
COMMUNITY
Start: 2024-07-19

## 2024-10-09 RX ORDER — TAMSULOSIN HYDROCHLORIDE 0.4 MG/1
0.4 CAPSULE ORAL DAILY
Status: ON HOLD | COMMUNITY
End: 2024-10-25

## 2024-10-09 RX ORDER — CLOTRIMAZOLE AND BETAMETHASONE DIPROPIONATE 10; .64 MG/G; MG/G
CREAM TOPICAL
Status: ON HOLD | COMMUNITY
Start: 2024-04-17 | End: 2024-10-25

## 2024-10-09 RX ORDER — SODIUM BICARBONATE 650 MG/1
TABLET ORAL
Status: ON HOLD | COMMUNITY
End: 2024-10-25

## 2024-10-09 RX ORDER — MULTIVITAMIN WITH IRON
250 TABLET ORAL AT BEDTIME
COMMUNITY

## 2024-10-09 RX ORDER — RESPIRATORY SYNCYTIAL VISUS VACCINE RECOMBINANT, ADJUVANTED 120MCG/0.5
KIT INTRAMUSCULAR
Status: ON HOLD | COMMUNITY
Start: 2024-04-04 | End: 2024-10-25

## 2024-10-09 RX ORDER — ALBUTEROL SULFATE 0.83 MG/ML
2.5 SOLUTION RESPIRATORY (INHALATION) EVERY 6 HOURS PRN
Qty: 90 ML | Refills: 3 | Status: SHIPPED | OUTPATIENT
Start: 2024-10-09

## 2024-10-09 RX ORDER — FLUTICASONE PROPIONATE 50 MCG
1 SPRAY, SUSPENSION (ML) NASAL DAILY
Qty: 16 G | Refills: 3 | Status: ON HOLD | OUTPATIENT
Start: 2024-10-09 | End: 2024-10-25

## 2024-10-09 RX ORDER — LANCETS
EACH MISCELLANEOUS
COMMUNITY

## 2024-10-09 RX ORDER — EMPAGLIFLOZIN 10 MG/1
TABLET, FILM COATED ORAL
Status: ON HOLD | COMMUNITY
Start: 2024-06-27 | End: 2024-10-25

## 2024-10-09 RX ORDER — GEMCITABINE HYDROCHLORIDE 2 G/50ML
INJECTION, POWDER, LYOPHILIZED, FOR SOLUTION INTRAVENOUS
Status: ON HOLD | COMMUNITY
Start: 2024-07-05 | End: 2024-10-25

## 2024-10-09 RX ORDER — ALBUTEROL SULFATE 90 UG/1
2 INHALANT RESPIRATORY (INHALATION) EVERY 4 HOURS PRN
Qty: 18 G | Refills: 3 | Status: SHIPPED | OUTPATIENT
Start: 2024-10-09

## 2024-10-09 NOTE — PATIENT INSTRUCTIONS
It was a pleasure seeing you in clinic today. This is what we discussed:    Continue the Anoro as you have been  Try a allergy pill and nasal spray for 2 weeks. If you congestion improves continue this until it snows. I sent a prescription for these.   When you are having more symptoms try the albuterol inhaler or nebulizer. This is a fast acting medication. The nebulizer will help thin mucous if you feel chest congestion. Use your albuterol every 4 hours as needed for cough, shortness of breath, wheeze, or chest tightness.   Follow up with Dr. Bravo as planned   If you have worsening symptoms, questions, or need to speak with the nurse please call [phone].

## 2024-10-09 NOTE — PROGRESS NOTES
Patient instructed in use of nebulizer machine from [facility].  Patient states good understanding of how to use the nebulizer machine.  Nebulizer machine given to patient from [facility].  All paperwork signed and copy scanned to chart. Phone numbers given to patient to call if any questions.

## 2024-10-09 NOTE — PROGRESS NOTES
Pulmonary Clinic Follow-up Visit  October 9, 2024    Assessment:  79yoM with 80 pack year smoking history, GOLD A COPD here with acute symptoms.  Has noticed increased nasal congestion, cough, thin/clear sputum, for the last 2 to 3 weeks.  Became concerned when he felt like he could not clear chest congestion. Failed action plan x 2.  No change with Mucinex.  Cough drops are very helpful.  He did almost canceled today's appointment as he has finally started feeling better but continues to have frequent postnasal drip and throat clearing.  He did not have an active albuterol prescription and has been using Anoro once daily.    As he has noted improvement he could be he needed to complete his second action plan.  I suspect his nasal congestion is driving the increased symptoms.  We discussed a trial of OTC antihistamine and Flonase to help dry this up.  We went over albuterol use and I represcribed the albuterol HFA.  He is also interested in having a nebulizer at home and available for increased chest congestion in the future.     Plan:  Not eligible for lung cancer screening--quit >15 years ago  Continue Anoro  Action plan in place, will call with exacerbation  Immunization for RSV, flu, and COVID up to date.   Trial Zyrtec 10 mg daily and Flonase nasal spray twice daily. If this is helpful and dries up his congestion suggested he continue this until symptoms resolved.    Albuterol HFA or nebulized as needed.  He was supplied with nebulizer machine in clinic today.    If his symptoms exacerbate: Prednisone 40 mg daily x 5 days, add doxycycline 100 mg twice daily x 5 days if sputum amount or thickness increased.  Encourage albuterol every 4-6 hours while awake until symptoms improve.    Ericka Lancaster, CNP  Pulmonary Medicine  [facility]   [phone]    ----------------------------    CCx: COPD    HPI:   Was initially established after hospitalization for NURY pneumonia. Followed in this clinic by Dr. Bravo who  is here for an acute visit. Last seen 03/2024.  Reporting increased cough and chest congestion for 2+ weeks. No fever or chills. Denies hemoptysis. Has tried Mucinex with no change. Given action plan on 9/23, initial improvement but did not return to baseline. Repeat action plan (prednisone and doxycycline) on 9/30.  Continued to have increased symptoms so he made this appointment but reports feeling much better today.   Recently started using cough drops and feels this has been the most helpful.    Had cystoscopy for bladder cancer.   Breathing at baseline, feels the Anoro helps.   Has plans to go deer and wild for hunting this fall.  He is very excited about this and help his breathing does not affect this.    Current Regimen: Anoro    Patient supplied answers from flow sheet for:  COPD Assessment Test (CAT)  2009 [employer]. All rights reserved.      1/30/2020     1:00 PM 2/21/2022    10:00 AM 2/21/2023    10:00 AM 3/20/2024    12:00 PM 10/9/2024     7:59 AM   COPD assessment test (CAT)   Cough 4 0 0 1 3   Phlegm 5 0 0 0 3   Chest tightness 4 0 0 0 2   Walk up hill 3 3 2 2 3   Limited activities 5 0 0 1 1   Leaving my home 0 0 0 0 2   Sleep 2 2 2 0 1   Energy 3 3 3 0 2   Total Score 26 8 7 4 17      ROS:  10-point review performed and notable for  Shortness of breath and leg swelling. The remainder reviewed and negative.     Current Meds:  Current Outpatient Medications   Medication Sig Dispense Refill    AREXVY injection       aspirin 81 MG EC tablet Take 1 tablet (81 mg) by mouth daily      BD PEN NEEDLE CALLI 2ND GEN 32G X 4 MM miscellaneous USE AS DIRECTED ONCE PER DAY      blood glucose (NO BRAND SPECIFIED) test strip TEST THREE TIMES DAILY      clotrimazole-betamethasone (LOTRISONE) 1-0.05 % external cream APPLY TOPICALLY TO THE AFFECTED AND SURROUNDING AREAS TWICE DAILY IN THE MORNING AND IN THE EVENING FOR 2 WEEKS      cyanocobalamin (VITAMIN B-12) 1000 MCG tablet Take 1,000 mcg by mouth daily.      gemcitabine  (GEMZAR) 2 G/52.6 ML injection inject 2 grams intravesically      guaiFENesin (MUCINEX) 600 MG 12 hr tablet Take 2 tablets (1,200 mg) by mouth 2 times daily. 60 tablet 3    insulin detemir (LEVEMIR PEN) 100 UNIT/ML pen Inject 55 Units Subcutaneous at bedtime      JARDIANCE 10 MG TABS tablet TAKE 1 TABLET BY MOUTH DAILY FOR DIABETES      lisinopril (PRINIVIL,ZESTRIL) 10 MG tablet [LISINOPRIL (PRINIVIL,ZESTRIL) 10 MG TABLET] Take 10 mg by mouth daily.      magnesium 250 MG tablet 1 tablet with a meal Orally Once a day      metFORMIN (GLUCOPHAGE) 500 MG tablet [METFORMIN (GLUCOPHAGE) 500 MG TABLET] Take 1,000 mg by mouth 2 (two) times a day with meals.      pioglitazone (ACTOS) 15 MG tablet Take 15 mg by mouth daily      primidone (MYSOLINE) 50 MG tablet [PRIMIDONE (MYSOLINE) 50 MG TABLET] Take 50 mg by mouth at bedtime.      simvastatin (ZOCOR) 40 MG tablet Take 40 mg by mouth every evening      sodium bicarbonate 650 MG tablet TAKE 2 TABLET BY MOUTH IN EVENING PRIOR TO AND MORNING OF TREATMENT FOR 3 TREATMENTS      sulfamethoxazole-trimethoprim (BACTRIM DS) 800-160 MG tablet Take 1 tablet by mouth 2 times daily.      tamsulosin (FLOMAX) 0.4 MG capsule Take 0.4 mg by mouth daily.      umeclidinium-vilanterol (ANORO ELLIPTA) 62.5-25 MCG/ACT oral inhaler Inhale 1 puff into the lungs daily 60 each 11    acetaminophen (TYLENOL) 500 MG tablet Take 1-2 tablets (500-1,000 mg) by mouth every 6 hours as needed for mild pain (Patient not taking: Reported on 3/20/2024) 20 tablet 1    blood glucose (NO BRAND SPECIFIED) test strip USE TO CHECK BLOOD SUGAR LEVELS THREE TIMES DAILY (Patient not taking: Reported on 10/9/2024)      blood glucose monitoring (SOFTCLIX) lancets TEST THREE TIMES DAILY AS DIRECTED (Patient not taking: Reported on 10/9/2024)      JARDIANCE 10 MG TABS tablet TAKE 1 TABLET BY MOUTH DAILY FOR DIABETES (Patient not taking: Reported on 10/9/2024)      predniSONE (DELTASONE) 20 MG tablet Take 2 tabs daily x 5  days (Patient not taking: Reported on 10/9/2024) 10 tablet 0       Physical Exam:  /76 (BP Location: Right arm, Patient Position: Sitting, Cuff Size: Adult Regular)   Pulse 101   Wt 83.4 kg (183 lb 12.8 oz)   SpO2 95%   BMI 30.59 kg/m      Physical Exam  Constitutional:       General: He is not in acute distress.     Appearance: He is not ill-appearing.   Cardiovascular:      Rate and Rhythm: Normal rate and regular rhythm.   Pulmonary:      Effort: Pulmonary effort is normal. No respiratory distress.      Breath sounds: No wheezing or rhonchi.      Comments: No cough during visit. Frequent throat clearing with congested sounding voice.   Musculoskeletal:      Right lower leg: No edema.      Left lower leg: No edema.   Neurological:      Mental Status: He is alert.   Psychiatric:         Behavior: Behavior normal.          Labs:  CBC RESULTS:   Recent Labs   Lab Test 07/09/19  1428 05/17/19  0710   WBC  --  8.2   RBC  --  4.84   HGB 15.5 14.0   HCT  --  42.9   MCV  --  89   MCH  --  28.9   MCHC  --  32.6   RDW  --  12.5   PLT  --  194     Sodium   Date Value Ref Range Status   08/14/2024 136 135 - 145 mmol/L Final   05/23/2019 140 133 - 144 mmol/L Final     Potassium   Date Value Ref Range Status   08/14/2024 4.8 3.4 - 5.3 mmol/L Final   12/10/2021 4.5 3.5 - 5.0 mmol/L Final   05/23/2019 4.3 3.4 - 5.3 mmol/L Final     Chloride   Date Value Ref Range Status   08/14/2024 100 98 - 107 mmol/L Final   12/10/2021 102 98 - 107 mmol/L Final   05/23/2019 107 94 - 109 mmol/L Final     Carbon Dioxide   Date Value Ref Range Status   05/23/2019 28 20 - 32 mmol/L Final     Carbon Dioxide (CO2)   Date Value Ref Range Status   08/14/2024 23 22 - 29 mmol/L Final   12/10/2021 28 22 - 31 mmol/L Final     Anion Gap   Date Value Ref Range Status   08/14/2024 13 7 - 15 mmol/L Final   12/10/2021 9 5 - 18 mmol/L Final   05/23/2019 5 3 - 14 mmol/L Final     Glucose   Date Value Ref Range Status   08/14/2024 111 (H) 70 - 99 mg/dL  Final   12/10/2021 172 (H) 70 - 125 mg/dL Final   05/23/2019 103 (H) 70 - 99 mg/dL Final     GLUCOSE BY METER POCT   Date Value Ref Range Status   03/11/2024 152 (H) 70 - 99 mg/dL Final     Urea Nitrogen   Date Value Ref Range Status   08/14/2024 17.6 8.0 - 23.0 mg/dL Final   12/10/2021 10 8 - 28 mg/dL Final   05/23/2019 22 7 - 30 mg/dL Final     Creatinine   Date Value Ref Range Status   08/14/2024 0.70 0.67 - 1.17 mg/dL Final   05/23/2019 0.80 0.66 - 1.25 mg/dL Final     GFR Estimate   Date Value Ref Range Status   08/14/2024 >90 >60 mL/min/1.73m2 Final     Comment:     eGFR calculated using 2021 CKD-EPI equation.   11/27/2020 >60 >60 mL/min/1.73m2 Final   05/23/2019 88 >60 mL/min/[1.73_m2] Final     Comment:     Non  GFR Calc  Starting 12/18/2018, serum creatinine based estimated GFR (eGFR) will be   calculated using the Chronic Kidney Disease Epidemiology Collaboration   (CKD-EPI) equation.       Calcium   Date Value Ref Range Status   08/14/2024 8.5 (L) 8.8 - 10.4 mg/dL Final     Comment:     Reference intervals for this test were updated on 7/16/2024 to reflect our healthy population more accurately. There may be differences in the flagging of prior results with similar values performed with this method. Those prior results can be interpreted in the context of the updated reference intervals.   05/23/2019 8.6 8.5 - 10.1 mg/dL Final         Imaging studies:  Personally reviewed image/s and Personally reviewed impression/s    EXAM: CT CHEST PE STUDY   LOCATION: [facility] MEDICAL IMAGING   DATE: 8/31/2023     INDICATION: Postoperative. Tachycardia. Leukocytosis. Pulmonary embolism (PE) suspected, high prob   COMPARISON: None.   TECHNIQUE: CT chest pulmonary angiogram during arterial phase injection of IV contrast. Multiplanar reformats and MIP reconstructions were performed. Dose reduction techniques were used.   CONTRAST: IOHEXOL 350 MG IODINE/ML  ML BOTTLE: 50mL     FINDINGS:   ANGIOGRAM CHEST:  Pulmonary arteries are normal caliber and negative for pulmonary emboli. Thoracic aorta is negative for dissection. No CT evidence of right heart strain.     LUNGS AND PLEURA: Mild bandlike scarring in the upper lungs and lingula. Mild mucous plugging and bronchial wall thickening in the lower lungs. No focal consolidation or focal groundglass opacity. No pleural effusion or pneumothorax. No suspicious pulmonary nodule or mass.     MEDIASTINUM/AXILLAE: No adenopathy. No pericardial effusion.     CORONARY ARTERY CALCIFICATION: Severe.     UPPER ABDOMEN: Small benign simple bilateral renal cysts, no follow-up needed.     MUSCULOSKELETAL: No suspicious osseous lesion. Mild scattered degenerative changes in the spine.   Impression:  1. Negative for pulmonary embolism.  2. No evidence of pneumonia.  3. Mild bronchial thickening and mucous plugging in the lower lungs suggesting bronchitis.      PFT's  FEV1/FVC is 61 and is reduced.  FEV1 is 1.34 L (52% predicted) and is reduced.  FVC is 2.21 L (64% predicted) and reduced.  There was no improvement in spirometry after a single inhaled dose of bronchodilator.  TLC is 5.44 L (89% predicted) and is normal.  RV is 3.45 L (133% predicted) and is increased.  DLCO is 71 % predicted and is reduced when it was corrected for hemoglobin.  Impression: moderate obstruction without reversibility. Mildly reduced diffusion capacity

## 2024-10-17 ENCOUNTER — TELEPHONE (OUTPATIENT)
Dept: PULMONOLOGY | Facility: CLINIC | Age: 79
End: 2024-10-17
Payer: MEDICARE

## 2024-10-17 NOTE — TELEPHONE ENCOUNTER
Phone call from patient.  Asking if he is able to bring his nebulizer machine on the airplane this November when he will be traveling.    Suggested he check with airlines to be sure, but he should be able to bring this through security.

## 2024-10-24 ENCOUNTER — HOSPITAL ENCOUNTER (INPATIENT)
Facility: HOSPITAL | Age: 79
LOS: 2 days | Discharge: HOME | DRG: 189 | End: 2024-10-27
Attending: FAMILY MEDICINE | Admitting: INTERNAL MEDICINE
Payer: MEDICARE

## 2024-10-24 DIAGNOSIS — J44.1 COPD WITH ACUTE EXACERBATION (H): ICD-10-CM

## 2024-10-24 DIAGNOSIS — E11.65 POORLY CONTROLLED TYPE 2 DIABETES MELLITUS (H): ICD-10-CM

## 2024-10-24 DIAGNOSIS — J96.21 ACUTE ON CHRONIC RESPIRATORY FAILURE WITH HYPOXIA AND HYPERCAPNIA (H): ICD-10-CM

## 2024-10-24 DIAGNOSIS — J96.22 ACUTE ON CHRONIC RESPIRATORY FAILURE WITH HYPOXIA AND HYPERCAPNIA (H): ICD-10-CM

## 2024-10-24 PROCEDURE — 99291 CRITICAL CARE FIRST HOUR: CPT | Mod: 25

## 2024-10-24 PROCEDURE — 83036 HEMOGLOBIN GLYCOSYLATED A1C: CPT | Performed by: FAMILY MEDICINE

## 2024-10-24 PROCEDURE — 99292 CRITICAL CARE ADDL 30 MIN: CPT

## 2024-10-24 PROCEDURE — 85025 COMPLETE CBC W/AUTO DIFF WBC: CPT | Performed by: FAMILY MEDICINE

## 2024-10-24 PROCEDURE — 36415 COLL VENOUS BLD VENIPUNCTURE: CPT | Performed by: FAMILY MEDICINE

## 2024-10-24 PROCEDURE — 87637 SARSCOV2&INF A&B&RSV AMP PRB: CPT | Performed by: FAMILY MEDICINE

## 2024-10-24 PROCEDURE — 94660 CPAP INITIATION&MGMT: CPT

## 2024-10-24 PROCEDURE — 83605 ASSAY OF LACTIC ACID: CPT | Performed by: FAMILY MEDICINE

## 2024-10-24 PROCEDURE — 83735 ASSAY OF MAGNESIUM: CPT | Performed by: FAMILY MEDICINE

## 2024-10-24 PROCEDURE — 84484 ASSAY OF TROPONIN QUANT: CPT | Performed by: FAMILY MEDICINE

## 2024-10-24 PROCEDURE — 82805 BLOOD GASES W/O2 SATURATION: CPT | Performed by: FAMILY MEDICINE

## 2024-10-24 PROCEDURE — 83880 ASSAY OF NATRIURETIC PEPTIDE: CPT | Performed by: FAMILY MEDICINE

## 2024-10-24 PROCEDURE — 85379 FIBRIN DEGRADATION QUANT: CPT | Performed by: FAMILY MEDICINE

## 2024-10-24 PROCEDURE — 5A09357 ASSISTANCE WITH RESPIRATORY VENTILATION, LESS THAN 24 CONSECUTIVE HOURS, CONTINUOUS POSITIVE AIRWAY PRESSURE: ICD-10-PCS | Performed by: FAMILY MEDICINE

## 2024-10-24 PROCEDURE — 80048 BASIC METABOLIC PNL TOTAL CA: CPT | Performed by: FAMILY MEDICINE

## 2024-10-24 PROCEDURE — 999N000157 HC STATISTIC RCP TIME EA 10 MIN

## 2024-10-24 RX ORDER — METHYLPREDNISOLONE SODIUM SUCCINATE 125 MG/2ML
125 INJECTION INTRAMUSCULAR; INTRAVENOUS ONCE
Status: COMPLETED | OUTPATIENT
Start: 2024-10-25 | End: 2024-10-25

## 2024-10-24 RX ORDER — LEVALBUTEROL INHALATION SOLUTION 1.25 MG/3ML
1.25 SOLUTION RESPIRATORY (INHALATION)
Status: DISCONTINUED | OUTPATIENT
Start: 2024-10-24 | End: 2024-10-27 | Stop reason: HOSPADM

## 2024-10-24 RX ORDER — MAGNESIUM SULFATE HEPTAHYDRATE 40 MG/ML
2 INJECTION, SOLUTION INTRAVENOUS ONCE
Status: DISCONTINUED | OUTPATIENT
Start: 2024-10-25 | End: 2024-10-25

## 2024-10-24 ASSESSMENT — COLUMBIA-SUICIDE SEVERITY RATING SCALE - C-SSRS
1. IN THE PAST MONTH, HAVE YOU WISHED YOU WERE DEAD OR WISHED YOU COULD GO TO SLEEP AND NOT WAKE UP?: NO
6. HAVE YOU EVER DONE ANYTHING, STARTED TO DO ANYTHING, OR PREPARED TO DO ANYTHING TO END YOUR LIFE?: NO
2. HAVE YOU ACTUALLY HAD ANY THOUGHTS OF KILLING YOURSELF IN THE PAST MONTH?: NO

## 2024-10-25 ENCOUNTER — APPOINTMENT (OUTPATIENT)
Dept: RADIOLOGY | Facility: HOSPITAL | Age: 79
DRG: 189 | End: 2024-10-25
Attending: FAMILY MEDICINE
Payer: MEDICARE

## 2024-10-25 PROBLEM — J44.1 COPD WITH ACUTE EXACERBATION (H): Status: ACTIVE | Noted: 2024-10-25

## 2024-10-25 PROBLEM — J96.22 ACUTE ON CHRONIC RESPIRATORY FAILURE WITH HYPOXIA AND HYPERCAPNIA (H): Status: ACTIVE | Noted: 2024-10-25

## 2024-10-25 PROBLEM — E11.65 POORLY CONTROLLED TYPE 2 DIABETES MELLITUS (H): Status: ACTIVE | Noted: 2024-10-25

## 2024-10-25 PROBLEM — J96.21 ACUTE ON CHRONIC RESPIRATORY FAILURE WITH HYPOXIA AND HYPERCAPNIA (H): Status: ACTIVE | Noted: 2024-10-25

## 2024-10-25 LAB
ALBUMIN SERPL BCG-MCNC: 4 G/DL (ref 3.5–5.2)
ALP SERPL-CCNC: 107 U/L (ref 40–150)
ALT SERPL W P-5'-P-CCNC: 15 U/L (ref 0–70)
ANION GAP SERPL CALCULATED.3IONS-SCNC: 12 MMOL/L (ref 7–15)
ANION GAP SERPL CALCULATED.3IONS-SCNC: 12 MMOL/L (ref 7–15)
AST SERPL W P-5'-P-CCNC: 11 U/L (ref 0–45)
ATRIAL RATE - MUSE: 136 BPM
BACTERIA SPT CULT: NORMAL
BASE EXCESS BLDA CALC-SCNC: 0 MMOL/L (ref -3–3)
BASE EXCESS BLDV CALC-SCNC: -1.5 MMOL/L (ref -3–3)
BASE EXCESS BLDV CALC-SCNC: 1 MMOL/L (ref -3–3)
BASOPHILS # BLD AUTO: 0.1 10E3/UL (ref 0–0.2)
BASOPHILS # BLD AUTO: 0.1 10E3/UL (ref 0–0.2)
BASOPHILS NFR BLD AUTO: 1 %
BASOPHILS NFR BLD AUTO: 1 %
BILIRUB SERPL-MCNC: 0.2 MG/DL
BUN SERPL-MCNC: 14.5 MG/DL (ref 8–23)
BUN SERPL-MCNC: 15.3 MG/DL (ref 8–23)
C PNEUM DNA SPEC QL NAA+PROBE: NOT DETECTED
CALCIUM SERPL-MCNC: 8.7 MG/DL (ref 8.8–10.4)
CALCIUM SERPL-MCNC: 9.2 MG/DL (ref 8.8–10.4)
CHLORIDE SERPL-SCNC: 103 MMOL/L (ref 98–107)
CHLORIDE SERPL-SCNC: 98 MMOL/L (ref 98–107)
COHGB MFR BLD: 99.4 % (ref 95–96)
CREAT SERPL-MCNC: 0.75 MG/DL (ref 0.67–1.17)
CREAT SERPL-MCNC: 0.88 MG/DL (ref 0.67–1.17)
D DIMER PPP FEU-MCNC: <0.27 UG/ML FEU (ref 0–0.5)
DIASTOLIC BLOOD PRESSURE - MUSE: NORMAL MMHG
EGFRCR SERPLBLD CKD-EPI 2021: 87 ML/MIN/1.73M2
EGFRCR SERPLBLD CKD-EPI 2021: >90 ML/MIN/1.73M2
EOSINOPHIL # BLD AUTO: 0.1 10E3/UL (ref 0–0.7)
EOSINOPHIL # BLD AUTO: 1.6 10E3/UL (ref 0–0.7)
EOSINOPHIL NFR BLD AUTO: 1 %
EOSINOPHIL NFR BLD AUTO: 13 %
ERYTHROCYTE [DISTWIDTH] IN BLOOD BY AUTOMATED COUNT: 13.3 % (ref 10–15)
ERYTHROCYTE [DISTWIDTH] IN BLOOD BY AUTOMATED COUNT: 13.4 % (ref 10–15)
EST. AVERAGE GLUCOSE BLD GHB EST-MCNC: 255 MG/DL
FLUAV H1 2009 PAND RNA SPEC QL NAA+PROBE: NOT DETECTED
FLUAV H1 RNA SPEC QL NAA+PROBE: NOT DETECTED
FLUAV H3 RNA SPEC QL NAA+PROBE: NOT DETECTED
FLUAV RNA SPEC QL NAA+PROBE: NEGATIVE
FLUAV RNA SPEC QL NAA+PROBE: NOT DETECTED
FLUBV RNA RESP QL NAA+PROBE: NEGATIVE
FLUBV RNA SPEC QL NAA+PROBE: NOT DETECTED
GLUCOSE BLDC GLUCOMTR-MCNC: 254 MG/DL (ref 70–99)
GLUCOSE BLDC GLUCOMTR-MCNC: 310 MG/DL (ref 70–99)
GLUCOSE BLDC GLUCOMTR-MCNC: 314 MG/DL (ref 70–99)
GLUCOSE BLDC GLUCOMTR-MCNC: 319 MG/DL (ref 70–99)
GLUCOSE BLDC GLUCOMTR-MCNC: 331 MG/DL (ref 70–99)
GLUCOSE BLDC GLUCOMTR-MCNC: 367 MG/DL (ref 70–99)
GLUCOSE BLDC GLUCOMTR-MCNC: 448 MG/DL (ref 70–99)
GLUCOSE SERPL-MCNC: 361 MG/DL (ref 70–99)
GLUCOSE SERPL-MCNC: 516 MG/DL (ref 70–99)
GRAM STAIN RESULT: NORMAL
HADV DNA SPEC QL NAA+PROBE: NOT DETECTED
HBA1C MFR BLD: 10.5 %
HCO3 BLD-SCNC: 27 MMOL/L (ref 21–28)
HCO3 BLDV-SCNC: 26 MMOL/L (ref 21–28)
HCO3 BLDV-SCNC: 31 MMOL/L (ref 21–28)
HCO3 SERPL-SCNC: 26 MMOL/L (ref 22–29)
HCO3 SERPL-SCNC: 29 MMOL/L (ref 22–29)
HCOV PNL SPEC NAA+PROBE: NOT DETECTED
HCT VFR BLD AUTO: 47.1 % (ref 40–53)
HCT VFR BLD AUTO: 50.7 % (ref 40–53)
HGB BLD-MCNC: 14.7 G/DL (ref 13.3–17.7)
HGB BLD-MCNC: 15.7 G/DL (ref 13.3–17.7)
HMPV RNA SPEC QL NAA+PROBE: NOT DETECTED
HOLD SPECIMEN: NORMAL
HPIV1 RNA SPEC QL NAA+PROBE: NOT DETECTED
HPIV2 RNA SPEC QL NAA+PROBE: NOT DETECTED
HPIV3 RNA SPEC QL NAA+PROBE: NOT DETECTED
HPIV4 RNA SPEC QL NAA+PROBE: NOT DETECTED
IMM GRANULOCYTES # BLD: 0.1 10E3/UL
IMM GRANULOCYTES # BLD: 0.1 10E3/UL
IMM GRANULOCYTES NFR BLD: 0 %
IMM GRANULOCYTES NFR BLD: 1 %
INTERPRETATION ECG - MUSE: NORMAL
L PNEUMO1 AG UR QL IA: NEGATIVE
LACTATE SERPL-SCNC: 2 MMOL/L (ref 0.7–2)
LYMPHOCYTES # BLD AUTO: 0.7 10E3/UL (ref 0.8–5.3)
LYMPHOCYTES # BLD AUTO: 3.5 10E3/UL (ref 0.8–5.3)
LYMPHOCYTES NFR BLD AUTO: 29 %
LYMPHOCYTES NFR BLD AUTO: 7 %
M PNEUMO DNA SPEC QL NAA+PROBE: NOT DETECTED
MAGNESIUM SERPL-MCNC: 2.4 MG/DL (ref 1.7–2.3)
MCH RBC QN AUTO: 29.1 PG (ref 26.5–33)
MCH RBC QN AUTO: 29.2 PG (ref 26.5–33)
MCHC RBC AUTO-ENTMCNC: 31 G/DL (ref 31.5–36.5)
MCHC RBC AUTO-ENTMCNC: 31.2 G/DL (ref 31.5–36.5)
MCV RBC AUTO: 94 FL (ref 78–100)
MCV RBC AUTO: 94 FL (ref 78–100)
MONOCYTES # BLD AUTO: 0.1 10E3/UL (ref 0–1.3)
MONOCYTES # BLD AUTO: 0.8 10E3/UL (ref 0–1.3)
MONOCYTES NFR BLD AUTO: 1 %
MONOCYTES NFR BLD AUTO: 7 %
NEUTROPHILS # BLD AUTO: 6.1 10E3/UL (ref 1.6–8.3)
NEUTROPHILS # BLD AUTO: 9.8 10E3/UL (ref 1.6–8.3)
NEUTROPHILS NFR BLD AUTO: 50 %
NEUTROPHILS NFR BLD AUTO: 91 %
NRBC # BLD AUTO: 0 10E3/UL
NRBC # BLD AUTO: 0 10E3/UL
NRBC BLD AUTO-RTO: 0 /100
NRBC BLD AUTO-RTO: 0 /100
NT-PROBNP SERPL-MCNC: <36 PG/ML (ref 0–1800)
O2/TOTAL GAS SETTING VFR VENT: 21 %
O2/TOTAL GAS SETTING VFR VENT: 28 %
O2/TOTAL GAS SETTING VFR VENT: 35 %
OXYHGB MFR BLDV: 39 % (ref 70–75)
OXYHGB MFR BLDV: 95 % (ref 70–75)
P AXIS - MUSE: 83 DEGREES
PCO2 BLD: 52 MM HG (ref 35–45)
PCO2 BLDV: 55 MM HG (ref 40–50)
PCO2 BLDV: 75 MM HG (ref 40–50)
PH BLD: 7.33 [PH] (ref 7.35–7.45)
PH BLDV: 7.23 [PH] (ref 7.32–7.43)
PH BLDV: 7.29 [PH] (ref 7.32–7.43)
PLATELET # BLD AUTO: 179 10E3/UL (ref 150–450)
PLATELET # BLD AUTO: 210 10E3/UL (ref 150–450)
PO2 BLD: 145 MM HG (ref 80–105)
PO2 BLDV: 27 MM HG (ref 25–47)
PO2 BLDV: 80 MM HG (ref 25–47)
POTASSIUM SERPL-SCNC: 5.1 MMOL/L (ref 3.4–5.3)
POTASSIUM SERPL-SCNC: 5.3 MMOL/L (ref 3.4–5.3)
PR INTERVAL - MUSE: 158 MS
PROT SERPL-MCNC: 7.1 G/DL (ref 6.4–8.3)
QRS DURATION - MUSE: 90 MS
QT - MUSE: 288 MS
QTC - MUSE: 433 MS
R AXIS - MUSE: 169 DEGREES
RBC # BLD AUTO: 5.03 10E6/UL (ref 4.4–5.9)
RBC # BLD AUTO: 5.39 10E6/UL (ref 4.4–5.9)
RSV RNA SPEC NAA+PROBE: NEGATIVE
RSV RNA SPEC QL NAA+PROBE: NOT DETECTED
RSV RNA SPEC QL NAA+PROBE: NOT DETECTED
RV+EV RNA SPEC QL NAA+PROBE: NOT DETECTED
S PNEUM AG SPEC QL: NEGATIVE
SAO2 % BLDA: 98 % (ref 92–100)
SAO2 % BLDV: 39.5 % (ref 70–75)
SAO2 % BLDV: 95.6 % (ref 70–75)
SARS-COV-2 RNA RESP QL NAA+PROBE: NEGATIVE
SODIUM SERPL-SCNC: 139 MMOL/L (ref 135–145)
SODIUM SERPL-SCNC: 141 MMOL/L (ref 135–145)
SPECIMEN TYPE: NORMAL
SYSTOLIC BLOOD PRESSURE - MUSE: NORMAL MMHG
T AXIS - MUSE: 77 DEGREES
TROPONIN T SERPL HS-MCNC: 14 NG/L
TROPONIN T SERPL HS-MCNC: 14 NG/L
VENTRICULAR RATE- MUSE: 136 BPM
WBC # BLD AUTO: 10.8 10E3/UL (ref 4–11)
WBC # BLD AUTO: 12.2 10E3/UL (ref 4–11)

## 2024-10-25 PROCEDURE — 82962 GLUCOSE BLOOD TEST: CPT

## 2024-10-25 PROCEDURE — 87486 CHLMYD PNEUM DNA AMP PROBE: CPT | Performed by: INTERNAL MEDICINE

## 2024-10-25 PROCEDURE — 120N000013 HC R&B IMCU

## 2024-10-25 PROCEDURE — 87899 AGENT NOS ASSAY W/OPTIC: CPT | Performed by: INTERNAL MEDICINE

## 2024-10-25 PROCEDURE — 99418 PROLNG IP/OBS E/M EA 15 MIN: CPT | Performed by: INTERNAL MEDICINE

## 2024-10-25 PROCEDURE — 82805 BLOOD GASES W/O2 SATURATION: CPT | Performed by: FAMILY MEDICINE

## 2024-10-25 PROCEDURE — 250N000013 HC RX MED GY IP 250 OP 250 PS 637: Performed by: INTERNAL MEDICINE

## 2024-10-25 PROCEDURE — 250N000011 HC RX IP 250 OP 636: Performed by: FAMILY MEDICINE

## 2024-10-25 PROCEDURE — 250N000009 HC RX 250: Performed by: INTERNAL MEDICINE

## 2024-10-25 PROCEDURE — 99222 1ST HOSP IP/OBS MODERATE 55: CPT | Performed by: INTERNAL MEDICINE

## 2024-10-25 PROCEDURE — 250N000012 HC RX MED GY IP 250 OP 636 PS 637: Performed by: INTERNAL MEDICINE

## 2024-10-25 PROCEDURE — 94640 AIRWAY INHALATION TREATMENT: CPT | Mod: 76

## 2024-10-25 PROCEDURE — 258N000003 HC RX IP 258 OP 636: Performed by: FAMILY MEDICINE

## 2024-10-25 PROCEDURE — 999N000157 HC STATISTIC RCP TIME EA 10 MIN

## 2024-10-25 PROCEDURE — 250N000012 HC RX MED GY IP 250 OP 636 PS 637: Performed by: FAMILY MEDICINE

## 2024-10-25 PROCEDURE — 71045 X-RAY EXAM CHEST 1 VIEW: CPT

## 2024-10-25 PROCEDURE — 96375 TX/PRO/DX INJ NEW DRUG ADDON: CPT

## 2024-10-25 PROCEDURE — 96374 THER/PROPH/DIAG INJ IV PUSH: CPT

## 2024-10-25 PROCEDURE — 36600 WITHDRAWAL OF ARTERIAL BLOOD: CPT

## 2024-10-25 PROCEDURE — 94660 CPAP INITIATION&MGMT: CPT

## 2024-10-25 PROCEDURE — 80053 COMPREHEN METABOLIC PANEL: CPT | Performed by: INTERNAL MEDICINE

## 2024-10-25 PROCEDURE — 250N000011 HC RX IP 250 OP 636: Performed by: INTERNAL MEDICINE

## 2024-10-25 PROCEDURE — 82805 BLOOD GASES W/O2 SATURATION: CPT | Performed by: INTERNAL MEDICINE

## 2024-10-25 PROCEDURE — 94640 AIRWAY INHALATION TREATMENT: CPT

## 2024-10-25 PROCEDURE — 250N000009 HC RX 250: Performed by: FAMILY MEDICINE

## 2024-10-25 PROCEDURE — 84484 ASSAY OF TROPONIN QUANT: CPT | Performed by: FAMILY MEDICINE

## 2024-10-25 PROCEDURE — 87385 HISTOPLASMA CAPSUL AG IA: CPT | Performed by: INTERNAL MEDICINE

## 2024-10-25 PROCEDURE — 82785 ASSAY OF IGE: CPT | Performed by: INTERNAL MEDICINE

## 2024-10-25 PROCEDURE — 36415 COLL VENOUS BLD VENIPUNCTURE: CPT | Performed by: INTERNAL MEDICINE

## 2024-10-25 PROCEDURE — 99223 1ST HOSP IP/OBS HIGH 75: CPT | Mod: AI | Performed by: INTERNAL MEDICINE

## 2024-10-25 PROCEDURE — 250N000013 HC RX MED GY IP 250 OP 250 PS 637: Performed by: STUDENT IN AN ORGANIZED HEALTH CARE EDUCATION/TRAINING PROGRAM

## 2024-10-25 PROCEDURE — 85025 COMPLETE CBC W/AUTO DIFF WBC: CPT | Performed by: INTERNAL MEDICINE

## 2024-10-25 PROCEDURE — 250N000012 HC RX MED GY IP 250 OP 636 PS 637: Performed by: STUDENT IN AN ORGANIZED HEALTH CARE EDUCATION/TRAINING PROGRAM

## 2024-10-25 PROCEDURE — 87070 CULTURE OTHR SPECIMN AEROBIC: CPT | Performed by: INTERNAL MEDICINE

## 2024-10-25 RX ORDER — DOCUSATE SODIUM 100 MG/1
100 CAPSULE, LIQUID FILLED ORAL 2 TIMES DAILY
Status: DISCONTINUED | OUTPATIENT
Start: 2024-10-25 | End: 2024-10-27 | Stop reason: HOSPADM

## 2024-10-25 RX ORDER — AMOXICILLIN 250 MG
1 CAPSULE ORAL 2 TIMES DAILY PRN
Status: DISCONTINUED | OUTPATIENT
Start: 2024-10-25 | End: 2024-10-27 | Stop reason: HOSPADM

## 2024-10-25 RX ORDER — BENZONATATE 100 MG/1
100 CAPSULE ORAL 3 TIMES DAILY PRN
Status: DISCONTINUED | OUTPATIENT
Start: 2024-10-25 | End: 2024-10-27 | Stop reason: HOSPADM

## 2024-10-25 RX ORDER — FAMOTIDINE 20 MG/1
20 TABLET, FILM COATED ORAL 2 TIMES DAILY
Status: DISCONTINUED | OUTPATIENT
Start: 2024-10-25 | End: 2024-10-27 | Stop reason: HOSPADM

## 2024-10-25 RX ORDER — METHYLPREDNISOLONE SODIUM SUCCINATE 125 MG/2ML
60 INJECTION INTRAMUSCULAR; INTRAVENOUS EVERY 8 HOURS
Status: DISCONTINUED | OUTPATIENT
Start: 2024-10-25 | End: 2024-10-26

## 2024-10-25 RX ORDER — CEFTRIAXONE 2 G/1
2 INJECTION, POWDER, FOR SOLUTION INTRAMUSCULAR; INTRAVENOUS ONCE
Status: COMPLETED | OUTPATIENT
Start: 2024-10-25 | End: 2024-10-25

## 2024-10-25 RX ORDER — NICOTINE POLACRILEX 4 MG
15-30 LOZENGE BUCCAL
Status: DISCONTINUED | OUTPATIENT
Start: 2024-10-25 | End: 2024-10-27 | Stop reason: HOSPADM

## 2024-10-25 RX ORDER — SIMVASTATIN 40 MG
40 TABLET ORAL EVERY EVENING
Status: DISCONTINUED | OUTPATIENT
Start: 2024-10-25 | End: 2024-10-27 | Stop reason: HOSPADM

## 2024-10-25 RX ORDER — FLUTICASONE PROPIONATE 50 MCG
1 SPRAY, SUSPENSION (ML) NASAL DAILY PRN
COMMUNITY

## 2024-10-25 RX ORDER — IPRATROPIUM BROMIDE AND ALBUTEROL SULFATE 2.5; .5 MG/3ML; MG/3ML
3 SOLUTION RESPIRATORY (INHALATION)
Status: DISCONTINUED | OUTPATIENT
Start: 2024-10-25 | End: 2024-10-27 | Stop reason: HOSPADM

## 2024-10-25 RX ORDER — CETIRIZINE HYDROCHLORIDE 10 MG/1
10 TABLET ORAL DAILY
Status: DISCONTINUED | OUTPATIENT
Start: 2024-10-25 | End: 2024-10-27 | Stop reason: HOSPADM

## 2024-10-25 RX ORDER — ASPIRIN 81 MG/1
81 TABLET ORAL DAILY
Status: DISCONTINUED | OUTPATIENT
Start: 2024-10-25 | End: 2024-10-27 | Stop reason: HOSPADM

## 2024-10-25 RX ORDER — CALCIUM CARBONATE 500 MG/1
1000 TABLET, CHEWABLE ORAL 4 TIMES DAILY PRN
Status: DISCONTINUED | OUTPATIENT
Start: 2024-10-25 | End: 2024-10-27 | Stop reason: HOSPADM

## 2024-10-25 RX ORDER — AMOXICILLIN 250 MG
2 CAPSULE ORAL 2 TIMES DAILY PRN
Status: DISCONTINUED | OUTPATIENT
Start: 2024-10-25 | End: 2024-10-27 | Stop reason: HOSPADM

## 2024-10-25 RX ORDER — ONDANSETRON 4 MG/1
4 TABLET, ORALLY DISINTEGRATING ORAL EVERY 6 HOURS PRN
Status: DISCONTINUED | OUTPATIENT
Start: 2024-10-25 | End: 2024-10-27 | Stop reason: HOSPADM

## 2024-10-25 RX ORDER — ONDANSETRON 2 MG/ML
4 INJECTION INTRAMUSCULAR; INTRAVENOUS EVERY 6 HOURS PRN
Status: DISCONTINUED | OUTPATIENT
Start: 2024-10-25 | End: 2024-10-27 | Stop reason: HOSPADM

## 2024-10-25 RX ORDER — IPRATROPIUM BROMIDE AND ALBUTEROL SULFATE 2.5; .5 MG/3ML; MG/3ML
3 SOLUTION RESPIRATORY (INHALATION)
Status: COMPLETED | OUTPATIENT
Start: 2024-10-25 | End: 2024-10-25

## 2024-10-25 RX ORDER — LISINOPRIL 5 MG/1
10 TABLET ORAL DAILY
Status: DISCONTINUED | OUTPATIENT
Start: 2024-10-25 | End: 2024-10-27 | Stop reason: HOSPADM

## 2024-10-25 RX ORDER — DEXTROSE MONOHYDRATE 25 G/50ML
25-50 INJECTION, SOLUTION INTRAVENOUS
Status: DISCONTINUED | OUTPATIENT
Start: 2024-10-25 | End: 2024-10-27 | Stop reason: HOSPADM

## 2024-10-25 RX ORDER — CEFTRIAXONE 1 G/1
1 INJECTION, POWDER, FOR SOLUTION INTRAMUSCULAR; INTRAVENOUS EVERY 24 HOURS
Status: DISCONTINUED | OUTPATIENT
Start: 2024-10-26 | End: 2024-10-26

## 2024-10-25 RX ORDER — LIDOCAINE 40 MG/G
CREAM TOPICAL
Status: DISCONTINUED | OUTPATIENT
Start: 2024-10-25 | End: 2024-10-27 | Stop reason: HOSPADM

## 2024-10-25 RX ADMIN — CEFTRIAXONE SODIUM 2 G: 2 INJECTION, POWDER, FOR SOLUTION INTRAMUSCULAR; INTRAVENOUS at 01:38

## 2024-10-25 RX ADMIN — SODIUM CHLORIDE 8 UNITS: 9 INJECTION, SOLUTION INTRAVENOUS at 00:43

## 2024-10-25 RX ADMIN — METHYLPREDNISOLONE SODIUM SUCCINATE 62.5 MG: 125 INJECTION, POWDER, FOR SOLUTION INTRAMUSCULAR; INTRAVENOUS at 08:05

## 2024-10-25 RX ADMIN — LEVALBUTEROL HYDROCHLORIDE 1.25 MG: 1.25 SOLUTION RESPIRATORY (INHALATION) at 00:07

## 2024-10-25 RX ADMIN — FAMOTIDINE 20 MG: 20 TABLET ORAL at 21:09

## 2024-10-25 RX ADMIN — IPRATROPIUM BROMIDE AND ALBUTEROL SULFATE 3 ML: .5; 3 SOLUTION RESPIRATORY (INHALATION) at 07:54

## 2024-10-25 RX ADMIN — CETIRIZINE HYDROCHLORIDE 10 MG: 10 TABLET, FILM COATED ORAL at 11:34

## 2024-10-25 RX ADMIN — LISINOPRIL 10 MG: 5 TABLET ORAL at 11:33

## 2024-10-25 RX ADMIN — INSULIN HUMAN 20 UNITS: 100 INJECTION, SUSPENSION SUBCUTANEOUS at 11:46

## 2024-10-25 RX ADMIN — AZITHROMYCIN MONOHYDRATE 500 MG: 500 INJECTION, POWDER, LYOPHILIZED, FOR SOLUTION INTRAVENOUS at 02:11

## 2024-10-25 RX ADMIN — METHYLPREDNISOLONE SODIUM SUCCINATE 125 MG: 125 INJECTION, POWDER, FOR SOLUTION INTRAMUSCULAR; INTRAVENOUS at 00:11

## 2024-10-25 RX ADMIN — UMECLIDINIUM BROMIDE AND VILANTEROL TRIFENATATE 1 PUFF: 62.5; 25 POWDER RESPIRATORY (INHALATION) at 11:35

## 2024-10-25 RX ADMIN — INSULIN ASPART 2 UNITS: 100 INJECTION, SOLUTION INTRAVENOUS; SUBCUTANEOUS at 02:26

## 2024-10-25 RX ADMIN — EMPAGLIFLOZIN 10 MG: 10 TABLET, FILM COATED ORAL at 21:09

## 2024-10-25 RX ADMIN — MAGNESIUM SULFATE HEPTAHYDRATE 2 G: 40 INJECTION, SOLUTION INTRAVENOUS at 00:12

## 2024-10-25 RX ADMIN — DOCUSATE SODIUM 100 MG: 100 CAPSULE, LIQUID FILLED ORAL at 21:09

## 2024-10-25 RX ADMIN — INSULIN DETEMIR 55 UNITS: 100 INJECTION, SOLUTION SUBCUTANEOUS at 21:12

## 2024-10-25 RX ADMIN — IPRATROPIUM BROMIDE AND ALBUTEROL SULFATE 3 ML: .5; 3 SOLUTION RESPIRATORY (INHALATION) at 16:23

## 2024-10-25 RX ADMIN — SIMVASTATIN 40 MG: 40 TABLET, FILM COATED ORAL at 21:09

## 2024-10-25 RX ADMIN — EMPAGLIFLOZIN 10 MG: 10 TABLET, FILM COATED ORAL at 11:34

## 2024-10-25 RX ADMIN — FAMOTIDINE 20 MG: 10 INJECTION, SOLUTION INTRAVENOUS at 02:14

## 2024-10-25 RX ADMIN — SODIUM CHLORIDE 1000 ML: 9 INJECTION, SOLUTION INTRAVENOUS at 01:00

## 2024-10-25 RX ADMIN — IPRATROPIUM BROMIDE AND ALBUTEROL SULFATE 3 ML: .5; 3 SOLUTION RESPIRATORY (INHALATION) at 12:39

## 2024-10-25 RX ADMIN — IPRATROPIUM BROMIDE AND ALBUTEROL SULFATE 3 ML: .5; 3 SOLUTION RESPIRATORY (INHALATION) at 03:06

## 2024-10-25 RX ADMIN — INSULIN ASPART 3 UNITS: 100 INJECTION, SOLUTION INTRAVENOUS; SUBCUTANEOUS at 21:11

## 2024-10-25 RX ADMIN — METHYLPREDNISOLONE SODIUM SUCCINATE 62.5 MG: 125 INJECTION, POWDER, FOR SOLUTION INTRAMUSCULAR; INTRAVENOUS at 17:07

## 2024-10-25 RX ADMIN — ASPIRIN 81 MG: 81 TABLET, COATED ORAL at 11:33

## 2024-10-25 ASSESSMENT — ENCOUNTER SYMPTOMS
VOMITING: 0
FEVER: 0
COUGH: 1
SHORTNESS OF BREATH: 1

## 2024-10-25 NOTE — PHARMACY-ADMISSION MEDICATION HISTORY
Pharmacist Admission Medication History    Admission medication history is complete. The information provided in this note is only as accurate as the sources available at the time of the update.    Information Source(s): Patient and CareEverywhere/SureScripts via in-person    Pertinent Information:   -- Patient is taking Jardiance 10 mg BID until current supply is used up before starting 25 mg prescription.   -- Stopped Flomax ~ month ago. Provider told patient is was ok to stop if it wasn't providing benefit -- no noticeable change since stopping.    Changes made to PTA medication list:  Added: None  Deleted: Arexvy, Lotrisone, B12, Gmzar, Mucinex, Actos, prednisone, sodium bicarbonate, Bactrim  Changed: Jardiance    Allergies reviewed with patient and updates made in EHR: yes    Medication History Completed By: Jennifer Lewis [facility] 10/25/2024 9:57 AM    PTA Med List   Medication Sig Note Last Dose/Taking    aspirin 81 MG EC tablet Take 1 tablet (81 mg) by mouth daily  10/24/2024 Morning    cetirizine (ZYRTEC) 10 MG tablet Take 1 tablet (10 mg) by mouth daily.  10/24/2024 Morning    fluticasone (FLONASE) 50 MCG/ACT nasal spray Spray 1 spray into both nostrils daily as needed for rhinitis or allergies.  Past Week    insulin detemir (LEVEMIR PEN) 100 UNIT/ML pen Inject 55 Units Subcutaneous at bedtime  10/23/2024 Bedtime    JARDIANCE 10 MG TABS tablet Take 10 mg by mouth 2 times daily. 10/25/2024: Has a new prescription for Jardiance 25 mg daily, but is using up 10 mg supply before starting (taking 10 mg BID) 10/24/2024 Morning    lisinopril (PRINIVIL,ZESTRIL) 10 MG tablet [LISINOPRIL (PRINIVIL,ZESTRIL) 10 MG TABLET] Take 10 mg by mouth daily.  10/24/2024 Morning    magnesium 250 MG tablet Take 250 mg by mouth at bedtime.  10/23/2024    metFORMIN (GLUCOPHAGE) 500 MG tablet [METFORMIN (GLUCOPHAGE) 500 MG TABLET] Take 1,000 mg by mouth 2 (two) times a day with meals.  10/24/2024 Morning    primidone (MYSOLINE) 50 MG  tablet [PRIMIDONE (MYSOLINE) 50 MG TABLET] Take 50 mg by mouth at bedtime.  10/23/2024 Bedtime    simvastatin (ZOCOR) 40 MG tablet Take 40 mg by mouth every evening  10/23/2024 Bedtime    umeclidinium-vilanterol (ANORO ELLIPTA) 62.5-25 MCG/ACT oral inhaler Inhale 1 puff into the lungs daily  10/24/2024

## 2024-10-25 NOTE — H&P
[facility]    History and Physical - Hospitalist Service       Date of Admission:  10/24/2024    Assessment & Plan   Kelechi Kendrick is an 79-year-old with a medical history significant for COPD, type II DM, hypertension, bladder cancer, former nicotine use and other medical comorbidities who is admitted with acute COPD exacerbation with hypoxemic and hypercapnic respiratory failure.      Patient Active Problem List   Diagnosis    DM type 2, Hgb A1C 8.7 on 5/11/19    Hypertension    Pulmonary emphysema, unspecified emphysema type (H)    COPD (chronic obstructive pulmonary disease) (H)    Bladder cancer (H)    Malignant neoplasm of urinary bladder, unspecified site (H)    Mixed hyperlipidemia    Nicotine dependence, cigarettes, in remission    COPD with acute exacerbation (H)    Poorly controlled type 2 diabetes mellitus (H)    Acute on chronic respiratory failure with hypoxia and hypercapnia (H)       1.   Acute Hypoxemic/ hypercapnic Respiratory Failure:    Etiology: Likely secondary to COPD exacerbation with potential contributing factors, including a history of allergies and possible dust exposure.  Treatment Plan:  Continue BiPAP as needed to support oxygenation.  Wean oxygen requirements as tolerated; currently on 3 L nasal cannula after weaning in the ER.  Continue steroids, ceftriaxone, and azithromycin for respiratory support.  Pulmonary consultation is pending to assist with further management.  VBG PRN. Improving    2.COPD Exacerbation:    Etiology: Likely precipitated by environmental exposure, infection, or allergens.  Treatment Plan:  Continue ceftriaxone, azithromycin, and Solu-Medrol to address inflammation and potential infection.  Monitor respiratory status closely and titrate oxygen and BiPAP as needed.  Appreciate pulmonary input for long-term management strategies and any additional interventions.    3.Type II Diabetes Mellitus with Hyperglycemia:    Etiology:  Chronic condition with acute hyperglycemia likely influenced by stress and steroid use.  Treatment Plan:  Implement sliding scale insulin with blood glucose monitoring four times daily.  Target blood sugar goal of 100-140 mg/dL.  Adjust insulin dosing based on blood glucose trends and reassess the need for adjustments in diabetic management as needed.    4.Hypertension:    Etiology: Essential hypertension requiring ongoing management.  Treatment Plan:  Monitor blood pressure closely, especially during acute illness.  Resume outpatient antihypertensive medications as tolerated once stabilized.    5. Hyperlipidemia:    Etiology: Chronic lipid imbalance requiring outpatient lipid management.  Treatment Plan:  Continue with current outpatient lipid-lowering therapy, with routine follow-up as an outpatient.    6.History of Bladder Cancer (status post-TURBT):    Etiology: Prior diagnosis with resection of bladder tumor.  Treatment Plan:  No immediate intervention needed during this admission.  Recommend outpatient urology follow-up to monitor for recurrence or additional intervention needs.    7. Medication Reconciliation:    Etiology: Medication list review pending to verify current and ongoing medications.  Treatment Plan:  Complete medication reconciliation on the following day by the day team.  Adjust or continue medications based on reconciliation findings.  Other Chronic Conditions:    Treatment Plan: Continue with current management plans for all stable chronic conditions without modifications at this time.    8. Monitoring and Follow-Up:    Oxygenation, respiratory effort, and blood glucose levels to be monitored closely.  Adjust respiratory support, diabetic care, and any additional needs based on the patient's response to treatment and clinical course.          Diet:  Diabetic diet  DVT Prophylaxis: Pneumatic Compression Devices  Buchanan Catheter: Not present  Lines: None     Cardiac Monitoring: None  Code Status:  "Full code      Clinically Significant Risk Factors Present on Admission                 # Drug Induced Platelet Defect: home medication list includes an antiplatelet medication   # Hypertension: Noted on problem list   # Acute Hypercapnic Respiratory Failure: based on venous blood gas results.  Continue supplemental oxygen and ventilatory support as indicated.       # DMII: A1C = 10.5 % (Ref range: <5.7 %) within past 6 months    # Obesity: Estimated body mass index is 30.79 kg/m  as calculated from the following:    Height as of this encounter: 1.651 m (5' 5\").    Weight as of this encounter: 83.9 kg (185 lb).       # COPD: noted on problem list        Disposition Plan     Medically Ready for Discharge: Anticipated in 2-4 Days           Argentina Alfaro MD  Hospitalist Service  [facility]  Securely message with [employer] (more info)  Text page via [employer] Paging/Directory     ______________________________________________________________________    Chief Complaint   Worsening shortness of breath        History of Present Illness   Kelechi Kendrick is an 79-year-old with a medical history significant for COPD, type II DM, hypertension, bladder cancer, former nicotine use and other medical comorbidities who is admitted with acute COPD exacerbation with hypoxemic and hypercapnic respiratory failure.        Per history, patient presented to the Emergency Department via EMS for evaluation of shortness of breath. The patient reported progressively worsening shortness of breath over the last two days, along with increased cough and nasal congestion over the past couple of weeks. He had a recent pulmonology visit on October 16, at which he was prescribed prednisone, sertraline, and Flonase, though he was uncertain if he started the prednisone as directed. He denied any chest pain, vomiting, fever, or additional symptoms.    Upon EMS arrival, the patient s oxygen saturation was in the 80s on room " air, which improved to 97% on a non-rebreather mask after receiving two DuoNeb treatments and Solu-Medrol. On ED arrival, he remained tachycardic with a heart rate of 140, respiratory rate in the high 20s, and blood pressure of 182/89. Lung auscultation revealed diffuse wheezes and increased work of breathing.    He was placed on BiPAP, which brought his respiratory rate down to 20, and he maintained an FiO2 of 35%, improving his pH from 7.23 to 7.33 and reducing his pCO2 from 70 to 52 after one hour. His white blood cell count was slightly elevated at 12.2, and blood glucose was markedly high at 516. A chest X-ray was unremarkable. The patient was given insulin to address hyperglycemia and received an additional steroid dose for respiratory support. Azithromycin 500 mg IV and Rocephin were initiated based on suspected respiratory infection.    Case was discussed with the intensivist on call and the ICU intensivist,  admission to the ICU for close monitoring and ongoing respiratory management. The patient remained stable on BiPAP with continued improvement in respiratory status.  CODE STATUS was verified at bedside.  Patient is a full code.    He reports that he has been told there is an allergic component of his emphysema.  He thinks he may have been exposed to a lot of dust in the past few weeks    Past Medical History    Past Medical History:   Diagnosis Date    Bladder cancer (H)     COPD (chronic obstructive pulmonary disease) (H)     Diabetes mellitus, type II (H)     Dupuytren contracture     HLD (hyperlipidemia)     Hyperlipidemia     Hypertension     Personal history of malignant neoplasm of bladder     Plantar fasciitis     right    Tremor     Umbilical hernia with obstruction     Urinary tract infection        Past Surgical History   Past Surgical History:   Procedure Laterality Date    CYSTOSCOPY, TRANSURETHRAL RESECTION (TUR) TUMOR BLADDER, COMBINED N/A 10/10/2023    Procedure: CYSTOSCOPY, BIPOLAR  TRANSURETHRAL RESECTION BLADDER TUMOR;  Surgeon: Derik Hernández MD;  Location: [facility] OR    CYSTOSCOPY, TRANSURETHRAL RESECTION (TUR) TUMOR BLADDER, COMBINED N/A 3/11/2024    Procedure: CYSTOSCOPY, TRANSURETHRAL RESECTION BLADDER TUMOR;  Surgeon: Derik Hernández MD;  Location: [facility] OR    HERNIA REPAIR      LEFT TRIGGER THUMB SURGICAL RELEASE Left 08/24/2010    Dr. Reddy    ORTHOPEDIC SURGERY         Prior to Admission Medications   Prior to Admission Medications   Prescriptions Last Dose Informant Patient Reported? Taking?   AREXVY injection   Yes No   BD PEN NEEDLE CALLI 2ND GEN 32G X 4 MM miscellaneous   Yes No   Sig: USE AS DIRECTED ONCE PER DAY   JARDIANCE 10 MG TABS tablet   Yes No   Sig: TAKE 1 TABLET BY MOUTH DAILY FOR DIABETES   JARDIANCE 10 MG TABS tablet   Yes No   Sig: TAKE 1 TABLET BY MOUTH DAILY FOR DIABETES   Patient not taking: Reported on 10/9/2024   acetaminophen (TYLENOL) 500 MG tablet   No No   Sig: Take 1-2 tablets (500-1,000 mg) by mouth every 6 hours as needed for mild pain   Patient not taking: Reported on 3/20/2024   albuterol (PROAIR HFA/PROVENTIL HFA/VENTOLIN HFA) 108 (90 Base) MCG/ACT inhaler   No No   Sig: Inhale 2 puffs into the lungs every 4 hours as needed for shortness of breath, wheezing or cough.   albuterol (PROVENTIL) (2.5 MG/3ML) 0.083% neb solution   No No   Sig: Take 1 vial (2.5 mg) by nebulization every 6 hours as needed for shortness of breath, wheezing or cough.   aspirin 81 MG EC tablet   No No   Sig: Take 1 tablet (81 mg) by mouth daily   blood glucose (NO BRAND SPECIFIED) test strip   Yes No   Sig: TEST THREE TIMES DAILY   blood glucose (NO BRAND SPECIFIED) test strip   Yes No   Sig: USE TO CHECK BLOOD SUGAR LEVELS THREE TIMES DAILY   Patient not taking: Reported on 10/9/2024   blood glucose monitoring (SOFTCLIX) lancets   Yes No   Sig: TEST THREE TIMES DAILY AS DIRECTED   Patient not taking: Reported on 10/9/2024   cetirizine (ZYRTEC) 10  MG tablet   No No   Sig: Take 1 tablet (10 mg) by mouth daily.   clotrimazole-betamethasone (LOTRISONE) 1-0.05 % external cream   Yes No   Sig: APPLY TOPICALLY TO THE AFFECTED AND SURROUNDING AREAS TWICE DAILY IN THE MORNING AND IN THE EVENING FOR 2 WEEKS   cyanocobalamin (VITAMIN B-12) 1000 MCG tablet   Yes No   Sig: Take 1,000 mcg by mouth daily.   fluticasone (FLONASE) 50 MCG/ACT nasal spray   No No   Sig: Spray 1 spray into both nostrils daily.   gemcitabine (GEMZAR) 2 G/52.6 ML injection   Yes No   Sig: inject 2 grams intravesically   guaiFENesin (MUCINEX) 600 MG 12 hr tablet   No No   Sig: Take 2 tablets (1,200 mg) by mouth 2 times daily.   insulin detemir (LEVEMIR PEN) 100 UNIT/ML pen   Yes No   Sig: Inject 55 Units Subcutaneous at bedtime   lisinopril (PRINIVIL,ZESTRIL) 10 MG tablet   Yes No   Sig: [LISINOPRIL (PRINIVIL,ZESTRIL) 10 MG TABLET] Take 10 mg by mouth daily.   magnesium 250 MG tablet   Yes No   Si tablet with a meal Orally Once a day   metFORMIN (GLUCOPHAGE) 500 MG tablet   Yes No   Sig: [METFORMIN (GLUCOPHAGE) 500 MG TABLET] Take 1,000 mg by mouth 2 (two) times a day with meals.   pioglitazone (ACTOS) 15 MG tablet   Yes No   Sig: Take 15 mg by mouth daily   predniSONE (DELTASONE) 20 MG tablet   No No   Sig: Take 2 tabs daily x 5 days   Patient not taking: Reported on 10/9/2024   primidone (MYSOLINE) 50 MG tablet   Yes No   Sig: [PRIMIDONE (MYSOLINE) 50 MG TABLET] Take 50 mg by mouth at bedtime.   simvastatin (ZOCOR) 40 MG tablet   Yes No   Sig: Take 40 mg by mouth every evening   sodium bicarbonate 650 MG tablet   Yes No   Sig: TAKE 2 TABLET BY MOUTH IN EVENING PRIOR TO AND MORNING OF TREATMENT FOR 3 TREATMENTS   sulfamethoxazole-trimethoprim (BACTRIM DS) 800-160 MG tablet   Yes No   Sig: Take 1 tablet by mouth 2 times daily.   tamsulosin (FLOMAX) 0.4 MG capsule   Yes No   Sig: Take 0.4 mg by mouth daily.   umeclidinium-vilanterol (ANORO ELLIPTA) 62.5-25 MCG/ACT oral inhaler   No No   Sig:  Inhale 1 puff into the lungs daily      Facility-Administered Medications: None        Review of Systems    The 10 point Review of Systems is negative other than noted in the HPI or here.     Social History   I have reviewed this patient's social history and updated it with pertinent information if needed.  Social History     Tobacco Use    Smoking status: Former     Current packs/day: 0.40     Average packs/day: 0.4 packs/day for 30.0 years (12.0 ttl pk-yrs)     Types: Cigarettes    Smokeless tobacco: Never   Substance Use Topics    Alcohol use: No    Drug use: No         Family History         Allergies   Allergies   Allergen Reactions    Tdvax [Tetanus-Diphtheria Toxoids Td]      Other reaction(s): swelling    Tetanus Toxoid, Adsorbed [Tetanus Toxoid] Swelling    Naproxen Rash     Other reaction(s): rash, nausea        Physical Exam   Vital Signs: Temp: 98.4  F (36.9  C) Temp src: Oral BP: (!) 154/75 Pulse: 119   Resp: 17 SpO2: 98 % O2 Device: High Flow Nasal Cannula (HFNC)    Weight: 185 lbs 0 oz      General Aox3, appropriate affect, NAD, on 3L, transitioned for BIPAP  HEENT  dry MM, EOMI, PERRL  Chest decreased A/E with increased  A/E b/l  compliant with BIPAP b/l, + wheezing  Heart RRR, No M/R/G  Abd- Soft, NT, BS+  - Deferred,   Extremity- Moving all extremities, No digital clubbing,   No edema  Neuro- Aox3, grossly non focal, gait not checked  Skin  Has no tattoo, No skin rash     Medical Decision Making       85 MINUTES SPENT BY ME on the date of service doing chart review, history, exam, documentation & further activities per the note.      ------------------ MEDICAL DECISION MAKING ------------------------------------------------------------------------------------------------------  MANAGEMENT DISCUSSED with the following over the past 24 hours: patient and care team       Data   ------------------------- PAST 24 HR DATA REVIEWED -----------------------------------------------    I have personally  reviewed the following data over the past 24 hrs:    12.2 (H)  \   15.7   / 210     139 98 14.5 /  254 (H)   5.1 29 0.88 \     Trop: 14 BNP: <36     TSH: N/A T4: N/A A1C: 10.5 (H)     Procal: N/A CRP: N/A Lactic Acid: 2.0       INR:  N/A PTT:  N/A   D-dimer:  <0.27 Fibrinogen:  N/A       Imaging results reviewed over the past 24 hrs:   Recent Results (from the past 24 hours)   XR Chest Port 1 View    Narrative    EXAM: XR CHEST PORT 1 VIEW  LOCATION: [facility]  DATE: 10/25/2024    INDICATION: Dyspnea.  COMPARISON: 8/14/2024.      Impression    IMPRESSION: No acute airspace consolidation. Bibasilar interstitial prominence, not significantly changed from prior. No pleural effusion or pneumothorax.    Heart size is normal. Atherosclerotic calcifications of the thoracic aorta.

## 2024-10-25 NOTE — PROGRESS NOTES
[facility]    Medicine Progress Note - Hospitalist Service    Date of Admission:  10/24/2024    Assessment & Plan   Kelechi Kendrick is an 79-year-old with a medical history significant for COPD, type II DM, hypertension, bladder cancer, former nicotine use and other medical comorbidities who is admitted with acute COPD exacerbation with hypoxemic and hypercapnic respiratory failure.      #Acute hypoxemic and hypercapnic respiratory failure  #COPD with acute exacerbation  Presenting with worsening SOB and cough over about 3 days, found to have diffuse wheezing consistent with COPD exacerbation. VBG showing elevated pCO2. Improved with BiPAP in ED then weaned to NC.  - Pulmonology consulted; follows in clinic  - Continue Solumedrol 62mg q8h IV; de-escalate per pulm  - Duonebs q4h while awake  - Continue home inhalers  - Continue CTX and azithromycin    #Type II Diabetes Mellitus with Hyperglycemia  Quite insulin resistant. Exacerbated by steroids  - NPH 20u this morning  - Resume home Lantus 55u at bedtime  - sliding scale insulin  -Continue home statin  - Continue home Jardiance    #Hypertension  - Continue home lisinopril    #History of Bladder Cancer (status post-TURBT)  - OP Urology follow up            Diet: Low Consistent Carb (45 g CHO per Meal) Diet    DVT Prophylaxis: Pneumatic Compression Devices  Buchanan Catheter: Not present  Lines: None     Cardiac Monitoring: ACTIVE order. Indication: Tachyarrhythmias, acute (48 hours)  Code Status: Full Code      Clinically Significant Risk Factors Present on Admission                 # Drug Induced Platelet Defect: home medication list includes an antiplatelet medication   # Hypertension: Noted on problem list   # Acute Hypercapnic Respiratory Failure: based on venous blood gas results.  Continue supplemental oxygen and ventilatory support as indicated.       # DMII: A1C = 10.5 % (Ref range: <5.7 %) within past 6 months    # Overweight:  "Estimated body mass index is 29.87 kg/m  as calculated from the following:    Height as of this encounter: 1.651 m (5' 5\").    Weight as of this encounter: 81.4 kg (179 lb 8 oz).       # COPD: noted on problem list        Disposition Plan     Medically Ready for Discharge: Anticipated in 2-4 Days             HEMA TRACY MD  Hospitalist Service  [facility]  Securely message with [employer] (more info)  Text page via [employer] Paging/Directory   ______________________________________________________________________    Interval History   Breathing is doing much better. On 3L O2.    Physical Exam   Vital Signs: Temp: 97.3  F (36.3  C) Temp src: Oral BP: 128/70 Pulse: 105   Resp: 20 SpO2: 97 % O2 Device: Nasal cannula Oxygen Delivery: 1 LPM  Weight: 179 lbs 8 oz    General: No overt distress, conversational, non-toxic appearing  HEENT: MMM  Pulmonary: Bilateral wheezes and poor air movement, no crackles or rhonchi  Cardiac: RRR. No m/r/g  Abdomen: Soft. NT, ND.  Extremities: No bilateral LE edema  Neuro: alert and awake, Ox4      Medical Decision Making       35 MINUTES SPENT BY ME on the date of service doing chart review, history, exam, documentation & further activities per the note.      Data     I have personally reviewed the following data over the past 24 hrs:    10.8  \   14.7   / 179     141 103 15.3 /  448 (H)   5.3 26 0.75 \     ALT: 15 AST: 11 AP: 107 TBILI: 0.2   ALB: 4.0 TOT PROTEIN: 7.1 LIPASE: N/A     Trop: 14 BNP: <36     TSH: N/A T4: N/A A1C: 10.5 (H)     Procal: N/A CRP: N/A Lactic Acid: 2.0       INR:  N/A PTT:  N/A   D-dimer:  <0.27 Fibrinogen:  N/A       Imaging results reviewed over the past 24 hrs:   Recent Results (from the past 24 hours)   XR Chest Port 1 View    Narrative    EXAM: XR CHEST PORT 1 VIEW  LOCATION: [facility]  DATE: 10/25/2024    INDICATION: Dyspnea.  COMPARISON: 8/14/2024.      Impression    IMPRESSION: No acute airspace " consolidation. Bibasilar interstitial prominence, not significantly changed from prior. No pleural effusion or pneumothorax.    Heart size is normal. Atherosclerotic calcifications of the thoracic aorta.

## 2024-10-25 NOTE — PLAN OF CARE
"  Problem: Adult Inpatient Plan of Care  Goal: Patient-Specific Goal (Individualized)  Description: You can add care plan individualizations to a care plan. Examples of Individualization might be:  \"Parent requests to be called daily at 9am for status\", \"I have a hard time hearing out of my right ear\", or \"Do not touch me to wake me up as it startles  me\".  Outcome: Progressing     Problem: Adult Inpatient Plan of Care  Goal: Absence of Hospital-Acquired Illness or Injury  Outcome: Progressing     Problem: Adult Inpatient Plan of Care  Goal: Optimal Comfort and Wellbeing  Outcome: Progressing     Problem: Comorbidity Management  Goal: Maintenance of COPD Symptom Control  Outcome: Progressing     Problem: Skin Injury Risk Increased  Goal: Skin Health and Integrity  Outcome: Progressing     Problem: Gas Exchange Impaired  Goal: Optimal Gas Exchange  Outcome: Progressing                         "

## 2024-10-25 NOTE — PROGRESS NOTES
Pt transferred in stable condition to room 113 via WC. All meds and belongings sent with patient. Phone report given to charge RN on P1.

## 2024-10-25 NOTE — ED NOTES
"Patient arrived to ED via EMS, already receiving DuoNeb tx x2. Marked respiratory distress, resp rate in the high 30s. Sp02 low 90s. BS=coarse crackles, exp wheezes; harsh, congested, non-productive. Immediately placed on BiPAP at RR16 15/5cmH20 35%. Patient's WOB immediately improved on BiPAP. Xopenex tx x1 give as well. VBG done showed pH of 7.23 and PC02 75. Will closely monitor.     Addendum at 0010: ABG drawn on above BiPAP settings as follows: 7.33/52/145/27/98. Patient a lot more comfortable, wanting to have a break from the BiPAP. Will provide a break around 0230.     Addendum at 0305: Patient awake, alert, and oriented. No longer in resp distress. Taken off of the BiPAP and placed on 3LPM oxymask.     Addendum at 0350: Patient transported to ICU, room 342,  on 3LPM oxymask. BiPAP on standby.       BP (!) 154/75   Pulse 119   Temp 98.4  F (36.9  C) (Oral)   Resp 17   Ht 1.651 m (5' 5\")   Wt 83.9 kg (185 lb)   SpO2 98%   BMI 30.79 kg/m       Mo Donovan, RRT  "

## 2024-10-25 NOTE — ED TRIAGE NOTES
SOB x 2 days, mid 80's on RA, duoneb x 2 in route, 125mg solumedrol     Triage Assessment (Adult)       Row Name 10/24/24 [id number]          Triage Assessment    Airway WDL WDL     Additional Documentation Breath Sounds (Group)        Respiratory WDL    Respiratory WDL X;rhythm/pattern;cough     Rhythm/Pattern, Respiratory shortness of breath;tachypneic     Cough Frequency frequent     Cough Type congested;nonproductive        Breath Sounds    Breath Sounds All Fields     All Lung Fields Breath Sounds Anterior:;Posterior:;wheezes, inspiratory;wheezes, expiratory        Skin Circulation/Temperature WDL    Skin Circulation/Temperature WDL WDL        Cardiac WDL    Cardiac WDL X;rhythm     Pulse Rate & Regularity tachycardic        Peripheral/Neurovascular WDL    Peripheral Neurovascular WDL WDL        Cognitive/Neuro/Behavioral WDL    Cognitive/Neuro/Behavioral WDL WDL        Mahesh Coma Scale    Best Eye Response 4-->(E4) spontaneous     Best Motor Response 6-->(M6) obeys commands     Best Verbal Response 5-->(V5) oriented     Steger Coma Scale Score 15

## 2024-10-25 NOTE — CONSULTS
Pulmonary/Critical Care Consult Team Note    Kelechi Kendrick,  1945, MRN [id number]  Admitting Dx: COPD with acute exacerbation (H) [J44.1]  Poorly controlled type 2 diabetes mellitus (H) [E11.65]  Acute on chronic respiratory failure with hypoxia and hypercapnia (H) [J96.21, J96.22]  Date / Time of Admission:  10/24/2024 11:39 PM    Recent Events:  Intake/Output last 3 shifts:  I/O last 3 completed shifts:  In: -   Out: 700 [Urine:700]    He is feeling much better this morning  Off the bipap and laying in bed with no resp distress  His daughter is at the bedside  He coughs a lot - not productive  He was recently seen in clinic 10/9 by MANPREET [place]    Strep PNA - pending  Legionella - pending  Sputum Culture - pending  Resp Viral PCR - pending  Elevated Eos 13% on admission, sending IgE    Assessment/Plan: Kelechi Kendrick is a 79 year old male with PMHx of COPD, type II DM, hypertension, bladder cancer who presents with acute COPD exacerbation causing acute resp failure with hypoxia.    Acute COPD exacerbation causing hypoxic resp failure  - Steroids: solumedrol 60q8  - bronchodilators: Duoneb 4 times daily while awake  - Bronchial Hygiene: Flutter valve   - Antibiotics:  Ceftriaxone and azithromycin  - continue home meds Zyrtec, Flonase, home inhaler Anoro Ellipta    He follows with Dr. Bravo in Lung clinic, alerted clinic and they will schedule a follow up appointment    Medical Care Time excluding procedures and family discussions greater than: 45 Minutes    Risk Factors Present on Admission:  Clinically Significant Risk Factors Present on Admission                 # Drug Induced Platelet Defect: home medication list includes an antiplatelet medication   # Hypertension: Noted on problem list   # Acute Hypercapnic Respiratory Failure: based on venous blood gas results.  Continue supplemental oxygen and ventilatory support as indicated.       # DMII: A1C = 10.5 % (Ref range: <5.7 %) within past 6  "months    # Overweight: Estimated body mass index is 29.87 kg/m  as calculated from the following:    Height as of this encounter: 1.651 m (5' 5\").    Weight as of this encounter: 81.4 kg (179 lb 8 oz).       # COPD: noted on problem list              Sherri Gibbons DO  Pulmonary and Critical Care Attending  pgr [phone]    Allergies   Allergen Reactions    Tdvax [Tetanus-Diphtheria Toxoids Td]      Other reaction(s): swelling    Tetanus Toxoid, Adsorbed [Tetanus Toxoid] Swelling    Naproxen Rash     Other reaction(s): rash, nausea       Meds: See MAR    Physical Exam:  /63   Pulse 108   Temp 98.3  F (36.8  C) (Oral)   Resp 20   Ht 1.651 m (5' 5\")   Wt 81.4 kg (179 lb 8 oz)   SpO2 98%   BMI 29.87 kg/m    Intake/Output this shift:  I/O this shift:  In: -   Out: 450 [Urine:450]  GEN: sitting up in bed, NAD  HEENT: MMM, NC in place  CVS: regular rhythm, no murmurs  RESP: bilateral rhonchi, no wheezing  ABD: Soft, No abdominal pain with palpation, no guarding, no rigidity  EXT: Warm, well perfused, trace LE edema  NEURO: moving all extremities, nonfocal  PSYCH: pleasant    Pertinent Labs: Latest lab results in EHR personally reviewed.   CMP  Recent Labs   Lab 10/25/24  0804 10/25/24  0552 10/25/24  0427 10/25/24  0409 10/25/24  0226 10/24/24  2350   NA  --   --  141  --   --  139   POTASSIUM  --   --  5.3  --   --  5.1   CHLORIDE  --   --  103  --   --  98   CO2  --   --  26  --   --  29   ANIONGAP  --   --  12  --   --  12   * 367* 361* 314*   < > 516*   BUN  --   --  15.3  --   --  14.5   CR  --   --  0.75  --   --  0.88   GFRESTIMATED  --   --  >90  --   --  87   RODRIGUEZ  --   --  8.7*  --   --  9.2   MAG  --   --   --   --   --  2.4*   PROTTOTAL  --   --  7.1  --   --   --    ALBUMIN  --   --  4.0  --   --   --    BILITOTAL  --   --  0.2  --   --   --    ALKPHOS  --   --  107  --   --   --    AST  --   --  11  --   --   --    ALT  --   --  15  --   --   --     < > = values in this interval not " displayed.     CBC  Recent Labs   Lab 10/25/24  0427 10/24/24  2350   WBC 10.8 12.2*   RBC 5.03 5.39   HGB 14.7 15.7   HCT 47.1 50.7   MCV 94 94   MCH 29.2 29.1   MCHC 31.2* 31.0*   RDW 13.4 13.3    210     INRNo lab results found in last 7 days.  Arterial Blood Gas  Recent Labs   Lab 10/25/24  0430 10/25/24  0059 10/24/24  2350   PH  --  7.33*  --    PCO2  --  52*  --    PO2  --  145*  --    HCO3  --  27  --    O2PER 28 35 21       Cultures: not yet available.      Imaging: personally reviewed.   Results for orders placed during the hospital encounter of 10/24/24    XR Chest Port 1 View    Narrative  EXAM: XR CHEST PORT 1 VIEW  LOCATION: [facility]  DATE: 10/25/2024    INDICATION: Dyspnea.  COMPARISON: 8/14/2024.    Impression  IMPRESSION: No acute airspace consolidation. Bibasilar interstitial prominence, not significantly changed from prior. No pleural effusion or pneumothorax.    Heart size is normal. Atherosclerotic calcifications of the thoracic aorta.    Patient Active Problem List   Diagnosis    DM type 2, Hgb A1C 8.7 on 5/11/19    Hypertension    Pulmonary emphysema, unspecified emphysema type (H)    COPD (chronic obstructive pulmonary disease) (H)    Bladder cancer (H)    Malignant neoplasm of urinary bladder, unspecified site (H)    Mixed hyperlipidemia    Nicotine dependence, cigarettes, in remission    COPD with acute exacerbation (H)    Poorly controlled type 2 diabetes mellitus (H)    Acute on chronic respiratory failure with hypoxia and hypercapnia (H)         Surgical History  He  has a past surgical history that includes hernia repair; orthopedic surgery; Cystoscopy, transurethral resection (TUR) tumor bladder, combined (N/A, 10/10/2023); LEFT TRIGGER THUMB SURGICAL RELEASE (Left, 08/24/2010); and Cystoscopy, transurethral resection (TUR) tumor bladder, combined (N/A, 3/11/2024).    Family History  Reviewed, and family history is not on file.    Social  History  Reviewed, and he  reports that he has quit smoking. His smoking use included cigarettes. He has a 12 pack-year smoking history. He has never used smokeless tobacco. He reports that he does not drink alcohol and does not use drugs.    Allergies  Allergies   Allergen Reactions    Tdvax [Tetanus-Diphtheria Toxoids Td]      Other reaction(s): swelling    Tetanus Toxoid, Adsorbed [Tetanus Toxoid] Swelling    Naproxen Rash     Other reaction(s): rash, nausea              Sherri Gibbons, DO  Pulmonary and Critical Care Attending  [facility] [phone]    Securely message with the Vocera Web Console (learn more here)

## 2024-10-25 NOTE — ED NOTES
"Bed: JNED-18  Expected date: 10/24/24  Expected time: 11:34 PM  Means of arrival: Ambulance  Comments:  WBL  79 M--SOB x 2 days, 80% RA, duoneb, solumedrol, 10 L\NC 98%, blood sugar \"high\"  "

## 2024-10-25 NOTE — ED PROVIDER NOTES
EMERGENCY DEPARTMENT ENCOUNTER      NAME: Kelechi Kendrick  AGE: 79 year old male  YOB: 1945  MRN: [id number]  EVALUATION DATE & TIME: 10/24/2024 11:39 PM    PCP: Ramiro Person    ED PROVIDER: Juanjo Hassan M.D.    Chief Complaint   Patient presents with    Shortness of Breath     SOB x 2 days, mid 80's on RA, duoneb x 2 in route, 125mg solumedrol     FINAL IMPRESSION:  1. Acute on chronic respiratory failure with hypoxia and hypercapnia (H)    2. COPD with acute exacerbation (H)    3. Poorly controlled type 2 diabetes mellitus (H)      ED COURSE & MEDICAL DECISION MAKING:    Pertinent Labs & Imaging studies independently interpreted by me. (See chart for details)  Reviewed most recent pulmonology visit from October 9 which was follow-up for mixed COPD, at that time patient was having acute symptoms of increased nasal congestion, cough which been going on for couple of weeks, failed his action plan, patient was started on sertraline and Flonase at that time also switch to albuterol nebulizer.    ED Course as of 10/25/24 0218   Thu Oct 24, 2024   2350 I met with the patient to gather history and perform my exam. ED course and treatment discussed.    2350 EKG:    Independently reviewed and interpreted by me  Performed at: 11:47 PM  Impression: Sinus tachycardia with right axis deviation  Rate: 136  Rhythm: Sinus  Axis: Right   WI Interval: 158  QRS Interval: 90  QTc Interval: 433  ST Changes: No acute ischemic changes  Comparison: August 2024, rate suhail increased   2353 Patient seen and examined, presents with EMS for shortness of breath.  Patient has a history of COPD and has had increased shortness of breath for couple weeks, worse in the last couple of days.  Was recently seen by pulmonology and was started on prednisone as well as Flonase and sertraline.  On exam here, patient is tachycardic, did receive 2 DuoNeb's and route by EMS.  Patient started on BiPAP on arrival, appears  comfortable on this, marked inspiratory and expiratory wheezes diffusely.  Magnesium and Solu-Medrol will be ordered, due to marked tachycardia, consider Xopenex instead of albuterol for next round of nebulizer treatments.  Labs and x-ray are ordered.   Fri Oct 25, 2024   0008 Labs independently interpreted by me with venous pH 7.23 and pCO2 75.  Will recheck after trial on BiPAP but low threshold for intubation.  Updated patient with findings and discussed intubation, he is agreeable to this if needed.  RT at bedside and we discussed plan.   0015 Lab reports glucose 516, bicarb on VBG is 31, do not suspect DKA and hyperglycemia may be related to recent steroid use.   0018 Labs independently interpreted by me with negative D-dimer, elevated magnesium of 2.4, reassuring basic panel other than hyperglycemia   0041 Labs independently interpreted by me with normal lactate, mild leukocytosis which may be related to prednisone use, negative BNP.  Hemoglobin A1c 10.5 consistent with poor diabetic control.   0044 Chest x-ray independently interpreted by me with atelectasis in the left base, no pleural effusion, hemothorax, pneumothorax.   0052 Reviewed radiology interpretation of chest x-ray which agrees with my initial interpretation   0058 Blood pressure improved but patient remains quite tachycardic, IV fluids initiated   0113 Repeat blood gas significantly improved with pH of 7.33 and pCO2 52.   0115 Patient rechecked, remains comfortable, still quite tachycardic.  Current vent settings 15/535% with tidal volumes 6    0125 I spoke with Dr. Rock, Intensivist, who agrees with ICU placement for patient, hospitalist primary with intensivist consulting.  Recommend starting antibiotics for community-acquired pneumonia, Rocephin and azithromycin are ordered.   0135 I discussed the patient with Dr. Alfaro from the hospitalist service who agrees with plan for ICU admission.   0217 Patient remains tachycardic but otherwise  vitally stable in the department on BiPAP.  Rechecked, no respiratory distress and remains comfortable     At the conclusion of the encounter I discussed the results of all of the tests and the disposition. The questions were answered. The patient or family acknowledged understanding and was agreeable with the care plan.     Medical Decision Making  Obtained supplemental history:Supplemental history obtained?: Documented in chart and EMS  Reviewed external records: External records reviewed?: Documented in chart and Outpatient Record: Pulmonology visit on 10/9/2024  Care impacted by chronic illness:Chronic Lung Disease  Did you consider but not order tests?: Work up considered but not performed and documented in chart, if applicable  Did you interpret images independently?: Independent interpretation of ECG and images noted in documentation, when applicable.  Consultation discussion with other provider:Did you involve another provider (consultant, , pharmacy, etc.)?: I discussed the care with another health care provider, see documentation for details.  Admit.    PROCEDURES:     Critical Care   Performed by: Dr Juanjo Hassan  Total critical care time: 140 minutes  Critical care was necessary to treat or prevent imminent or life-threatening deterioration of the following conditions: Acute hypoxic and hypercapnic respiratory failure, BiPAP  Critical care was time spent personally by me on the following activities: development of treatment plan with patient or surrogate, discussions with consultants, examination of patient, evaluation of patient's response to treatment, obtaining history from patient or surrogate, ordering and performing treatments and interventions, ordering and review of laboratory studies, ordering and review of radiographic studies, re-evaluation of patient's condition and monitoring for potential decompensation.  Critical care time was exclusive of separately billable procedures and treating  other patients.      MEDICATIONS GIVEN IN THE EMERGENCY:  Medications   levalbuterol (XOPENEX) neb solution 1.25 mg (1.25 mg Nebulization $Given 10/25/24 0007)   azithromycin (ZITHROMAX) 500 mg in sodium chloride 0.9 % 250 mL intermittent infusion (500 mg Intravenous $New Bag 10/25/24 0211)   lidocaine 1 % 0.1-1 mL (has no administration in time range)   lidocaine (LMX4) cream (has no administration in time range)   sodium chloride (PF) 0.9% PF flush 3 mL (3 mLs Intracatheter Not Given 10/25/24 0141)   sodium chloride (PF) 0.9% PF flush 3 mL (has no administration in time range)   senna-docusate (SENOKOT-S/PERICOLACE) 8.6-50 MG per tablet 1 tablet (has no administration in time range)     Or   senna-docusate (SENOKOT-S/PERICOLACE) 8.6-50 MG per tablet 2 tablet (has no administration in time range)   calcium carbonate (TUMS) chewable tablet 1,000 mg (has no administration in time range)   docusate sodium (COLACE) capsule 100 mg (has no administration in time range)   ondansetron (ZOFRAN ODT) ODT tab 4 mg (has no administration in time range)     Or   ondansetron (ZOFRAN) injection 4 mg (has no administration in time range)   famotidine (PEPCID) injection 20 mg (20 mg Intravenous $Given 10/25/24 0214)   cefTRIAXone (ROCEPHIN) 1 g vial to attach to  mL bag for ADULTS or NS 50 mL bag for PEDS (has no administration in time range)   azithromycin (ZITHROMAX) 500 mg in sodium chloride 0.9 % 250 mL intermittent infusion (has no administration in time range)   ipratropium - albuterol 0.5 mg/2.5 mg/3 mL (DUONEB) neb solution 3 mL (has no administration in time range)   ipratropium - albuterol 0.5 mg/2.5 mg/3 mL (DUONEB) neb solution 3 mL (has no administration in time range)   methylPREDNISolone Na Suc (solu-MEDROL) injection 62.5 mg (has no administration in time range)   glucose gel 15-30 g (has no administration in time range)     Or   dextrose 50 % injection 25-50 mL (has no administration in time range)     Or    glucagon injection 1 mg (has no administration in time range)   insulin aspart (NovoLOG) injection (RAPID ACTING) (has no administration in time range)   insulin aspart (NovoLOG) injection (RAPID ACTING) (has no administration in time range)   methylPREDNISolone Na Suc (solu-MEDROL) injection 125 mg (125 mg Intravenous $Given 10/25/24 0011)   insulin regular 1 unit/mL injection 8 Units (8 Units Intravenous $Given 10/25/24 0043)   sodium chloride 0.9% BOLUS 1,000 mL (0 mLs Intravenous Stopped 10/25/24 0212)   cefTRIAXone (ROCEPHIN) 2 g vial to attach to  ml bag for ADULTS or NS 50 ml bag for PEDS (0 g Intravenous Stopped 10/25/24 0212)       NEW PRESCRIPTIONS STARTED AT TODAY'S ER VISIT  New Prescriptions    No medications on file       =================================================================    HPI    Patient information was obtained from: Patient and EMS      Kelechi Kendrick is a 79 year old male with a pertinent history of COPD, type 2 diabetes, hypertension, bladder cancer, former tobacco use who presents to this ED via EMS for evaluation of shortness of breath    The patient reports increasing shortness of breath and cough over the last few days.  The patient is unsure if he started his prescribed prednisone from his recent pulmonology visit.  The patient denies any chest pain, vomiting, fever, or any other symptoms or complaints    EMS reports the patient received 2 DuoNebs and Solu-Medrol en route      REVIEW OF SYSTEMS   Review of Systems   Constitutional:  Negative for fever.   Respiratory:  Positive for cough and shortness of breath.    Cardiovascular:  Negative for chest pain.   Gastrointestinal:  Negative for vomiting.   All other systems reviewed and are negative.     All other systems reviewed and negative    PAST MEDICAL HISTORY:  Past Medical History:   Diagnosis Date    Bladder cancer (H)     COPD (chronic obstructive pulmonary disease) (H)     Diabetes mellitus, type II (H)      Dupuytren contracture     HLD (hyperlipidemia)     Hyperlipidemia     Hypertension     Personal history of malignant neoplasm of bladder     Plantar fasciitis     right    Tremor     Umbilical hernia with obstruction     Urinary tract infection        PAST SURGICAL HISTORY:  Past Surgical History:   Procedure Laterality Date    CYSTOSCOPY, TRANSURETHRAL RESECTION (TUR) TUMOR BLADDER, COMBINED N/A 10/10/2023    Procedure: CYSTOSCOPY, BIPOLAR TRANSURETHRAL RESECTION BLADDER TUMOR;  Surgeon: Derik Hernández MD;  Location: [facility] OR    CYSTOSCOPY, TRANSURETHRAL RESECTION (TUR) TUMOR BLADDER, COMBINED N/A 3/11/2024    Procedure: CYSTOSCOPY, TRANSURETHRAL RESECTION BLADDER TUMOR;  Surgeon: Derik Hernández MD;  Location: [facility] OR    HERNIA REPAIR      LEFT TRIGGER THUMB SURGICAL RELEASE Left 08/24/2010    Dr. Reddy    ORTHOPEDIC SURGERY         CURRENT MEDICATIONS:    Current Facility-Administered Medications   Medication Dose Route Frequency Provider Last Rate Last Admin    azithromycin (ZITHROMAX) 500 mg in sodium chloride 0.9 % 250 mL intermittent infusion  500 mg Intravenous Once Juanjo Hsasan MD   500 mg at 10/25/24 0211    [START ON 10/26/2024] azithromycin (ZITHROMAX) 500 mg in sodium chloride 0.9 % 250 mL intermittent infusion  500 mg Intravenous Q24H Argentina Alfaro MD        calcium carbonate (TUMS) chewable tablet 1,000 mg  1,000 mg Oral 4x Daily PRN Argentina Alfaro MD        [START ON 10/26/2024] cefTRIAXone (ROCEPHIN) 1 g vial to attach to  mL bag for ADULTS or NS 50 mL bag for PEDS  1 g Intravenous Q24H Argentina Alfaro MD        glucose gel 15-30 g  15-30 g Oral Q15 Min PRN Argentina Alfaro MD        Or    dextrose 50 % injection 25-50 mL  25-50 mL Intravenous Q15 Min PRN Argentina Alfaro MD        Or    glucagon injection 1 mg  1 mg Subcutaneous Q15 Min PRN Argentina Alfaro MD        docusate sodium (COLACE) capsule 100 mg  100 mg Oral BID Argentina Alfaro MD         famotidine (PEPCID) injection 20 mg  20 mg Intravenous Q12H Argentina Alfaro MD   20 mg at 10/25/24 0214    insulin aspart (NovoLOG) injection (RAPID ACTING)  1-7 Units Subcutaneous TID AC Argentina Alfaro MD        insulin aspart (NovoLOG) injection (RAPID ACTING)  1-5 Units Subcutaneous At Bedtime Argentina Alfaro MD        ipratropium - albuterol 0.5 mg/2.5 mg/3 mL (DUONEB) neb solution 3 mL  3 mL Nebulization Q4H Argentina Alfaro MD        ipratropium - albuterol 0.5 mg/2.5 mg/3 mL (DUONEB) neb solution 3 mL  3 mL Nebulization Q2H PRN Argentina Alfaro MD        levalbuterol (XOPENEX) neb solution 1.25 mg  1.25 mg Nebulization Q1H PRN Juanjo Hassan MD   1.25 mg at 10/25/24 0007    lidocaine (LMX4) cream   Topical Q1H PRN Argentina Alfaro MD        lidocaine 1 % 0.1-1 mL  0.1-1 mL Other Q1H PRN Argentina Alfaro MD        methylPREDNISolone Na Suc (solu-MEDROL) injection 62.5 mg  62.5 mg Intravenous Q8H Argentina Alfaro MD        ondansetron (ZOFRAN ODT) ODT tab 4 mg  4 mg Oral Q6H PRN Argentina Alfaro MD        Or    ondansetron (ZOFRAN) injection 4 mg  4 mg Intravenous Q6H PRN Argentina Alfaro MD        senna-docusate (SENOKOT-S/PERICOLACE) 8.6-50 MG per tablet 1 tablet  1 tablet Oral BID PRN Argentina Alfaro MD        Or    senna-docusate (SENOKOT-S/PERICOLACE) 8.6-50 MG per tablet 2 tablet  2 tablet Oral BID PRN Argentina Alfaro MD        sodium chloride (PF) 0.9% PF flush 3 mL  3 mL Intracatheter Q8H Argentina Alfaro MD        sodium chloride (PF) 0.9% PF flush 3 mL  3 mL Intracatheter q1 min prn Argentina Alfaro MD         Current Outpatient Medications   Medication Sig Dispense Refill    acetaminophen (TYLENOL) 500 MG tablet Take 1-2 tablets (500-1,000 mg) by mouth every 6 hours as needed for mild pain (Patient not taking: Reported on 3/20/2024) 20 tablet 1    albuterol (PROAIR HFA/PROVENTIL HFA/VENTOLIN HFA) 108 (90 Base) MCG/ACT inhaler Inhale 2 puffs into the lungs every 4 hours as needed for shortness of breath,  wheezing or cough. 18 g 3    albuterol (PROVENTIL) (2.5 MG/3ML) 0.083% neb solution Take 1 vial (2.5 mg) by nebulization every 6 hours as needed for shortness of breath, wheezing or cough. 90 mL 3    AREXVY injection       aspirin 81 MG EC tablet Take 1 tablet (81 mg) by mouth daily      BD PEN NEEDLE CALLI 2ND GEN 32G X 4 MM miscellaneous USE AS DIRECTED ONCE PER DAY      blood glucose (NO BRAND SPECIFIED) test strip TEST THREE TIMES DAILY      blood glucose (NO BRAND SPECIFIED) test strip USE TO CHECK BLOOD SUGAR LEVELS THREE TIMES DAILY (Patient not taking: Reported on 10/9/2024)      blood glucose monitoring (SOFTCLIX) lancets TEST THREE TIMES DAILY AS DIRECTED (Patient not taking: Reported on 10/9/2024)      cetirizine (ZYRTEC) 10 MG tablet Take 1 tablet (10 mg) by mouth daily. 30 tablet 3    clotrimazole-betamethasone (LOTRISONE) 1-0.05 % external cream APPLY TOPICALLY TO THE AFFECTED AND SURROUNDING AREAS TWICE DAILY IN THE MORNING AND IN THE EVENING FOR 2 WEEKS      cyanocobalamin (VITAMIN B-12) 1000 MCG tablet Take 1,000 mcg by mouth daily.      fluticasone (FLONASE) 50 MCG/ACT nasal spray Spray 1 spray into both nostrils daily. 16 g 3    gemcitabine (GEMZAR) 2 G/52.6 ML injection inject 2 grams intravesically      guaiFENesin (MUCINEX) 600 MG 12 hr tablet Take 2 tablets (1,200 mg) by mouth 2 times daily. 60 tablet 3    insulin detemir (LEVEMIR PEN) 100 UNIT/ML pen Inject 55 Units Subcutaneous at bedtime      JARDIANCE 10 MG TABS tablet TAKE 1 TABLET BY MOUTH DAILY FOR DIABETES      JARDIANCE 10 MG TABS tablet TAKE 1 TABLET BY MOUTH DAILY FOR DIABETES (Patient not taking: Reported on 10/9/2024)      lisinopril (PRINIVIL,ZESTRIL) 10 MG tablet [LISINOPRIL (PRINIVIL,ZESTRIL) 10 MG TABLET] Take 10 mg by mouth daily.      magnesium 250 MG tablet 1 tablet with a meal Orally Once a day      metFORMIN (GLUCOPHAGE) 500 MG tablet [METFORMIN (GLUCOPHAGE) 500 MG TABLET] Take 1,000 mg by mouth 2 (two) times a day with  "meals.      pioglitazone (ACTOS) 15 MG tablet Take 15 mg by mouth daily      predniSONE (DELTASONE) 20 MG tablet Take 2 tabs daily x 5 days (Patient not taking: Reported on 10/9/2024) 10 tablet 0    primidone (MYSOLINE) 50 MG tablet [PRIMIDONE (MYSOLINE) 50 MG TABLET] Take 50 mg by mouth at bedtime.      simvastatin (ZOCOR) 40 MG tablet Take 40 mg by mouth every evening      sodium bicarbonate 650 MG tablet TAKE 2 TABLET BY MOUTH IN EVENING PRIOR TO AND MORNING OF TREATMENT FOR 3 TREATMENTS      sulfamethoxazole-trimethoprim (BACTRIM DS) 800-160 MG tablet Take 1 tablet by mouth 2 times daily.      tamsulosin (FLOMAX) 0.4 MG capsule Take 0.4 mg by mouth daily.      umeclidinium-vilanterol (ANORO ELLIPTA) 62.5-25 MCG/ACT oral inhaler Inhale 1 puff into the lungs daily 60 each 11       ALLERGIES:  Allergies   Allergen Reactions    Tdvax [Tetanus-Diphtheria Toxoids Td]      Other reaction(s): swelling    Tetanus Toxoid, Adsorbed [Tetanus Toxoid] Swelling    Naproxen Rash     Other reaction(s): rash, nausea       FAMILY HISTORY:  No family history on file.    SOCIAL HISTORY:   Social History     Socioeconomic History    Marital status:    Tobacco Use    Smoking status: Former     Current packs/day: 0.40     Average packs/day: 0.4 packs/day for 30.0 years (12.0 ttl pk-yrs)     Types: Cigarettes    Smokeless tobacco: Never   Substance and Sexual Activity    Alcohol use: No    Drug use: No     Social Drivers of Health      Received from [facility], [facility]    Social Connections       VITALS:  BP (!) 154/75   Pulse 119   Temp 98.4  F (36.9  C) (Oral)   Resp 17   Ht 1.651 m (5' 5\")   Wt 83.9 kg (185 lb)   SpO2 98%   BMI 30.79 kg/m      PHYSICAL EXAM:  Physical Exam  Vitals and nursing note reviewed.   Constitutional:       Appearance: Normal appearance.   HENT:      Head: Normocephalic and atraumatic.      Right Ear: External ear normal.      " Left Ear: External ear normal.      Nose: Nose normal.      Mouth/Throat:      Mouth: Mucous membranes are moist.   Eyes:      Extraocular Movements: Extraocular movements intact.      Conjunctiva/sclera: Conjunctivae normal.      Pupils: Pupils are equal, round, and reactive to light.   Cardiovascular:      Rate and Rhythm: Regular rhythm. Tachycardia present.   Pulmonary:      Effort: Pulmonary effort is normal.      Breath sounds: Wheezing present. No rales.      Comments: Marked inspiratory and expiratory wheezes  Abdominal:      General: Abdomen is flat. There is no distension.      Palpations: Abdomen is soft.      Tenderness: There is no abdominal tenderness. There is no guarding.   Musculoskeletal:         General: Normal range of motion.      Cervical back: Normal range of motion and neck supple.      Right lower leg: No edema.      Left lower leg: No edema.   Lymphadenopathy:      Cervical: No cervical adenopathy.   Skin:     General: Skin is warm and dry.   Neurological:      General: No focal deficit present.      Mental Status: He is alert and oriented to person, place, and time. Mental status is at baseline.      Comments: No gross focal neurologic deficits   Psychiatric:         Mood and Affect: Mood normal.         Behavior: Behavior normal.         Thought Content: Thought content normal.          LAB:  All pertinent labs reviewed and interpreted.  Results for orders placed or performed during the hospital encounter of 10/24/24   XR Chest Port 1 View    Impression    IMPRESSION: No acute airspace consolidation. Bibasilar interstitial prominence, not significantly changed from prior. No pleural effusion or pneumothorax.    Heart size is normal. Atherosclerotic calcifications of the thoracic aorta.   Symptomatic Influenza A/B, RSV, & SARS-CoV2 PCR (COVID-19) Nose    Specimen: Nose; Swab   Result Value Ref Range    Influenza A PCR Negative Negative    Influenza B PCR Negative Negative    RSV PCR  Negative Negative    SARS CoV2 PCR Negative Negative   Extra Blue Top Tube   Result Value Ref Range    Hold Specimen JIC    Extra Red Top Tube   Result Value Ref Range    Hold Specimen JIC    Extra Green Top (Lithium Heparin) Tube   Result Value Ref Range    Hold Specimen JIC    Extra Blood Bank Purple Top Tube   Result Value Ref Range    Hold Specimen JIC    Extra Heparinized Syringe   Result Value Ref Range    Hold Specimen JIC    D dimer quantitative   Result Value Ref Range    D-Dimer Quantitative <0.27 0.00 - 0.50 ug/mL FEU   Basic metabolic panel   Result Value Ref Range    Sodium 139 135 - 145 mmol/L    Potassium 5.1 3.4 - 5.3 mmol/L    Chloride 98 98 - 107 mmol/L    Carbon Dioxide (CO2) 29 22 - 29 mmol/L    Anion Gap 12 7 - 15 mmol/L    Urea Nitrogen 14.5 8.0 - 23.0 mg/dL    Creatinine 0.88 0.67 - 1.17 mg/dL    GFR Estimate 87 >60 mL/min/1.73m2    Calcium 9.2 8.8 - 10.4 mg/dL    Glucose 516 (HH) 70 - 99 mg/dL   Lactic acid whole blood with 1x repeat in 2 hr when >2   Result Value Ref Range    Lactic Acid, Initial 2.0 0.7 - 2.0 mmol/L   Result Value Ref Range    Troponin T, High Sensitivity 14 <=22 ng/L   Result Value Ref Range    Magnesium 2.4 (H) 1.7 - 2.3 mg/dL   Blood gas venous   Result Value Ref Range    pH Venous 7.23 (L) 7.32 - 7.43    pCO2 Venous 75 (H) 40 - 50 mm Hg    pO2 Venous 27 25 - 47 mm Hg    Bicarbonate Venous 31 (H) 21 - 28 mmol/L    Base Excess/Deficit Venous 1.0 -3.0 - 3.0 mmol/L    FIO2 21     Oxyhemoglobin Venous 39 (L) 70 - 75 %    O2 Sat, Venous 39.5 (L) 70.0 - 75.0 %   Nt probnp inpatient (BNP)   Result Value Ref Range    N terminal Pro BNP Inpatient <36 0 - 1,800 pg/mL   CBC with platelets and differential   Result Value Ref Range    WBC Count 12.2 (H) 4.0 - 11.0 10e3/uL    RBC Count 5.39 4.40 - 5.90 10e6/uL    Hemoglobin 15.7 13.3 - 17.7 g/dL    Hematocrit 50.7 40.0 - 53.0 %    MCV 94 78 - 100 fL    MCH 29.1 26.5 - 33.0 pg    MCHC 31.0 (L) 31.5 - 36.5 g/dL    RDW 13.3 10.0 - 15.0 %     Platelet Count 210 150 - 450 10e3/uL    % Neutrophils 50 %    % Lymphocytes 29 %    % Monocytes 7 %    % Eosinophils 13 %    % Basophils 1 %    % Immature Granulocytes 0 %    NRBCs per 100 WBC 0 <1 /100    Absolute Neutrophils 6.1 1.6 - 8.3 10e3/uL    Absolute Lymphocytes 3.5 0.8 - 5.3 10e3/uL    Absolute Monocytes 0.8 0.0 - 1.3 10e3/uL    Absolute Eosinophils 1.6 (H) 0.0 - 0.7 10e3/uL    Absolute Basophils 0.1 0.0 - 0.2 10e3/uL    Absolute Immature Granulocytes 0.1 <=0.4 10e3/uL    Absolute NRBCs 0.0 10e3/uL   Blood gas arterial   Result Value Ref Range    pH Arterial 7.33 (L) 7.35 - 7.45    pCO2 Arterial 52 (H) 35 - 45 mm Hg    pO2 Arterial 145 (H) 80 - 105 mm Hg    FIO2 35     Bicarbonate Arterial 27 21 - 28 mmol/L    Base Excess/Deficit Arterial 0.0 -3.0 - 3.0 mmol/L    Oxyhemoglobin Arterial 98 92 - 100 %    O2 Sat, Arterial 99.4 (H) 95.0 - 96.0 %   Hemoglobin A1c   Result Value Ref Range    Estimated Average Glucose 255 (H) <117 mg/dL    Hemoglobin A1C 10.5 (H) <5.7 %   ECG 12-LEAD WITH MUSE (LHE)   Result Value Ref Range    Systolic Blood Pressure  mmHg    Diastolic Blood Pressure  mmHg    Ventricular Rate 136 BPM    Atrial Rate 136 BPM    TX Interval 158 ms    QRS Duration 90 ms     ms    QTc 433 ms    P Axis 83 degrees    R AXIS 169 degrees    T Axis 77 degrees    Interpretation ECG       Sinus tachycardia  Right axis deviation  Pulmonary disease pattern  Abnormal ECG  When compared with ECG of 14-Aug-2024 09:41,  Vent. rate has increased by  47 bpm  Confirmed by SEE ED PROVIDER NOTE FOR, ECG INTERPRETATION ([id number]),  DIANE AMEZCUA ([id number]) on 10/25/2024 1:00:57 AM         RADIOLOGY:  Reviewed all pertinent imaging. Please see official radiology report.  XR Chest Port 1 View   Final Result   IMPRESSION: No acute airspace consolidation. Bibasilar interstitial prominence, not significantly changed from prior. No pleural effusion or pneumothorax.      Heart size is normal. Atherosclerotic  calcifications of the thoracic aorta.          I, Luisana Oropeza, am serving as a scribe to document services personally performed by Dr. Hassan based on my observation and the provider's statements to me. I, Juanjo Hassan MD attest that Luisana Oropeza is acting in a scribe capacity, has observed my performance of the services and has documented them in accordance with my direction.    Juanjo Hassan M.D.  Emergency Medicine  [facility] EMERGENCY DEPARTMENT  [address] [place] [id number]  [phone]  Dept: [phone]       Juanjo Hassan MD  10/25/24 0217

## 2024-10-26 LAB
ANION GAP SERPL CALCULATED.3IONS-SCNC: 16 MMOL/L (ref 7–15)
BUN SERPL-MCNC: 29.6 MG/DL (ref 8–23)
CALCIUM SERPL-MCNC: 8.5 MG/DL (ref 8.8–10.4)
CHLORIDE SERPL-SCNC: 103 MMOL/L (ref 98–107)
CREAT SERPL-MCNC: 0.8 MG/DL (ref 0.67–1.17)
EGFRCR SERPLBLD CKD-EPI 2021: 89 ML/MIN/1.73M2
GLUCOSE BLDC GLUCOMTR-MCNC: 200 MG/DL (ref 70–99)
GLUCOSE BLDC GLUCOMTR-MCNC: 209 MG/DL (ref 70–99)
GLUCOSE BLDC GLUCOMTR-MCNC: 232 MG/DL (ref 70–99)
GLUCOSE BLDC GLUCOMTR-MCNC: 269 MG/DL (ref 70–99)
GLUCOSE BLDC GLUCOMTR-MCNC: 310 MG/DL (ref 70–99)
GLUCOSE SERPL-MCNC: 226 MG/DL (ref 70–99)
HCO3 SERPL-SCNC: 22 MMOL/L (ref 22–29)
HOLD SPECIMEN: NORMAL
MAGNESIUM SERPL-MCNC: 2.5 MG/DL (ref 1.7–2.3)
POTASSIUM SERPL-SCNC: 4.9 MMOL/L (ref 3.4–5.3)
SODIUM SERPL-SCNC: 141 MMOL/L (ref 135–145)

## 2024-10-26 PROCEDURE — 250N000011 HC RX IP 250 OP 636: Performed by: INTERNAL MEDICINE

## 2024-10-26 PROCEDURE — 36415 COLL VENOUS BLD VENIPUNCTURE: CPT | Performed by: STUDENT IN AN ORGANIZED HEALTH CARE EDUCATION/TRAINING PROGRAM

## 2024-10-26 PROCEDURE — 250N000013 HC RX MED GY IP 250 OP 250 PS 637: Performed by: STUDENT IN AN ORGANIZED HEALTH CARE EDUCATION/TRAINING PROGRAM

## 2024-10-26 PROCEDURE — 258N000003 HC RX IP 258 OP 636: Performed by: INTERNAL MEDICINE

## 2024-10-26 PROCEDURE — 83735 ASSAY OF MAGNESIUM: CPT | Performed by: INTERNAL MEDICINE

## 2024-10-26 PROCEDURE — 250N000013 HC RX MED GY IP 250 OP 250 PS 637: Performed by: INTERNAL MEDICINE

## 2024-10-26 PROCEDURE — 250N000012 HC RX MED GY IP 250 OP 636 PS 637: Performed by: INTERNAL MEDICINE

## 2024-10-26 PROCEDURE — 120N000013 HC R&B IMCU

## 2024-10-26 PROCEDURE — 80048 BASIC METABOLIC PNL TOTAL CA: CPT | Performed by: STUDENT IN AN ORGANIZED HEALTH CARE EDUCATION/TRAINING PROGRAM

## 2024-10-26 PROCEDURE — 99233 SBSQ HOSP IP/OBS HIGH 50: CPT | Performed by: STUDENT IN AN ORGANIZED HEALTH CARE EDUCATION/TRAINING PROGRAM

## 2024-10-26 PROCEDURE — 99232 SBSQ HOSP IP/OBS MODERATE 35: CPT | Performed by: INTERNAL MEDICINE

## 2024-10-26 RX ORDER — PREDNISONE 20 MG/1
40 TABLET ORAL DAILY
Status: DISCONTINUED | OUTPATIENT
Start: 2024-10-26 | End: 2024-10-27 | Stop reason: HOSPADM

## 2024-10-26 RX ORDER — AZITHROMYCIN 250 MG/1
250 TABLET, FILM COATED ORAL DAILY
Status: DISCONTINUED | OUTPATIENT
Start: 2024-10-27 | End: 2024-10-27 | Stop reason: HOSPADM

## 2024-10-26 RX ORDER — PREDNISONE 20 MG/1
20 TABLET ORAL DAILY
Status: DISCONTINUED | OUTPATIENT
Start: 2024-11-01 | End: 2024-10-27 | Stop reason: HOSPADM

## 2024-10-26 RX ORDER — PREDNISONE 5 MG/1
10 TABLET ORAL DAILY
Status: DISCONTINUED | OUTPATIENT
Start: 2024-11-04 | End: 2024-10-27 | Stop reason: HOSPADM

## 2024-10-26 RX ADMIN — AZITHROMYCIN MONOHYDRATE 500 MG: 500 INJECTION, POWDER, LYOPHILIZED, FOR SOLUTION INTRAVENOUS at 02:16

## 2024-10-26 RX ADMIN — INSULIN ASPART 3 UNITS: 100 INJECTION, SOLUTION INTRAVENOUS; SUBCUTANEOUS at 21:13

## 2024-10-26 RX ADMIN — EMPAGLIFLOZIN 10 MG: 10 TABLET, FILM COATED ORAL at 21:13

## 2024-10-26 RX ADMIN — ASPIRIN 81 MG: 81 TABLET, COATED ORAL at 08:01

## 2024-10-26 RX ADMIN — SIMVASTATIN 40 MG: 40 TABLET, FILM COATED ORAL at 21:13

## 2024-10-26 RX ADMIN — DOCUSATE SODIUM 100 MG: 100 CAPSULE, LIQUID FILLED ORAL at 21:13

## 2024-10-26 RX ADMIN — FAMOTIDINE 20 MG: 20 TABLET ORAL at 21:13

## 2024-10-26 RX ADMIN — FAMOTIDINE 20 MG: 20 TABLET ORAL at 08:01

## 2024-10-26 RX ADMIN — UMECLIDINIUM BROMIDE AND VILANTEROL TRIFENATATE 1 PUFF: 62.5; 25 POWDER RESPIRATORY (INHALATION) at 08:01

## 2024-10-26 RX ADMIN — INSULIN DETEMIR 55 UNITS: 100 INJECTION, SOLUTION SUBCUTANEOUS at 21:14

## 2024-10-26 RX ADMIN — CETIRIZINE HYDROCHLORIDE 10 MG: 10 TABLET, FILM COATED ORAL at 08:01

## 2024-10-26 RX ADMIN — PREDNISONE 40 MG: 20 TABLET ORAL at 16:20

## 2024-10-26 RX ADMIN — LISINOPRIL 10 MG: 5 TABLET ORAL at 08:01

## 2024-10-26 RX ADMIN — METHYLPREDNISOLONE SODIUM SUCCINATE 62.5 MG: 125 INJECTION, POWDER, FOR SOLUTION INTRAMUSCULAR; INTRAVENOUS at 08:01

## 2024-10-26 RX ADMIN — CEFTRIAXONE SODIUM 1 G: 1 INJECTION, POWDER, FOR SOLUTION INTRAMUSCULAR; INTRAVENOUS at 01:42

## 2024-10-26 RX ADMIN — METHYLPREDNISOLONE SODIUM SUCCINATE 62.5 MG: 125 INJECTION, POWDER, FOR SOLUTION INTRAMUSCULAR; INTRAVENOUS at 00:37

## 2024-10-26 RX ADMIN — EMPAGLIFLOZIN 10 MG: 10 TABLET, FILM COATED ORAL at 08:01

## 2024-10-26 NOTE — PLAN OF CARE
Problem: Gas Exchange Impaired  Goal: Optimal Gas Exchange  Outcome: Progressing  Intervention: Optimize Oxygenation and Ventilation  Recent Flowsheet Documentation  Taken 10/26/2024 0217 by Marleny Santana RN  Head of Bed ([facility]) Positioning: HOB at 20-30 degrees  Taken 10/25/2024 2118 by Marleny Santana RN  Head of Bed ([facility]) Positioning: HOB at 20-30 degrees     Problem: Comorbidity Management  Goal: Blood Glucose Levels Within Targeted Range  Outcome: Not Progressing  Intervention: Monitor and Manage Glycemia  Recent Flowsheet Documentation  Taken 10/26/2024 0217 by Marleny Santana, RN  Medication Review/Management: medications reviewed  Taken 10/25/2024 2119 by Marleny Santana RN  Medication Review/Management: medications reviewed   Goal Outcome Evaluation:       Patient A/O x4. Denied pain. VSS on 2L NC. Lungs sounds coarse. Blood sugars 310 and 232 coverage given as ordered. Tele Sinus tachy with an occasional PAC and PVC. Bed alarm activated and call light within reach.

## 2024-10-26 NOTE — PROGRESS NOTES
Pulmonary Progress Note  10/26/2024      Admit Date: 10/24/2024  CODE: Full Code    Reason for Consult: acute respiratory failure with hypoxemia; COPD exacerbation    Assessment/Plan:   79M with hx of COPD, DM, HTN, other issues, presented with COPD exacerbation and acute resp failure with hypoxemia. Significant improvement with nebs, steroids, abx. Eos 1700 on admission; resolved with steroids. IgE pending.     Recommendations:  - titrate FiO2 for goal SpO2 88-92%, avoid hyperoxia. Home O2 prior to discharge  - continue scheduled duonebs, albuterol nebs prn, flutter valve  - prednisone taper ordered  - stop IV ceftriaxone. PO azithromycin for 4 more days  - resume PTA inhalers upon discharge  - has follow up scheduled in clinic with Ericka Lancaster CNP on 12/6    No further recommendations; we'll sign off. Please call with additional questions.    Mehrdad (Neo) MD Gurvinder  [facility] ([facility])  Send me a secure message using [employer]  Clinic [phone]  Fax [phone]    Subjective/Interim Events:   Feels much better. On 2lpm O2, with SpO2 low 90s.  Minimal cough. No hemoptysis.       Medications:     Current Facility-Administered Medications   Medication Dose Route Frequency Provider Last Rate Last Admin     Current Facility-Administered Medications   Medication Dose Route Frequency Provider Last Rate Last Admin    aspirin EC tablet 81 mg  81 mg Oral Daily Kenneth Layton MD   81 mg at 10/26/24 0801    azithromycin (ZITHROMAX) tablet 250 mg  250 mg Oral Daily Mehrdad Hollins MD        cetirizine (zyrTEC) tablet 10 mg  10 mg Oral Daily Kenneth Layton MD   10 mg at 10/26/24 0801    docusate sodium (COLACE) capsule 100 mg  100 mg Oral BID Higinio Rock MD   100 mg at 10/25/24 2109    empagliflozin (JARDIANCE) tablet 10 mg  10 mg Oral BID Kenneth Layton MD   10 mg at 10/26/24 0801    famotidine (PEPCID) tablet 20 mg  20 mg Oral BID Kenneth Layton MD   20  "mg at 10/26/24 0801    insulin aspart (NovoLOG) injection (RAPID ACTING)  1-7 Units Subcutaneous TID AC Higinio Rock MD   2 Units at 10/26/24 0756    insulin aspart (NovoLOG) injection (RAPID ACTING)  1-5 Units Subcutaneous At Bedtime Higinio Rock MD   3 Units at 10/25/24 2111    insulin detemir (LEVEMIR PEN) injection 55 Units  55 Units Subcutaneous At Bedtime Kenneth Layton MD   55 Units at 10/25/24 2112    lisinopril (ZESTRIL) tablet 10 mg  10 mg Oral Daily Kenneth Layton MD   10 mg at 10/26/24 0801    predniSONE (DELTASONE) tablet 40 mg  40 mg Oral Daily Mehrdad Hollins MD        Followed by    [START ON 10/29/2024] predniSONE (DELTASONE) tablet 30 mg  30 mg Oral Daily Mehrdad Hollins MD        Followed by    [START ON 11/1/2024] predniSONE (DELTASONE) tablet 20 mg  20 mg Oral Daily Mehrdad Hollins MD        Followed by    [START ON 11/4/2024] predniSONE (DELTASONE) tablet 10 mg  10 mg Oral Daily Mehrdad Hollins MD        simvastatin (ZOCOR) tablet 40 mg  40 mg Oral QPM Kenneth Layton MD   40 mg at 10/25/24 2109    sodium chloride (PF) 0.9% PF flush 3 mL  3 mL Intracatheter Q8H Higinio Rock MD   3 mL at 10/26/24 0142    umeclidinium-vilanterol (ANORO ELLIPTA) 62.5-25 MCG/ACT oral inhaler 1 puff  1 puff Inhalation Daily Kenneth Layton MD   1 puff at 10/26/24 0801         Exam/Data:   Vitals  /61 (BP Location: Left arm)   Pulse 101   Temp 97.2  F (36.2  C) (Oral)   Resp 16   Ht 1.651 m (5' 5\")   Wt 81.4 kg (179 lb 8 oz)   SpO2 90%   BMI 29.87 kg/m    BP - Mean:  [100] 100  I/O last 3 completed shifts:  In: 720 [P.O.:720]  Out: 2975 [Urine:2975]  Weight change:   [unfilled]  Resp: 16    EXAM:  Physical Exam  Gen: awake, alert, oriented, no distress  HEENT: NT, no JEOVANNY  CV: RRR, no m/g/r  Resp: diminished throughout. No wheezing.   Abd: soft, nontender, BS+  Skin: no rashes or lesions  Ext: no edema  Neuro: PERRL, nonfocal exam    ROS:  A 10-system review was obtained " and is negative with the exception of the symptoms noted above.    DATA:    PFT DATA   Previously reviewed    Micro  No positive cultures  RVP neg      IMAGING:   XR Chest Port 1 View    Result Date: 10/25/2024  EXAM: XR CHEST PORT 1 VIEW LOCATION: [facility] DATE: 10/25/2024 INDICATION: Dyspnea. COMPARISON: 8/14/2024.     IMPRESSION: No acute airspace consolidation. Bibasilar interstitial prominence, not significantly changed from prior. No pleural effusion or pneumothorax. Heart size is normal. Atherosclerotic calcifications of the thoracic aorta.

## 2024-10-26 NOTE — PROGRESS NOTES
[facility]    Medicine Progress Note - Hospitalist Service    Date of Admission:  10/24/2024    Assessment & Plan   Kelechi Kendrick is an 79-year-old with a medical history significant for COPD, type II DM, hypertension, bladder cancer, former nicotine use and other medical comorbidities who is admitted with acute COPD exacerbation with hypoxemic and hypercapnic respiratory failure.      #Acute hypoxemic and hypercapnic respiratory failure  #COPD with acute exacerbation  Presenting with worsening SOB and cough over about 3 days, found to have diffuse wheezing consistent with COPD exacerbation. VBG showing elevated pCO2. Improved with BiPAP in ED then weaned to NC.  - Pulmonology consulted; follows in clinic, now signed off  - S/p Solumedrol IV; now on prednisone taper  - On 2L O2, try to wean, goal 88-92%, likely discharge 10/27 hopefully without O2  - Duonebs q4h while awake  - Continue home inhalers  - Continue azithromycin  - Has follow up 12/6 in pulm clinic    #Type II Diabetes Mellitus with Hyperglycemia  Quite insulin resistant. Exacerbated by steroids  - Continue home Lantus 55u at bedtime  - sliding scale insulin  - Continue home statin  - Continue home Jardiance    #Hypertension  - Continue home lisinopril    #History of Bladder Cancer (status post-TURBT)  - OP Urology follow up          Diet: Low Consistent Carb (45 g CHO per Meal) Diet    DVT Prophylaxis: Pneumatic Compression Devices  Buchanan Catheter: Not present  Lines: None     Cardiac Monitoring: ACTIVE order. Indication: Tachyarrhythmias, acute (48 hours)  Code Status: Full Code      Clinically Significant Risk Factors           # Hypocalcemia: Lowest Ca = 8.5 mg/dL in last 2 days, will monitor and replace as appropriate         # Hypertension: Noted on problem list    # Acute Hypercapnic Respiratory Failure: based on venous blood gas results.  Continue supplemental oxygen and ventilatory support as indicated.        #  "DMII: A1C = 10.5 % (Ref range: <5.7 %) within past 6 months, PRESENT ON ADMISSION  # Overweight: Estimated body mass index is 29.79 kg/m  as calculated from the following:    Height as of this encounter: 1.651 m (5' 5\").    Weight as of this encounter: 81.2 kg (179 lb)., PRESENT ON ADMISSION     # COPD: noted on problem list        Disposition Plan     Medically Ready for Discharge: Anticipated Tomorrow             HEMA TRACY MD  Hospitalist Service  [facility]  Securely message with [employer] (more info)  Text page via [facility] Paging/Directory   ______________________________________________________________________    Interval History   Feeling subjectively better, but still needing  2L O2. Wheezing has resolved.    Physical Exam   Vital Signs: Temp: 97.2  F (36.2  C) Temp src: Oral BP: 106/61 Pulse: 101   Resp: 16 SpO2: 90 % O2 Device: Nasal cannula Oxygen Delivery: 2 LPM  Weight: 179 lbs 0 oz    General: No overt distress, conversational, non-toxic appearing  HEENT: MMM  Pulmonary: Wheezing has resolved, air movement in bilateral fields has improved, no crackles or rhonchi  Cardiac: RRR. No m/r/g  Abdomen: Soft. NT, ND.  Extremities: No bilateral LE edema  Neuro: alert and awake, Ox4      Medical Decision Making       50 MINUTES SPENT BY ME on the date of service doing chart review, history, exam, documentation & further activities per the note.      Data     I have personally reviewed the following data over the past 24 hrs:    N/A  \   N/A   / N/A     141 103 29.6 (H) /  200 (H)   4.9 22 0.80 \       Imaging results reviewed over the past 24 hrs:   No results found for this or any previous visit (from the past 24 hours).    "

## 2024-10-26 NOTE — PLAN OF CARE
Problem: Adult Inpatient Plan of Care  Goal: Absence of Hospital-Acquired Illness or Injury  Intervention: Identify and Manage Fall Risk  Recent Flowsheet Documentation  Taken 10/25/2024 1714 by Emma Lopez, RN  Safety Promotion/Fall Prevention:   assistive device/personal items within reach   clutter free environment maintained   nonskid shoes/slippers when out of bed   room near nurse's station   safety round/check completed     Problem: Adult Inpatient Plan of Care  Goal: Optimal Comfort and Wellbeing  Outcome: Progressing  Intervention: Provide Person-Centered Care  Recent Flowsheet Documentation  Taken 10/25/2024 1714 by Emma Lopez, RN  Trust Relationship/Rapport:   care explained   choices provided   emotional support provided   questions answered   questions encouraged   reassurance provided   Goal Outcome Evaluation:      Plan of Care Reviewed With: patient    Overall Patient Progress: improvingOverall Patient Progress: improving         Patient on 1L via nasal cannula.  Urinal at bedside.  Call light within reach, able to make needs known.

## 2024-10-26 NOTE — PLAN OF CARE
Problem: Adult Inpatient Plan of Care  Goal: Optimal Comfort and Wellbeing  Outcome: Progressing  Intervention: Provide Person-Centered Care  Recent Flowsheet Documentation  Taken 10/26/2024 0808 by Emma Lopez, RN  Trust Relationship/Rapport:   care explained   choices provided   emotional support provided   questions answered   questions encouraged   reassurance provided     Problem: Comorbidity Management  Goal: Maintenance of COPD Symptom Control  Outcome: Progressing  Intervention: Maintain COPD Symptom Control  Recent Flowsheet Documentation  Taken 10/26/2024 0808 by Emma Lopez, RN  Medication Review/Management: medications reviewed     Problem: Comorbidity Management  Goal: Blood Glucose Levels Within Targeted Range  Outcome: Progressing  Intervention: Monitor and Manage Glycemia  Recent Flowsheet Documentation  Taken 10/26/2024 0808 by Emma Lopez, RN  Medication Review/Management: medications reviewed   Goal Outcome Evaluation:      Plan of Care Reviewed With: patient    Overall Patient Progress: improvingOverall Patient Progress: improving     Alert and orientated x4.  Denies pain.  Up to chair much of day.  Denies any nausea and tolerating diet.  Eating 100% of meals.  Up ambulating with staff in hallways.  Stand by assist with gait belt.  Patient desaturated with activity. Required 2L nasal cannula with activity to maintain oxygen level greater than 88%.

## 2024-10-27 VITALS
SYSTOLIC BLOOD PRESSURE: 135 MMHG | HEIGHT: 65 IN | RESPIRATION RATE: 18 BRPM | OXYGEN SATURATION: 90 % | HEART RATE: 85 BPM | TEMPERATURE: 97.9 F | BODY MASS INDEX: 29.82 KG/M2 | DIASTOLIC BLOOD PRESSURE: 67 MMHG | WEIGHT: 179 LBS

## 2024-10-27 LAB
GLUCOSE BLDC GLUCOMTR-MCNC: 149 MG/DL (ref 70–99)
GLUCOSE BLDC GLUCOMTR-MCNC: 187 MG/DL (ref 70–99)
H CAPSUL AG UR QL IA: NOT DETECTED
H CAPSUL AG UR-MCNC: NOT DETECTED NG/ML
IGE SERPL-ACNC: 58 KU/L (ref 0–114)

## 2024-10-27 PROCEDURE — 250N000012 HC RX MED GY IP 250 OP 636 PS 637: Performed by: INTERNAL MEDICINE

## 2024-10-27 PROCEDURE — 250N000013 HC RX MED GY IP 250 OP 250 PS 637: Performed by: INTERNAL MEDICINE

## 2024-10-27 PROCEDURE — 99239 HOSP IP/OBS DSCHRG MGMT >30: CPT | Performed by: STUDENT IN AN ORGANIZED HEALTH CARE EDUCATION/TRAINING PROGRAM

## 2024-10-27 PROCEDURE — 250N000013 HC RX MED GY IP 250 OP 250 PS 637: Performed by: STUDENT IN AN ORGANIZED HEALTH CARE EDUCATION/TRAINING PROGRAM

## 2024-10-27 RX ORDER — PREDNISONE 10 MG/1
TABLET ORAL
Qty: 22 TABLET | Refills: 0 | Status: SHIPPED | OUTPATIENT
Start: 2024-10-28 | End: 2024-11-07

## 2024-10-27 RX ORDER — AZITHROMYCIN 250 MG/1
250 TABLET, FILM COATED ORAL DAILY
Qty: 2 TABLET | Refills: 0 | Status: SHIPPED | OUTPATIENT
Start: 2024-10-28 | End: 2024-10-30

## 2024-10-27 RX ADMIN — UMECLIDINIUM BROMIDE AND VILANTEROL TRIFENATATE 1 PUFF: 62.5; 25 POWDER RESPIRATORY (INHALATION) at 08:04

## 2024-10-27 RX ADMIN — ASPIRIN 81 MG: 81 TABLET, COATED ORAL at 08:05

## 2024-10-27 RX ADMIN — AZITHROMYCIN DIHYDRATE 250 MG: 250 TABLET ORAL at 08:05

## 2024-10-27 RX ADMIN — CETIRIZINE HYDROCHLORIDE 10 MG: 10 TABLET, FILM COATED ORAL at 08:05

## 2024-10-27 RX ADMIN — FAMOTIDINE 20 MG: 20 TABLET ORAL at 08:05

## 2024-10-27 RX ADMIN — PREDNISONE 40 MG: 20 TABLET ORAL at 08:05

## 2024-10-27 RX ADMIN — EMPAGLIFLOZIN 10 MG: 10 TABLET, FILM COATED ORAL at 08:05

## 2024-10-27 RX ADMIN — LISINOPRIL 10 MG: 5 TABLET ORAL at 08:05

## 2024-10-27 ASSESSMENT — ACTIVITIES OF DAILY LIVING (ADL)
ADLS_ACUITY_SCORE: 0

## 2024-10-27 NOTE — DISCHARGE SUMMARY
"[facility]  Hospitalist Discharge Summary      Date of Admission:  10/24/2024  Date of Discharge:  10/27/2024 10:38 AM  Discharging Provider: HEMA TRACY MD  Discharge Service: Hospitalist Service    Discharge Diagnoses     #Acute hypoxemic and hypercapnic respiratory failure  #COPD with acute exacerbation   #Type II Diabetes Mellitus with Hyperglycemia   #Hypertension   #History of Bladder Cancer (status post-TURBT)     Clinically Significant Risk Factors     # DMII: A1C = 10.5 % (Ref range: <5.7 %) within past 6 months  # Overweight: Estimated body mass index is 29.79 kg/m  as calculated from the following:    Height as of this encounter: 1.651 m (5' 5\").    Weight as of this encounter: 81.2 kg (179 lb).       Follow-ups Needed After Discharge       Unresulted Labs Ordered in the Past 30 Days of this Admission       Date and Time Order Name Status Description    10/25/2024 11:13 AM IgE In process     10/25/2024 11:07 AM Histoplasma Galactomannan Antigen Quant by EIA, Urine In process         These results will be followed up by pulmonology in clinic    Discharge Disposition   Discharged to home  Condition at discharge: Stable    Hospital Course   Kelechi Kendrick is an 79-year-old with a medical history significant for COPD, type II DM, hypertension, bladder cancer, former nicotine use and other medical comorbidities who is admitted with acute COPD exacerbation with hypoxemic and hypercapnic respiratory failure.      #Acute hypoxemic and hypercapnic respiratory failure, resolved  #COPD with acute exacerbation  Presenting with worsening SOB and cough over about 3 days, found to have diffuse wheezing consistent with COPD exacerbation. VBG showing elevated pCO2. Improved with BiPAP in ED then weaned to NC and eventually room air.  - Pulmonology consulted; follows in clinic, now signed off  - S/p Solumedrol IV; now on prednisone taper, discharged with taper  - O2 home eval with " SpO2 >88% at rest and with walking  - Continue home inhalers  - Continue azithromycin to complete 5 day course  - Has follow up 12/6 in pulm clinic    #Type II Diabetes Mellitus with Hyperglycemia  Quite insulin resistant. Exacerbated by steroids  - Continue home Lantus 55u at bedtime  - Continue home statin  - Continue home Jardiance    #Hypertension  - Continue home lisinopril    #History of Bladder Cancer (status post-TURBT)  - OP Urology follow up      Consultations This Hospital Stay   PULMONARY IP CONSULT    Code Status   Full Code    Time Spent on this Encounter   I, HEMA TRACY MD, personally saw the patient today and spent greater than 30 minutes discharging this patient.       HEMA TRACY MD  [address] [address]  Phone: [phone]  Fax: [phone]  ______________________________________________________________________    Physical Exam   Vital Signs: Temp: 97.9  F (36.6  C) Temp src: Oral BP: 135/67 Pulse: 85   Resp: 18 SpO2: 90 % O2 Device: None (Room air) Oxygen Delivery: 1/2 LPM  Weight: 179 lbs 0 oz    General: No overt distress, conversational, non-toxic appearing  HEENT: MMM  Pulmonary: Wheezing has resolved, air movement in bilateral fields has improved, no crackles or rhonchi  Cardiac: RRR. No m/r/g  Abdomen: Soft. NT, ND.  Extremities: No bilateral LE edema  Neuro: alert and awake, Ox4          Primary Care Physician   Ramiro Person    Discharge Orders      Reason for your hospital stay    You were admitted for a COPD exacerbation and improved with steroids and nebulizer treatments. Please complete you steroid taper at home and follow up in your pulmonology clinic as scheduled.     Activity    Your activity upon discharge: activity as tolerated     Diet    Follow this diet upon discharge: Regular     Hospital Follow-up with Existing Primary Care Provider (PCP)            Discharge Follow-up Details     Hospital Follow-up with Existing Primary Care Provider (PCP)   Expected   date: Oct 27, 2024      Follow Up Appointment Details:     Schedule Primary Care visit within: 30 Days                   Significant Results and Procedures   Most Recent 3 CBC's:  Recent Labs   Lab Test 10/25/24  0427 10/24/24  2350 08/14/24  1206   WBC 10.8 12.2* 8.9   HGB 14.7 15.7 14.9   MCV 94 94 93    210 167     Most Recent 3 BMP's:  Recent Labs   Lab Test 10/27/24  0346 10/26/24  2111 10/26/24  1616 10/26/24  0726 10/26/24  0650 10/25/24  0552 10/25/24  0427 10/25/24  0226 10/24/24  2350   NA  --   --   --   --  141  --  141  --  139   POTASSIUM  --   --   --   --  4.9  --  5.3  --  5.1   CHLORIDE  --   --   --   --  103  --  103  --  98   CO2  --   --   --   --  22  --  26  --  29   BUN  --   --   --   --  29.6*  --  15.3  --  14.5   CR  --   --   --   --  0.80  --  0.75  --  0.88   ANIONGAP  --   --   --   --  16*  --  12  --  12   RODRIGUEZ  --   --   --   --  8.5*  --  8.7*  --  9.2   * 310* 269*   < > 226*   < > 361*   < > 516*    < > = values in this interval not displayed.   ,   Results for orders placed or performed during the hospital encounter of 10/24/24   XR Chest Port 1 View    Narrative    EXAM: XR CHEST PORT 1 VIEW  LOCATION: [facility]  DATE: 10/25/2024    INDICATION: Dyspnea.  COMPARISON: 8/14/2024.      Impression    IMPRESSION: No acute airspace consolidation. Bibasilar interstitial prominence, not significantly changed from prior. No pleural effusion or pneumothorax.    Heart size is normal. Atherosclerotic calcifications of the thoracic aorta.       Discharge Medications   Discharge Medication List as of 10/27/2024 10:06 AM        START taking these medications    Details   azithromycin (ZITHROMAX) 250 MG tablet Take 1 tablet (250 mg) by mouth daily for 2 days., Disp-2 tablet, R-0, E-Prescribe      predniSONE (DELTASONE) 10 MG tablet Take 4 tablets (40 mg) by mouth daily for 1 day,  THEN 3 tablets (30 mg) daily for 3 days, THEN 2 tablets (20 mg) daily for 3 days, THEN 1 tablet (10 mg) daily for 3 days., Disp-22 tablet, R-0, E-Prescribe           CONTINUE these medications which have NOT CHANGED    Details   aspirin 81 MG EC tablet Take 1 tablet (81 mg) by mouth daily, OTC      cetirizine (ZYRTEC) 10 MG tablet Take 1 tablet (10 mg) by mouth daily., Disp-30 tablet, R-3, E-Prescribe      fluticasone (FLONASE) 50 MCG/ACT nasal spray Spray 1 spray into both nostrils daily as needed for rhinitis or allergies., Historical      insulin detemir (LEVEMIR PEN) 100 UNIT/ML pen Inject 55 Units Subcutaneous at bedtime, Historical      JARDIANCE 10 MG TABS tablet Take 10 mg by mouth 2 times daily., ZOILA, Historical      lisinopril (PRINIVIL,ZESTRIL) 10 MG tablet [LISINOPRIL (PRINIVIL,ZESTRIL) 10 MG TABLET] Take 10 mg by mouth daily., Historical      magnesium 250 MG tablet Take 250 mg by mouth at bedtime., Historical      metFORMIN (GLUCOPHAGE) 500 MG tablet [METFORMIN (GLUCOPHAGE) 500 MG TABLET] Take 1,000 mg by mouth 2 (two) times a day with meals., Historical      primidone (MYSOLINE) 50 MG tablet [PRIMIDONE (MYSOLINE) 50 MG TABLET] Take 50 mg by mouth at bedtime., Historical      simvastatin (ZOCOR) 40 MG tablet Take 40 mg by mouth every evening, Historical      umeclidinium-vilanterol (ANORO ELLIPTA) 62.5-25 MCG/ACT oral inhaler Inhale 1 puff into the lungs daily, Disp-60 each, R-11, E-Prescribe      acetaminophen (TYLENOL) 500 MG tablet Take 1-2 tablets (500-1,000 mg) by mouth every 6 hours as needed for mild pain, Disp-20 tablet, R-1, E-Prescribe      albuterol (PROAIR HFA/PROVENTIL HFA/VENTOLIN HFA) 108 (90 Base) MCG/ACT inhaler Inhale 2 puffs into the lungs every 4 hours as needed for shortness of breath, wheezing or cough., Disp-18 g, R-3, E-PrescribePharmacy may dispense brand covered by insurance (Proair, or proventil or ventolin or generic albuterol inhaler)      albuterol (PROVENTIL) (2.5 MG/3ML)  0.083% neb solution Take 1 vial (2.5 mg) by nebulization every 6 hours as needed for shortness of breath, wheezing or cough., Disp-90 mL, R-3, E-Prescribe      BD PEN NEEDLE CALLI 2ND GEN 32G X 4 MM miscellaneous USE AS DIRECTED ONCE PER DAY, ZOILA, Historical      !! blood glucose (NO BRAND SPECIFIED) test strip TEST THREE TIMES DAILY, Historical      !! blood glucose (NO BRAND SPECIFIED) test strip USE TO CHECK BLOOD SUGAR LEVELS THREE TIMES DAILY, Historical      blood glucose monitoring (SOFTCLIX) lancets TEST THREE TIMES DAILY AS DIRECTED, Historical       !! - Potential duplicate medications found. Please discuss with provider.        Allergies   Allergies   Allergen Reactions    Tdvax [Tetanus-Diphtheria Toxoids Td]      Other reaction(s): swelling    Tetanus Toxoid, Adsorbed [Tetanus Toxoid] Swelling    Naproxen Rash     Other reaction(s): rash, nausea

## 2024-10-27 NOTE — PLAN OF CARE
Problem: Gas Exchange Impaired  Goal: Optimal Gas Exchange  10/27/2024 0606 by Marleny Santana, RN  Outcome: Progressing  10/26/2024 2136 by Marleny Santana RN  Outcome: Progressing  Intervention: Optimize Oxygenation and Ventilation  Recent Flowsheet Documentation  Taken 10/27/2024 0352 by Marleny Santana, RN  Head of Bed (HOB) Positioning: HOB at 20-30 degrees  Taken 10/26/2024 2120 by Marleny Santana RN  Head of Bed (HOB) Positioning: HOB at 20-30 degrees   Goal Outcome Evaluation:       Patient A/O x4. Denied pain. VSS on 2L NC. SBA with walker. Lungs sounds coarse. Blood sugars this shift were 310 coverage given and 149. Call light within reach and bed alarm activated.

## 2024-10-27 NOTE — PROGRESS NOTES
Patient has been assessed for Home Oxygen needs.     Pulse oximetry (SpO2) and Oxygen flow readings:    SpO2 = 93% on room air at rest while awake.    SpO2 improved to  (n/a)% on   liters/minute at rest.    SpO2 = 92% on room air during activity/with exercise.    *SpO2 improved to  (N/A)% on   liters/minute during activity/with exercise.    Patient does not qualify for home oxygen.  MD land.

## 2024-10-27 NOTE — PLAN OF CARE
Patient to discharge to home with no needs. Passed home oxygen assessment.  Patient able to talk while having conversation.  IV removed.  States daughter will give him a ride home and be there to support and assist him today. Azithromycin and prednisone form [facility] given to patient.

## 2024-11-06 ENCOUNTER — MEDICAL CORRESPONDENCE (OUTPATIENT)
Dept: HEALTH INFORMATION MANAGEMENT | Facility: CLINIC | Age: 79
End: 2024-11-06
Payer: MEDICARE

## 2024-12-08 ENCOUNTER — APPOINTMENT (OUTPATIENT)
Dept: CT IMAGING | Facility: HOSPITAL | Age: 79
DRG: 871 | End: 2024-12-08
Attending: EMERGENCY MEDICINE
Payer: MEDICARE

## 2024-12-08 ENCOUNTER — TRANSFERRED RECORDS (OUTPATIENT)
Dept: HEALTH INFORMATION MANAGEMENT | Facility: CLINIC | Age: 79
End: 2024-12-08

## 2024-12-08 ENCOUNTER — HOSPITAL ENCOUNTER (INPATIENT)
Facility: HOSPITAL | Age: 79
LOS: 3 days | Discharge: HOME | DRG: 871 | End: 2024-12-11
Attending: EMERGENCY MEDICINE | Admitting: FAMILY MEDICINE
Payer: MEDICARE

## 2024-12-08 DIAGNOSIS — J44.1 COPD EXACERBATION (H): ICD-10-CM

## 2024-12-08 DIAGNOSIS — Z79.4 TYPE 2 DIABETES MELLITUS WITHOUT COMPLICATION, WITH LONG-TERM CURRENT USE OF INSULIN (H): ICD-10-CM

## 2024-12-08 DIAGNOSIS — J96.21 ACUTE ON CHRONIC RESPIRATORY FAILURE WITH HYPOXIA AND HYPERCAPNIA (H): ICD-10-CM

## 2024-12-08 DIAGNOSIS — E11.9 TYPE 2 DIABETES MELLITUS WITHOUT COMPLICATION, WITH LONG-TERM CURRENT USE OF INSULIN (H): ICD-10-CM

## 2024-12-08 DIAGNOSIS — I50.31 ACUTE DIASTOLIC CONGESTIVE HEART FAILURE (H): ICD-10-CM

## 2024-12-08 DIAGNOSIS — J96.22 ACUTE ON CHRONIC RESPIRATORY FAILURE WITH HYPOXIA AND HYPERCAPNIA (H): ICD-10-CM

## 2024-12-08 DIAGNOSIS — I48.0 PAROXYSMAL ATRIAL FIBRILLATION (H): Primary | ICD-10-CM

## 2024-12-08 DIAGNOSIS — A41.9 SEPSIS, DUE TO UNSPECIFIED ORGANISM, UNSPECIFIED WHETHER ACUTE ORGAN DYSFUNCTION PRESENT (H): ICD-10-CM

## 2024-12-08 LAB
ALBUMIN SERPL BCG-MCNC: 4.1 G/DL (ref 3.5–5.2)
ALBUMIN UR-MCNC: 30 MG/DL
ALP SERPL-CCNC: 78 U/L (ref 40–150)
ALT SERPL W P-5'-P-CCNC: 12 U/L (ref 0–70)
AMMONIA PLAS-SCNC: 11 UMOL/L (ref 16–60)
ANION GAP SERPL CALCULATED.3IONS-SCNC: 15 MMOL/L (ref 7–15)
APPEARANCE UR: CLEAR
APTT PPP: 33 SECONDS (ref 22–38)
AST SERPL W P-5'-P-CCNC: 18 U/L (ref 0–45)
BACTERIA #/AREA URNS HPF: ABNORMAL /HPF
BASE EXCESS BLDV CALC-SCNC: 3.3 MMOL/L (ref -3–3)
BASOPHILS # BLD MANUAL: 0 10E3/UL (ref 0–0.2)
BASOPHILS NFR BLD MANUAL: 0 %
BILIRUB SERPL-MCNC: 0.7 MG/DL
BILIRUB UR QL STRIP: NEGATIVE
BUN SERPL-MCNC: 23 MG/DL (ref 8–23)
CALCIUM SERPL-MCNC: 9.2 MG/DL (ref 8.8–10.4)
CHLORIDE SERPL-SCNC: 98 MMOL/L (ref 98–107)
CK SERPL-CCNC: 189 U/L (ref 39–308)
COLOR UR AUTO: ABNORMAL
CREAT SERPL-MCNC: 0.99 MG/DL (ref 0.67–1.17)
EGFRCR SERPLBLD CKD-EPI 2021: 77 ML/MIN/1.73M2
EOSINOPHIL # BLD MANUAL: 0 10E3/UL (ref 0–0.7)
EOSINOPHIL NFR BLD MANUAL: 0 %
ERYTHROCYTE [DISTWIDTH] IN BLOOD BY AUTOMATED COUNT: 14.5 % (ref 10–15)
FLUAV RNA SPEC QL NAA+PROBE: NEGATIVE
FLUBV RNA RESP QL NAA+PROBE: NEGATIVE
GLUCOSE BLDC GLUCOMTR-MCNC: 112 MG/DL (ref 70–99)
GLUCOSE SERPL-MCNC: 93 MG/DL (ref 70–99)
GLUCOSE UR STRIP-MCNC: >1000 MG/DL
HCO3 BLDV-SCNC: 30 MMOL/L (ref 21–28)
HCO3 SERPL-SCNC: 25 MMOL/L (ref 22–29)
HCT VFR BLD AUTO: 49.6 % (ref 40–53)
HGB BLD-MCNC: 15.6 G/DL (ref 13.3–17.7)
HGB UR QL STRIP: ABNORMAL
INR PPP: 1.16 (ref 0.85–1.15)
KETONES UR STRIP-MCNC: 60 MG/DL
LACTATE SERPL-SCNC: 1.4 MMOL/L (ref 0.7–2)
LEUKOCYTE ESTERASE UR QL STRIP: ABNORMAL
LYMPHOCYTES # BLD MANUAL: 2.8 10E3/UL (ref 0.8–5.3)
LYMPHOCYTES NFR BLD MANUAL: 12 %
MCH RBC QN AUTO: 28.7 PG (ref 26.5–33)
MCHC RBC AUTO-ENTMCNC: 31.5 G/DL (ref 31.5–36.5)
MCV RBC AUTO: 91 FL (ref 78–100)
MONOCYTES # BLD MANUAL: 1.4 10E3/UL (ref 0–1.3)
MONOCYTES NFR BLD MANUAL: 6 %
NEUTROPHILS # BLD MANUAL: 18.7 10E3/UL (ref 1.6–8.3)
NEUTROPHILS NFR BLD MANUAL: 81 %
NITRATE UR QL: POSITIVE
NT-PROBNP SERPL-MCNC: 194 PG/ML (ref 0–1800)
O2/TOTAL GAS SETTING VFR VENT: 22 %
OXYHGB MFR BLDV: 34 % (ref 70–75)
PCO2 BLDV: 52 MM HG (ref 40–50)
PH BLDV: 7.37 [PH] (ref 7.32–7.43)
PH UR STRIP: 5.5 [PH] (ref 5–7)
PLASMA CELLS # BLD MANUAL: 0.2 10E3/UL
PLASMA CELLS NFR BLD MANUAL: 1 %
PLAT MORPH BLD: ABNORMAL
PLATELET # BLD AUTO: 177 10E3/UL (ref 150–450)
PO2 BLDV: 22 MM HG (ref 25–47)
POTASSIUM SERPL-SCNC: 5.1 MMOL/L (ref 3.4–5.3)
PROT SERPL-MCNC: 7.7 G/DL (ref 6.4–8.3)
RBC # BLD AUTO: 5.44 10E6/UL (ref 4.4–5.9)
RBC MORPH BLD: ABNORMAL
RBC URINE: 6 /HPF
RSV RNA SPEC NAA+PROBE: NEGATIVE
SAO2 % BLDV: 34.2 % (ref 70–75)
SARS-COV-2 RNA RESP QL NAA+PROBE: NEGATIVE
SODIUM SERPL-SCNC: 138 MMOL/L (ref 135–145)
SP GR UR STRIP: 1.02 (ref 1–1.03)
SQUAMOUS EPITHELIAL: <1 /HPF
TROPONIN T SERPL HS-MCNC: 20 NG/L
TROPONIN T SERPL HS-MCNC: 22 NG/L
UROBILINOGEN UR STRIP-MCNC: <2 MG/DL
WBC # BLD AUTO: 23.1 10E3/UL (ref 4–11)
WBC URINE: 155 /HPF

## 2024-12-08 PROCEDURE — 93005 ELECTROCARDIOGRAM TRACING: CPT | Performed by: EMERGENCY MEDICINE

## 2024-12-08 PROCEDURE — 83880 ASSAY OF NATRIURETIC PEPTIDE: CPT | Performed by: EMERGENCY MEDICINE

## 2024-12-08 PROCEDURE — 250N000011 HC RX IP 250 OP 636: Performed by: EMERGENCY MEDICINE

## 2024-12-08 PROCEDURE — 85730 THROMBOPLASTIN TIME PARTIAL: CPT | Performed by: EMERGENCY MEDICINE

## 2024-12-08 PROCEDURE — 85027 COMPLETE CBC AUTOMATED: CPT | Performed by: EMERGENCY MEDICINE

## 2024-12-08 PROCEDURE — 81001 URINALYSIS AUTO W/SCOPE: CPT | Performed by: EMERGENCY MEDICINE

## 2024-12-08 PROCEDURE — 250N000013 HC RX MED GY IP 250 OP 250 PS 637: Performed by: EMERGENCY MEDICINE

## 2024-12-08 PROCEDURE — 250N000009 HC RX 250: Performed by: EMERGENCY MEDICINE

## 2024-12-08 PROCEDURE — 87088 URINE BACTERIA CULTURE: CPT | Performed by: EMERGENCY MEDICINE

## 2024-12-08 PROCEDURE — 258N000003 HC RX IP 258 OP 636: Performed by: FAMILY MEDICINE

## 2024-12-08 PROCEDURE — 99285 EMERGENCY DEPT VISIT HI MDM: CPT | Mod: 25

## 2024-12-08 PROCEDURE — 99223 1ST HOSP IP/OBS HIGH 75: CPT | Mod: AI | Performed by: FAMILY MEDICINE

## 2024-12-08 PROCEDURE — 96367 TX/PROPH/DG ADDL SEQ IV INF: CPT

## 2024-12-08 PROCEDURE — 94640 AIRWAY INHALATION TREATMENT: CPT

## 2024-12-08 PROCEDURE — 258N000003 HC RX IP 258 OP 636: Performed by: EMERGENCY MEDICINE

## 2024-12-08 PROCEDURE — 83605 ASSAY OF LACTIC ACID: CPT | Performed by: EMERGENCY MEDICINE

## 2024-12-08 PROCEDURE — 84450 TRANSFERASE (AST) (SGOT): CPT | Performed by: EMERGENCY MEDICINE

## 2024-12-08 PROCEDURE — G1010 CDSM STANSON: HCPCS

## 2024-12-08 PROCEDURE — 96375 TX/PRO/DX INJ NEW DRUG ADDON: CPT

## 2024-12-08 PROCEDURE — 87186 SC STD MICRODIL/AGAR DIL: CPT | Performed by: EMERGENCY MEDICINE

## 2024-12-08 PROCEDURE — 85610 PROTHROMBIN TIME: CPT | Performed by: EMERGENCY MEDICINE

## 2024-12-08 PROCEDURE — 84484 ASSAY OF TROPONIN QUANT: CPT | Performed by: EMERGENCY MEDICINE

## 2024-12-08 PROCEDURE — 82550 ASSAY OF CK (CPK): CPT | Performed by: EMERGENCY MEDICINE

## 2024-12-08 PROCEDURE — 120N000001 HC R&B MED SURG/OB

## 2024-12-08 PROCEDURE — 82962 GLUCOSE BLOOD TEST: CPT

## 2024-12-08 PROCEDURE — 74177 CT ABD & PELVIS W/CONTRAST: CPT | Mod: MG

## 2024-12-08 PROCEDURE — 71275 CT ANGIOGRAPHY CHEST: CPT | Mod: MF

## 2024-12-08 PROCEDURE — 999N000157 HC STATISTIC RCP TIME EA 10 MIN

## 2024-12-08 PROCEDURE — 87040 BLOOD CULTURE FOR BACTERIA: CPT | Performed by: EMERGENCY MEDICINE

## 2024-12-08 PROCEDURE — 82805 BLOOD GASES W/O2 SATURATION: CPT | Performed by: EMERGENCY MEDICINE

## 2024-12-08 PROCEDURE — 85007 BL SMEAR W/DIFF WBC COUNT: CPT | Performed by: EMERGENCY MEDICINE

## 2024-12-08 PROCEDURE — 72125 CT NECK SPINE W/O DYE: CPT | Mod: ME

## 2024-12-08 PROCEDURE — 36415 COLL VENOUS BLD VENIPUNCTURE: CPT | Performed by: EMERGENCY MEDICINE

## 2024-12-08 PROCEDURE — 84155 ASSAY OF PROTEIN SERUM: CPT | Performed by: EMERGENCY MEDICINE

## 2024-12-08 PROCEDURE — 70450 CT HEAD/BRAIN W/O DYE: CPT | Mod: MG

## 2024-12-08 PROCEDURE — 96365 THER/PROPH/DIAG IV INF INIT: CPT | Mod: 59

## 2024-12-08 PROCEDURE — 82140 ASSAY OF AMMONIA: CPT | Performed by: EMERGENCY MEDICINE

## 2024-12-08 PROCEDURE — 87637 SARSCOV2&INF A&B&RSV AMP PRB: CPT | Performed by: EMERGENCY MEDICINE

## 2024-12-08 RX ORDER — ACETAMINOPHEN 500 MG
1000 TABLET ORAL ONCE
Status: COMPLETED | OUTPATIENT
Start: 2024-12-08 | End: 2024-12-08

## 2024-12-08 RX ORDER — IPRATROPIUM BROMIDE AND ALBUTEROL SULFATE 2.5; .5 MG/3ML; MG/3ML
3 SOLUTION RESPIRATORY (INHALATION) EVERY 4 HOURS PRN
Status: DISCONTINUED | OUTPATIENT
Start: 2024-12-08 | End: 2024-12-11 | Stop reason: HOSPADM

## 2024-12-08 RX ORDER — TAMSULOSIN HYDROCHLORIDE 0.4 MG/1
0.4 CAPSULE ORAL DAILY
COMMUNITY

## 2024-12-08 RX ORDER — LIDOCAINE 40 MG/G
CREAM TOPICAL
Status: DISCONTINUED | OUTPATIENT
Start: 2024-12-08 | End: 2024-12-11 | Stop reason: HOSPADM

## 2024-12-08 RX ORDER — NICOTINE POLACRILEX 4 MG
15-30 LOZENGE BUCCAL
Status: DISCONTINUED | OUTPATIENT
Start: 2024-12-08 | End: 2024-12-11 | Stop reason: HOSPADM

## 2024-12-08 RX ORDER — IPRATROPIUM BROMIDE AND ALBUTEROL SULFATE 2.5; .5 MG/3ML; MG/3ML
3 SOLUTION RESPIRATORY (INHALATION) ONCE
Status: COMPLETED | OUTPATIENT
Start: 2024-12-08 | End: 2024-12-08

## 2024-12-08 RX ORDER — ASPIRIN 81 MG/1
81 TABLET ORAL EVERY OTHER DAY
Status: ON HOLD | COMMUNITY
End: 2024-12-11

## 2024-12-08 RX ORDER — PIPERACILLIN SODIUM, TAZOBACTAM SODIUM 3; .375 G/15ML; G/15ML
3.38 INJECTION, POWDER, LYOPHILIZED, FOR SOLUTION INTRAVENOUS ONCE
Status: COMPLETED | OUTPATIENT
Start: 2024-12-08 | End: 2024-12-08

## 2024-12-08 RX ORDER — METHYLPREDNISOLONE SODIUM SUCCINATE 125 MG/2ML
125 INJECTION INTRAMUSCULAR; INTRAVENOUS ONCE
Status: COMPLETED | OUTPATIENT
Start: 2024-12-08 | End: 2024-12-08

## 2024-12-08 RX ORDER — DEXTROSE MONOHYDRATE 25 G/50ML
25-50 INJECTION, SOLUTION INTRAVENOUS
Status: DISCONTINUED | OUTPATIENT
Start: 2024-12-08 | End: 2024-12-11 | Stop reason: HOSPADM

## 2024-12-08 RX ORDER — IOPAMIDOL 755 MG/ML
90 INJECTION, SOLUTION INTRAVASCULAR ONCE
Status: COMPLETED | OUTPATIENT
Start: 2024-12-08 | End: 2024-12-08

## 2024-12-08 RX ORDER — LIDOCAINE 40 MG/G
CREAM TOPICAL
Status: DISCONTINUED | OUTPATIENT
Start: 2024-12-08 | End: 2024-12-09

## 2024-12-08 RX ORDER — SODIUM CHLORIDE 9 MG/ML
INJECTION, SOLUTION INTRAVENOUS CONTINUOUS
Status: DISCONTINUED | OUTPATIENT
Start: 2024-12-08 | End: 2024-12-09

## 2024-12-08 RX ORDER — CEFTRIAXONE 2 G/1
2 INJECTION, POWDER, FOR SOLUTION INTRAMUSCULAR; INTRAVENOUS EVERY 24 HOURS
Status: DISCONTINUED | OUTPATIENT
Start: 2024-12-09 | End: 2024-12-11 | Stop reason: HOSPADM

## 2024-12-08 RX ADMIN — IPRATROPIUM BROMIDE AND ALBUTEROL SULFATE 3 ML: .5; 3 SOLUTION RESPIRATORY (INHALATION) at 20:14

## 2024-12-08 RX ADMIN — SODIUM CHLORIDE 1000 ML: 9 INJECTION, SOLUTION INTRAVENOUS at 21:43

## 2024-12-08 RX ADMIN — METHYLPREDNISOLONE SODIUM SUCCINATE 125 MG: 125 INJECTION, POWDER, FOR SOLUTION INTRAMUSCULAR; INTRAVENOUS at 20:18

## 2024-12-08 RX ADMIN — ACETAMINOPHEN 1000 MG: 500 TABLET ORAL at 20:15

## 2024-12-08 RX ADMIN — IOPAMIDOL 90 ML: 755 INJECTION, SOLUTION INTRAVENOUS at 20:59

## 2024-12-08 RX ADMIN — SODIUM CHLORIDE: 9 INJECTION, SOLUTION INTRAVENOUS at 23:44

## 2024-12-08 RX ADMIN — PIPERACILLIN AND TAZOBACTAM 3.38 G: 3; .375 INJECTION, POWDER, FOR SOLUTION INTRAVENOUS at 20:20

## 2024-12-08 RX ADMIN — SODIUM CHLORIDE 1500 MG: 9 INJECTION, SOLUTION INTRAVENOUS at 21:17

## 2024-12-08 ASSESSMENT — ACTIVITIES OF DAILY LIVING (ADL)
ADLS_ACUITY_SCORE: 61

## 2024-12-09 ENCOUNTER — TRANSFERRED RECORDS (OUTPATIENT)
Dept: HEALTH INFORMATION MANAGEMENT | Facility: CLINIC | Age: 79
End: 2024-12-09

## 2024-12-09 ENCOUNTER — APPOINTMENT (OUTPATIENT)
Dept: OCCUPATIONAL THERAPY | Facility: HOSPITAL | Age: 79
DRG: 871 | End: 2024-12-09
Attending: HOSPITALIST
Payer: MEDICARE

## 2024-12-09 ENCOUNTER — APPOINTMENT (OUTPATIENT)
Dept: CARDIOLOGY | Facility: HOSPITAL | Age: 79
DRG: 871 | End: 2024-12-09
Attending: HOSPITALIST
Payer: MEDICARE

## 2024-12-09 LAB
ALBUMIN SERPL BCG-MCNC: 3.9 G/DL (ref 3.5–5.2)
ALP SERPL-CCNC: 77 U/L (ref 40–150)
ALT SERPL W P-5'-P-CCNC: 13 U/L (ref 0–70)
ANION GAP SERPL CALCULATED.3IONS-SCNC: 16 MMOL/L (ref 7–15)
AST SERPL W P-5'-P-CCNC: 18 U/L (ref 0–45)
BASOPHILS # BLD MANUAL: 0 10E3/UL (ref 0–0.2)
BASOPHILS NFR BLD MANUAL: 0 %
BILIRUB SERPL-MCNC: 0.4 MG/DL
BUN SERPL-MCNC: 27.4 MG/DL (ref 8–23)
CALCIUM SERPL-MCNC: 8.3 MG/DL (ref 8.8–10.4)
CHLORIDE SERPL-SCNC: 100 MMOL/L (ref 98–107)
CREAT SERPL-MCNC: 0.84 MG/DL (ref 0.67–1.17)
EGFRCR SERPLBLD CKD-EPI 2021: 88 ML/MIN/1.73M2
EOSINOPHIL # BLD MANUAL: 0 10E3/UL (ref 0–0.7)
EOSINOPHIL NFR BLD MANUAL: 0 %
ERYTHROCYTE [DISTWIDTH] IN BLOOD BY AUTOMATED COUNT: 14.6 % (ref 10–15)
GLUCOSE BLDC GLUCOMTR-MCNC: 153 MG/DL (ref 70–99)
GLUCOSE BLDC GLUCOMTR-MCNC: 238 MG/DL (ref 70–99)
GLUCOSE BLDC GLUCOMTR-MCNC: 241 MG/DL (ref 70–99)
GLUCOSE BLDC GLUCOMTR-MCNC: 270 MG/DL (ref 70–99)
GLUCOSE SERPL-MCNC: 259 MG/DL (ref 70–99)
HCO3 SERPL-SCNC: 22 MMOL/L (ref 22–29)
HCT VFR BLD AUTO: 48.5 % (ref 40–53)
HGB BLD-MCNC: 14.9 G/DL (ref 13.3–17.7)
LVEF ECHO: NORMAL
LYMPHOCYTES # BLD MANUAL: 2.4 10E3/UL (ref 0.8–5.3)
LYMPHOCYTES NFR BLD MANUAL: 11 %
MCH RBC QN AUTO: 28.7 PG (ref 26.5–33)
MCHC RBC AUTO-ENTMCNC: 30.7 G/DL (ref 31.5–36.5)
MCV RBC AUTO: 93 FL (ref 78–100)
MONOCYTES # BLD MANUAL: 0.7 10E3/UL (ref 0–1.3)
MONOCYTES NFR BLD MANUAL: 3 %
NEUTROPHILS # BLD MANUAL: 19 10E3/UL (ref 1.6–8.3)
NEUTROPHILS NFR BLD MANUAL: 86 %
PLAT MORPH BLD: ABNORMAL
PLATELET # BLD AUTO: 181 10E3/UL (ref 150–450)
POTASSIUM SERPL-SCNC: 5.2 MMOL/L (ref 3.4–5.3)
PROT SERPL-MCNC: 7.2 G/DL (ref 6.4–8.3)
RBC # BLD AUTO: 5.19 10E6/UL (ref 4.4–5.9)
RBC MORPH BLD: ABNORMAL
SODIUM SERPL-SCNC: 138 MMOL/L (ref 135–145)
WBC # BLD AUTO: 22.1 10E3/UL (ref 4–11)

## 2024-12-09 PROCEDURE — 85014 HEMATOCRIT: CPT | Performed by: FAMILY MEDICINE

## 2024-12-09 PROCEDURE — 120N000001 HC R&B MED SURG/OB

## 2024-12-09 PROCEDURE — 82962 GLUCOSE BLOOD TEST: CPT

## 2024-12-09 PROCEDURE — 250N000012 HC RX MED GY IP 250 OP 636 PS 637: Performed by: FAMILY MEDICINE

## 2024-12-09 PROCEDURE — 97530 THERAPEUTIC ACTIVITIES: CPT | Mod: GO

## 2024-12-09 PROCEDURE — 93306 TTE W/DOPPLER COMPLETE: CPT | Mod: 26 | Performed by: INTERNAL MEDICINE

## 2024-12-09 PROCEDURE — 97165 OT EVAL LOW COMPLEX 30 MIN: CPT | Mod: GO

## 2024-12-09 PROCEDURE — 85041 AUTOMATED RBC COUNT: CPT | Performed by: FAMILY MEDICINE

## 2024-12-09 PROCEDURE — 36415 COLL VENOUS BLD VENIPUNCTURE: CPT | Performed by: FAMILY MEDICINE

## 2024-12-09 PROCEDURE — 250N000013 HC RX MED GY IP 250 OP 250 PS 637: Performed by: HOSPITALIST

## 2024-12-09 PROCEDURE — 93306 TTE W/DOPPLER COMPLETE: CPT

## 2024-12-09 PROCEDURE — 82565 ASSAY OF CREATININE: CPT | Performed by: FAMILY MEDICINE

## 2024-12-09 PROCEDURE — 99232 SBSQ HOSP IP/OBS MODERATE 35: CPT | Performed by: HOSPITALIST

## 2024-12-09 PROCEDURE — 250N000011 HC RX IP 250 OP 636: Performed by: HOSPITALIST

## 2024-12-09 PROCEDURE — 250N000011 HC RX IP 250 OP 636: Performed by: FAMILY MEDICINE

## 2024-12-09 PROCEDURE — 93005 ELECTROCARDIOGRAM TRACING: CPT | Performed by: HOSPITALIST

## 2024-12-09 PROCEDURE — 250N000012 HC RX MED GY IP 250 OP 636 PS 637: Performed by: HOSPITALIST

## 2024-12-09 PROCEDURE — 85007 BL SMEAR W/DIFF WBC COUNT: CPT | Performed by: FAMILY MEDICINE

## 2024-12-09 PROCEDURE — 84155 ASSAY OF PROTEIN SERUM: CPT | Performed by: FAMILY MEDICINE

## 2024-12-09 RX ORDER — AZITHROMYCIN 250 MG/1
500 TABLET, FILM COATED ORAL ONCE
Status: COMPLETED | OUTPATIENT
Start: 2024-12-09 | End: 2024-12-09

## 2024-12-09 RX ORDER — TAMSULOSIN HYDROCHLORIDE 0.4 MG/1
0.4 CAPSULE ORAL DAILY
Status: DISCONTINUED | OUTPATIENT
Start: 2024-12-09 | End: 2024-12-11 | Stop reason: HOSPADM

## 2024-12-09 RX ORDER — CETIRIZINE HYDROCHLORIDE 10 MG/1
10 TABLET ORAL DAILY
Status: DISCONTINUED | OUTPATIENT
Start: 2024-12-09 | End: 2024-12-11 | Stop reason: HOSPADM

## 2024-12-09 RX ORDER — ALBUTEROL SULFATE 0.83 MG/ML
2.5 SOLUTION RESPIRATORY (INHALATION) EVERY 6 HOURS PRN
Status: DISCONTINUED | OUTPATIENT
Start: 2024-12-09 | End: 2024-12-11 | Stop reason: HOSPADM

## 2024-12-09 RX ORDER — METOPROLOL TARTRATE 25 MG/1
25 TABLET, FILM COATED ORAL 2 TIMES DAILY
Status: DISCONTINUED | OUTPATIENT
Start: 2024-12-09 | End: 2024-12-11 | Stop reason: HOSPADM

## 2024-12-09 RX ORDER — FUROSEMIDE 10 MG/ML
20 INJECTION INTRAMUSCULAR; INTRAVENOUS 2 TIMES DAILY
Status: DISCONTINUED | OUTPATIENT
Start: 2024-12-09 | End: 2024-12-10

## 2024-12-09 RX ORDER — ACETAMINOPHEN 325 MG/1
650 TABLET ORAL EVERY 6 HOURS PRN
Status: DISCONTINUED | OUTPATIENT
Start: 2024-12-09 | End: 2024-12-11 | Stop reason: HOSPADM

## 2024-12-09 RX ORDER — PRIMIDONE 50 MG/1
50 TABLET ORAL AT BEDTIME
Status: DISCONTINUED | OUTPATIENT
Start: 2024-12-09 | End: 2024-12-11 | Stop reason: HOSPADM

## 2024-12-09 RX ORDER — SIMVASTATIN 10 MG
40 TABLET ORAL EVERY EVENING
Status: DISCONTINUED | OUTPATIENT
Start: 2024-12-09 | End: 2024-12-11 | Stop reason: HOSPADM

## 2024-12-09 RX ORDER — FLUTICASONE FUROATE AND VILANTEROL 200; 25 UG/1; UG/1
1 POWDER RESPIRATORY (INHALATION) DAILY
Status: DISCONTINUED | OUTPATIENT
Start: 2024-12-09 | End: 2024-12-11 | Stop reason: HOSPADM

## 2024-12-09 RX ORDER — MULTIVITAMIN WITH IRON
250 TABLET ORAL AT BEDTIME
Status: DISCONTINUED | OUTPATIENT
Start: 2024-12-09 | End: 2024-12-09

## 2024-12-09 RX ORDER — ALBUTEROL SULFATE 90 UG/1
2 INHALANT RESPIRATORY (INHALATION) EVERY 4 HOURS PRN
Status: DISCONTINUED | OUTPATIENT
Start: 2024-12-09 | End: 2024-12-11 | Stop reason: HOSPADM

## 2024-12-09 RX ORDER — AZITHROMYCIN 250 MG/1
250 TABLET, FILM COATED ORAL DAILY
Status: DISCONTINUED | OUTPATIENT
Start: 2024-12-10 | End: 2024-12-11 | Stop reason: HOSPADM

## 2024-12-09 RX ADMIN — INSULIN ASPART 4 UNITS: 100 INJECTION, SOLUTION INTRAVENOUS; SUBCUTANEOUS at 13:01

## 2024-12-09 RX ADMIN — FLUTICASONE FUROATE AND VILANTEROL TRIFENATATE 1 PUFF: 200; 25 POWDER RESPIRATORY (INHALATION) at 09:52

## 2024-12-09 RX ADMIN — APIXABAN 5 MG: 5 TABLET, FILM COATED ORAL at 22:32

## 2024-12-09 RX ADMIN — PRIMIDONE 50 MG: 50 TABLET ORAL at 22:28

## 2024-12-09 RX ADMIN — CEFTRIAXONE SODIUM 2 G: 2 INJECTION, POWDER, FOR SOLUTION INTRAMUSCULAR; INTRAVENOUS at 05:22

## 2024-12-09 RX ADMIN — AZITHROMYCIN DIHYDRATE 500 MG: 250 TABLET ORAL at 16:13

## 2024-12-09 RX ADMIN — INSULIN GLARGINE 40 UNITS: 100 INJECTION, SOLUTION SUBCUTANEOUS at 22:30

## 2024-12-09 RX ADMIN — INSULIN ASPART 6 UNITS: 100 INJECTION, SOLUTION INTRAVENOUS; SUBCUTANEOUS at 17:17

## 2024-12-09 RX ADMIN — TAMSULOSIN HYDROCHLORIDE 0.4 MG: 0.4 CAPSULE ORAL at 09:30

## 2024-12-09 RX ADMIN — UMECLIDINIUM 1 PUFF: 62.5 AEROSOL, POWDER ORAL at 09:52

## 2024-12-09 RX ADMIN — METOPROLOL TARTRATE 25 MG: 25 TABLET, FILM COATED ORAL at 09:30

## 2024-12-09 RX ADMIN — MAGNESIUM OXIDE TAB 400 MG (241.3 MG ELEMENTAL MG) 200 MG: 400 (241.3 MG) TAB at 22:29

## 2024-12-09 RX ADMIN — FUROSEMIDE 20 MG: 10 INJECTION, SOLUTION INTRAMUSCULAR; INTRAVENOUS at 09:31

## 2024-12-09 RX ADMIN — CETIRIZINE HYDROCHLORIDE 10 MG: 10 TABLET, FILM COATED ORAL at 09:30

## 2024-12-09 RX ADMIN — INSULIN ASPART 1 UNITS: 100 INJECTION, SOLUTION INTRAVENOUS; SUBCUTANEOUS at 07:38

## 2024-12-09 RX ADMIN — SIMVASTATIN 40 MG: 40 TABLET, FILM COATED ORAL at 22:29

## 2024-12-09 ASSESSMENT — ACTIVITIES OF DAILY LIVING (ADL)
ADLS_ACUITY_SCORE: 61
ADLS_ACUITY_SCORE: 64
ADLS_ACUITY_SCORE: 63
ADLS_ACUITY_SCORE: 61
ADLS_ACUITY_SCORE: 64
ADLS_ACUITY_SCORE: 61
ADLS_ACUITY_SCORE: 64
ADLS_ACUITY_SCORE: 61
ADLS_ACUITY_SCORE: 63
ADLS_ACUITY_SCORE: 61
ADLS_ACUITY_SCORE: 64
IADL_COMMENTS: INDEPENDENT WITH IADLS.

## 2024-12-09 NOTE — PROGRESS NOTES
"   12/09/24 1135   Appointment Info   Signing Clinician's Name / Credentials (OT) Stella Adelaide, OTR/L   Living Environment   People in Home alone   Current Living Arrangements other (see comments)  ([facility])   Living Environment Comments Has a dog   Self-Care   Regular Exercise Yes  (short walks with his dog)   Equipment Currently Used at Home none   Fall history within last six months yes   Number of times patient has fallen within last six months 2   Activity/Exercise/Self-Care Comment Independent with ADLs.   Instrumental Activities of Daily Living (IADL)   IADL Comments Independent with IADLs.   General Information   Onset of Illness/Injury or Date of Surgery 12/08/24   Referring Physician Elis Hampton MD   Patient/Family Therapy Goal Statement (OT) to go home   Additional Occupational Profile Info/Pertinent History of Current Problem Per chart review, pt \"is 79M with DM2, COPD, bladder cancer (s/p bladder surgery 2023) and umbilical hernia who is admitted on 12/8/2024 with Severe Sepsis 2/2 UTI, hypotension, fever, leukocytosis, AMS, fall versus syncope at home.\"   Existing Precautions/Restrictions fall   Cognitive Status Examination   Orientation Status orientation to person, place and time  (A&Ox4)   Pain Assessment   Patient Currently in Pain No   Range of Motion Comprehensive   General Range of Motion no range of motion deficits identified   Strength Comprehensive (MMT)   General Manual Muscle Testing (MMT) Assessment no strength deficits identified   Transfers   Transfers sit-stand transfer;toilet transfer   Sit-Stand Transfer   Sit-Stand [place] (Transfers) supervision   Toilet Transfer   Type (Toilet Transfer) sit-stand;stand-sit   [place] Level (Toilet Transfer) supervision   Balance   Balance Comments Pt slightly unsteady on feet with ambulation with no AD, no LOB. Pt feels he is at baseline with mobility.   Activities of Daily Living   BADL Assessment/Intervention grooming;toileting "   Grooming Assessment/Training   Position (Grooming) sink side;unsupported standing   [place] Level (Grooming) supervision   Toileting   [place] Level (Toileting) supervision   Clinical Impression   Criteria for Skilled Therapeutic Interventions Met (OT) Yes, treatment indicated   OT Diagnosis Impaired functional endurance needed for ADLs, IADLs, and functional mobility.   Influenced by the following impairments COPD exacerbation   OT Problem List-Impairments impacting ADL problems related to;activity tolerance impaired;mobility   Assessment of Occupational Performance 1-3 Performance Deficits   Identified Performance Deficits functional endurance   Planned Therapy Interventions (OT) home program guidelines;progressive activity/exercise;risk factor education   Clinical Decision Making Complexity (OT) problem focused assessment/low complexity   Risk & Benefits of therapy have been explained evaluation/treatment results reviewed;care plan/treatment goals reviewed;risks/benefits reviewed;current/potential barriers reviewed;participants voiced agreement with care plan;participants included;patient   OT Total Evaluation Time   OT Eval, Low Complexity Minutes (19945) 12   OT Goals   Therapy Frequency (OT) One time eval and treatment   OT Predicted Duration/Target Date for Goal Attainment 12/16/24   Interventions   Interventions Quick Adds Therapeutic Activity   Therapeutic Activities   Therapeutic Activity Minutes (38775) 8   Symptoms noted during/after treatment fatigue   Treatment Detail/Skilled Intervention Pt ambulated in hallway ~150' with SBA-CGA no AD. Pt slightly unsteady at times with no LOB, reports fatigue in BLEs with ambulation. Vitals before walk: 127/64 BP, HR 97 bpm. Vitals after walk: 116/62,  bpm. Pt denies SOB or dizziness with ambulation. Additional 30' x 2 with SBA for ambulation to/from bathroom.   OT Discharge Planning   OT Plan discharge OT   OT Discharge Recommendation (DC Rec)  home with assist   OT Rationale for DC Rec Pt moving and completing ADLs close to baseline. Recommend check-ins from family as pt lives alone.   OT Brief overview of current status SBA-CGA functional mobility and ADLs   Total Session Time   Timed Code Treatment Minutes 8   Total Session Time (sum of timed and untimed services) 20

## 2024-12-09 NOTE — ED PROVIDER NOTES
EMERGENCY DEPARTMENT ENCOUNTER      NAME: Kelechi Kendrick  AGE: 79 year old male  YOB: 1945  MRN: [id number]  EVALUATION DATE & TIME: 12/8/2024  6:57 PM    PCP: Ramiro Person    ED PROVIDER: Melody Dunbar MD      Chief Complaint   Patient presents with    Fall    Cough    Shortness of Breath         FINAL IMPRESSION:  No diagnosis found.      ED COURSE & MEDICAL DECISION MAKING:    Pertinent Labs & Imaging studies reviewed. (See chart for details)    7:17 PM I introduced myself to the patient, obtained patient history, performed a physical exam, and discussed plan for ED workup including potential diagnostic laboratory/imaging studies and interventions.    79 year old male presents to the Emergency Department for evaluation of ***         At the conclusion of the encounter I discussed the results of all of the tests and the disposition. The questions were answered. The patient or family acknowledged understanding and was agreeable with the care plan.     *** minutes of critical care time       Medical Decision Making  Obtained supplemental history:Supplemental history obtained?: Documented in chart and Family Member/Significant Other  Reviewed external records: External records reviewed?: Documented in chart  Care impacted by chronic illness:Cancer/Chemotherapy, Diabetes, Heart Disease, Hyperlipidemia, Hypertension, and Smoking / Nicotine Use  Care significantly affected by social determinants of health:Access to Medical Care  Did you consider but not order tests?: Work up considered but not performed and documented in chart, if applicable  Did you interpret images independently?: Independent interpretation of ECG and images noted in documentation, when applicable.  Consultation discussion with other provider:{Did you involve another provider (consultant, , pharmacy, etc.)?:[id number]}  {ADMIT VS D/C:357509}    {[facility] DOCUMENTATION:509332}      MEDICATIONS GIVEN IN THE  EMERGENCY:  Medications - No data to display    NEW PRESCRIPTIONS STARTED AT TODAY'S ER VISIT  New Prescriptions    No medications on file          =================================================================    HPI    Patient information was obtained from: Patient and Patient's Family    Use of : N/A        Kelechi Kendrick is a 79 year old male with a pertinent history of HTN, HLD, COPD, bladder cancer, umbilical hernia with obstruction, diabetes, UTI, pulmonary emphysema, and nicotine dependence who presents to this ED for evaluation of fall. Patient fell on the floor sometime today, but does not remember what time. He lost his balance and fell onto the couch, then slid down to the floor. He was unable to get up like he usually would due to feeling very weak and tired these days which is unusual. Family reports patient has had a cough for months with no phlegm that isn't getting better. He uses an inhaler and nebulizer at home, but does not use oxygen at home. Family adds patient is shaking with is new and unusual. Per neighbors, patient was normal yesterday. Family confirms patients feet are a little swollen and seems to be confused. Patient denies shortness of breath, chest pain, neck pain, back pain, abdominal pain, nausea, vomiting, diarrhea, leg pain, and history of blood clots.      REVIEW OF SYSTEMS   Pertinent positives and negatives are documented in the HPI. All other systems reviewed and are negative.      PAST MEDICAL HISTORY:  Past Medical History:   Diagnosis Date    Bladder cancer (H)     COPD (chronic obstructive pulmonary disease) (H)     Diabetes mellitus, type II (H)     Dupuytren contracture     HLD (hyperlipidemia)     Hyperlipidemia     Hypertension     Personal history of malignant neoplasm of bladder     Plantar fasciitis     right    Tremor     Umbilical hernia with obstruction     Urinary tract infection        PAST SURGICAL HISTORY:  Past Surgical History:    Procedure Laterality Date    CYSTOSCOPY, TRANSURETHRAL RESECTION (TUR) TUMOR BLADDER, COMBINED N/A 10/10/2023    Procedure: CYSTOSCOPY, BIPOLAR TRANSURETHRAL RESECTION BLADDER TUMOR;  Surgeon: Derik Hernández MD;  Location: [facility] OR    CYSTOSCOPY, TRANSURETHRAL RESECTION (TUR) TUMOR BLADDER, COMBINED N/A 3/11/2024    Procedure: CYSTOSCOPY, TRANSURETHRAL RESECTION BLADDER TUMOR;  Surgeon: Derik Hernández MD;  Location: [facility] OR    HERNIA REPAIR      LEFT TRIGGER THUMB SURGICAL RELEASE Left 08/24/2010    Dr. Reddy    ORTHOPEDIC SURGERY             CURRENT MEDICATIONS:    acetaminophen (TYLENOL) 500 MG tablet  albuterol (PROAIR HFA/PROVENTIL HFA/VENTOLIN HFA) 108 (90 Base) MCG/ACT inhaler  albuterol (PROVENTIL) (2.5 MG/3ML) 0.083% neb solution  aspirin 81 MG EC tablet  BD PEN NEEDLE CALLI 2ND GEN 32G X 4 MM miscellaneous  blood glucose (NO BRAND SPECIFIED) test strip  blood glucose (NO BRAND SPECIFIED) test strip  blood glucose monitoring (SOFTCLIX) lancets  cetirizine (ZYRTEC) 10 MG tablet  empagliflozin (JARDIANCE) 25 MG TABS tablet  fluticasone (FLONASE) 50 MCG/ACT nasal spray  Fluticasone-Umeclidin-Vilanterol (TRELEGY ELLIPTA) 200-62.5-25 MCG/ACT oral inhaler  insulin detemir (LEVEMIR PEN) 100 UNIT/ML pen  lisinopril (PRINIVIL,ZESTRIL) 10 MG tablet  magnesium 250 MG tablet  metFORMIN (GLUCOPHAGE) 500 MG tablet  primidone (MYSOLINE) 50 MG tablet  simvastatin (ZOCOR) 40 MG tablet        ALLERGIES:  Allergies   Allergen Reactions    Tdvax [Tetanus-Diphtheria Toxoids Td]      Other reaction(s): swelling    Tetanus Toxoid, Adsorbed [Tetanus Toxoid] Swelling    Naproxen Rash     Other reaction(s): rash, nausea       FAMILY HISTORY:  No family history on file.    SOCIAL HISTORY:   Social History     Socioeconomic History    Marital status:    Tobacco Use    Smoking status: Former     Current packs/day: 0.40     Average packs/day: 0.4 packs/day for 30.0 years (12.0 ttl pk-yrs)      "Types: Cigarettes    Smokeless tobacco: Never   Vaping Use    Vaping status: Never Used   Substance and Sexual Activity    Alcohol use: No    Drug use: No     Social Drivers of Health     Financial Resource Strain: Low Risk  (10/25/2024)    Financial Resource Strain     Within the past 12 months, have you or your family members you live with been unable to get utilities (heat, electricity) when it was really needed?: No   Food Insecurity: Low Risk  (10/25/2024)    Food Insecurity     Within the past 12 months, did you worry that your food would run out before you got money to buy more?: No     Within the past 12 months, did the food you bought just not last and you didn t have money to get more?: No   Transportation Needs: Low Risk  (10/25/2024)    Transportation Needs     Within the past 12 months, has lack of transportation kept you from medical appointments, getting your medicines, non-medical meetings or appointments, work, or from getting things that you need?: No    [facility]    Social Connections   Interpersonal Safety: High Risk (10/25/2024)    Interpersonal Safety     Do you feel physically and emotionally safe where you currently live?: No     Within the past 12 months, have you been hit, slapped, kicked or otherwise physically hurt by someone?: No     Within the past 12 months, have you been humiliated or emotionally abused in other ways by your partner or ex-partner?: No   Housing Stability: Low Risk  (10/25/2024)    Housing Stability     Do you have housing? : Yes     Are you worried about losing your housing?: No       VITALS:  BP (!) 141/67   Pulse (!) 126   Temp 100  F (37.8  C) (Oral)   Resp 30   Ht 1.651 m (5' 5\")   Wt 80.5 kg (177 lb 8 oz)   SpO2 94%   BMI 29.54 kg/m      PHYSICAL EXAM    Physical Exam  Constitutional: Well developed, Well nourished, NAD, GCS ***  HENT: Normocephalic, Atraumatic, " Bilateral external ears normal, Oropharynx normal, mucous membranes moist, Nose normal. Neck- *** Normal range of motion, No tenderness, Supple, No stridor. ***   Eyes: PERRL, EOMI, Conjunctiva normal, No discharge.   Respiratory: Normal breath sounds, No respiratory distress, No wheezing or crackles, Speaks in full sentences easily. ***   Cardiovascular: Normal heart rate, Regular rhythm, *** No murmurs, No rubs, No gallops. 2+ radial pulses bilaterally*** No reproducible chest tenderness. ***   GI: Bowel sounds normal, Soft, No tenderness, No masses, No rebound or guarding. No CVA tenderness bilaterally ***   : Chaperone ***   Musculoskeletal: 2+ DP pulses. No notable lower extremity edema. *** No cyanosis, No clubbing. Good range of motion in all major joints. No tenderness to palpation or major deformities noted. No tenderness of the CTLS spine. ***   Integument: Warm, Dry, No erythema, No rash. No petechiae. ***   Lymphatic: ***   Neurologic: Alert & oriented x 3, 5/5 strength in all 4 extremities bilaterally. Sensation intact to light touch in all 4 extremities and the face bilaterally. No focal deficits noted. Normal gait. ***    Psychiatric: Affect normal, Judgment normal, Mood normal. Cooperative. ***     LAB:  All pertinent labs reviewed and interpreted.       RADIOLOGY:  Reviewed all pertinent imaging. Please see official radiology report.  No orders to display       EKG:    Performed at: ***    Impression: ***    Rate: ***  Rhythm: ***  Axis: ***  RI Interval: ***  QRS Interval: ***  QTc Interval: ***  ST Changes: ***  Comparison: ***    I have independently reviewed and interpreted the EKG(s) documented above.    PROCEDURES:   ***      [employer] [facility] Documentation:   CMS Diagnoses: {Sepsis/Septic Shock/Stemi/Stroke:413706}             I, Jami Garland, am serving as a scribe to document services personally performed by Melody Dunbar MD based on my observation and the provider's statements  to me. I, Melody Dunbar MD, attest that Jami Garland is acting in a scribe capacity, has observed my performance of the services and has documented them in accordance with my direction.    Melody Dunbar MD  [facility] EMERGENCY DEPARTMENT  [address] [place] [id number] [phone]   in all major joints. No tenderness to palpation or major deformities noted. No midline tenderness of the CTLS spine.    Integument: Warm, Dry, No erythema, No rash. No petechiae.   Neurologic: Alert & oriented to self and place but appears somewhat confused (confirmed by daughter), 5/5 strength in all 4 extremities bilaterally. Sensation intact to light touch in all 4 extremities and the face bilaterally. No focal deficits noted.    Psychiatric: Cooperative.      LAB:  All pertinent labs reviewed and interpreted.     Labs Ordered and Resulted from Time of ED Arrival to Time of ED Departure   INR - Abnormal       Result Value    INR 1.16 (*)    BLOOD GAS VENOUS - Abnormal    pH Venous 7.37      pCO2 Venous 52 (*)     pO2 Venous 22 (*)     Bicarbonate Venous 30 (*)     Base Excess/Deficit Venous 3.3 (*)     FIO2 22      Oxyhemoglobin Venous 34 (*)     O2 Sat, Venous 34.2 (*)    AMMONIA - Abnormal    Ammonia 11 (*)    ROUTINE UA WITH MICROSCOPIC REFLEX TO CULTURE - Abnormal    Color Urine Light Yellow      Appearance Urine Clear      Glucose Urine >1000 (*)     Bilirubin Urine Negative      Ketones Urine 60 (*)     Specific Gravity Urine 1.020      Blood Urine 0.03 mg/dL (*)     pH Urine 5.5      Protein Albumin Urine 30 (*)     Urobilinogen Urine <2.0      Nitrite Urine Positive (*)     Leukocyte Esterase Urine 500 Carlos/uL (*)     Bacteria Urine Moderate (*)     RBC Urine 6 (*)     WBC Urine 155 (*)     Squamous Epithelials Urine <1     CBC WITH PLATELETS AND DIFFERENTIAL - Abnormal    WBC Count 23.1 (*)     RBC Count 5.44      Hemoglobin 15.6      Hematocrit 49.6      MCV 91      MCH 28.7      MCHC 31.5      RDW 14.5      Platelet Count 177     MANUAL DIFFERENTIAL - Abnormal    % Neutrophils 81      % Lymphocytes 12      % Monocytes 6      % Eosinophils 0      % Basophils 0      % Plasma Cells 1      Absolute Neutrophils 18.7 (*)     Absolute Lymphocytes 2.8      Absolute Monocytes 1.4 (*)     Absolute Eosinophils 0.0       Absolute Basophils 0.0      Absolute Plasma Cells 0.2 (*)     RBC Morphology Confirmed RBC Indices      Platelet Assessment        Value: Automated Count Confirmed. Platelet morphology is normal.   GLUCOSE BY METER - Abnormal    GLUCOSE BY METER POCT 112 (*)    COMPREHENSIVE METABOLIC PANEL - Abnormal    Sodium 138      Potassium 5.2      Carbon Dioxide (CO2) 22      Anion Gap 16 (*)     Urea Nitrogen 27.4 (*)     Creatinine 0.84      GFR Estimate 88      Calcium 8.3 (*)     Chloride 100      Glucose 259 (*)     Alkaline Phosphatase 77      AST 18      ALT 13      Protein Total 7.2      Albumin 3.9      Bilirubin Total 0.4     GLUCOSE BY METER - Abnormal    GLUCOSE BY METER POCT 153 (*)    CBC WITH PLATELETS AND DIFFERENTIAL - Abnormal    WBC Count 22.1 (*)     RBC Count 5.19      Hemoglobin 14.9      Hematocrit 48.5      MCV 93      MCH 28.7      MCHC 30.7 (*)     RDW 14.6      Platelet Count 181     MANUAL DIFFERENTIAL - Abnormal    % Neutrophils 86      % Lymphocytes 11      % Monocytes 3      % Eosinophils 0      % Basophils 0      Absolute Neutrophils 19.0 (*)     Absolute Lymphocytes 2.4      Absolute Monocytes 0.7      Absolute Eosinophils 0.0      Absolute Basophils 0.0      RBC Morphology Confirmed RBC Indices      Platelet Assessment        Value: Automated Count Confirmed. Platelet morphology is normal.   GLUCOSE BY METER - Abnormal    GLUCOSE BY METER POCT 238 (*)    GLUCOSE BY METER - Abnormal    GLUCOSE BY METER POCT 270 (*)    GLUCOSE BY METER - Abnormal    GLUCOSE BY METER POCT 241 (*)    GLUCOSE BY METER - Abnormal    GLUCOSE BY METER POCT 192 (*)    BASIC METABOLIC PANEL - Abnormal    Sodium 139      Potassium 4.3      Chloride 103      Carbon Dioxide (CO2) 27      Anion Gap 9      Urea Nitrogen 37.1 (*)     Creatinine 0.82      GFR Estimate 89      Calcium 8.7 (*)     Glucose 138 (*)    CBC WITH PLATELETS - Abnormal    WBC Count 15.9 (*)     RBC Count 4.87      Hemoglobin 14.1      Hematocrit  43.5      MCV 89      MCH 29.0      MCHC 32.4      RDW 14.5      Platelet Count 178     GLUCOSE BY METER - Abnormal    GLUCOSE BY METER POCT 135 (*)    GLUCOSE BY METER - Abnormal    GLUCOSE BY METER POCT 139 (*)    PARTIAL THROMBOPLASTIN TIME - Normal    aPTT 33     COMPREHENSIVE METABOLIC PANEL - Normal    Sodium 138      Potassium 5.1      Carbon Dioxide (CO2) 25      Anion Gap 15      Urea Nitrogen 23.0      Creatinine 0.99      GFR Estimate 77      Calcium 9.2      Chloride 98      Glucose 93      Alkaline Phosphatase 78      AST 18      ALT 12      Protein Total 7.7      Albumin 4.1      Bilirubin Total 0.7     LACTIC ACID WHOLE BLOOD WITH 1X REPEAT IN 2 HR WHEN >2 - Normal    Lactic Acid, Initial 1.4     TROPONIN T, HIGH SENSITIVITY - Normal    Troponin T, High Sensitivity 22     NT PROBNP INPATIENT - Normal    N terminal Pro BNP Inpatient 194     INFLUENZA A/B, RSV AND SARS-COV2 PCR - Normal    Influenza A PCR Negative      Influenza B PCR Negative      RSV PCR Negative      SARS CoV2 PCR Negative     TROPONIN T, HIGH SENSITIVITY - Normal    Troponin T, High Sensitivity 20     CK TOTAL - Normal               RADIOLOGY:  Reviewed all pertinent imaging. Please see official radiology report.    Echocardiogram Complete   Final Result      CT Abdomen Pelvis w Contrast   Final Result   IMPRESSION:    1.  No evidence of acute trauma in the abdomen or pelvis.   2.  Small amount of gas within the bladder lumen. Correlate clinically for recent instrumentation.   3.  Abnormal bladder contour with localized wall thickening, intramural diverticula, and adjacent inflammatory stranding.      CT Chest Pulmonary Embolism w Contrast   Final Result   IMPRESSION:   1.  No evidence of acute trauma in the chest.   2.  No evidence of pulmonary embolus.   3.  Increasing size of 6 mm right upper lobe pulmonary nodule. 3 month follow-up chest CT recommended.   4.  Bibasilar bronchial wall thickening. Correlate clinically for  bronchitis.      CT Cervical Spine w/o Contrast   Final Result   IMPRESSION:   1.  No fracture or posttraumatic subluxation.   2.  No high-grade spinal canal or neural foraminal stenosis.      3.  No significant interval change.      Head CT w/o contrast   Final Result   IMPRESSION:   1.  No CT evidence for acute intracranial process.   2.  Brain atrophy and presumed chronic microvascular ischemic changes as above.      Adult Cardiac Event Monitor    (Results Pending)         EKG:    Performed at: 1937    Impression: Sinus tachycardia with PAC. Rightward axis. No evidence of acute ischemia.     I have independently reviewed and interpreted the EKG(s) documented above.               I, Jami Garland, am serving as a scribe to document services personally performed by Melody Dunbar MD based on my observation and the provider's statements to me. I, Melody Dunbar MD, attest that Jami Garland is acting in a scribe capacity, has observed my performance of the services and has documented them in accordance with my direction.    Melody Dunbar MD  [facility] EMERGENCY DEPARTMENT  [facility] [facility] [id number] [phone]     Melody Dunbar MD  12/16/24 2183

## 2024-12-09 NOTE — PROGRESS NOTES
"[facility] Hospitalist Progress Note  [facility]  Admission date: 12/8/2024    Summary:     79M with DM2, COPD, bladder cancer (s/p bladder surgery 2023) and umbilical hernia who is admitted on 12/8/2024 with Severe Sepsis 2/2 UTI, hypotension, fever, leukocytosis, AMS, fall versus syncope at home.     Assessment/Plan    #Severe Sepsis 2/2 UTI with hypotension and acute metabolic encephalopathy - Patient reports outpatient cystoscopy 3 days ago.    -Confusion and hypotension resolved  -CTX     #New onset Afib RVR - start lopressor, ECHO.  eliquis.    #Volume overload - IV diuresis now that BP adequate.       #Fall versus syncope at home - Likely 2/2 hypotension and sepsis.  PT/OT     #DM2   -home regimen: lantus 55, metformin, jardiance  -inpatient: reduced dose lantus for now , SSI     #Hx of HTN - Hold lisinopril for now.    #RLS? - primidone    #Bladder CA s/p TURBT  #BPH  - flomax    #COPD - no exacerbation.  Anoro, trelegy    Checklist:  Code Status: No CPR- Do NOT Intubate    Diet: Combination Diet Low Consistent Carb (45 g CHO per Meal) Diet; Low Saturated Fat Na <2400mg Diet, No Caffeine Diet     Cardiac Monitoring: ACTIVE order. Indication: Sepsis   DVT px:  DOAC    Disposition and Discharge Planning    Barriers:  HR control, abx plan    Number of days selected below is from a list of system pre-approved options.   Medically Ready for Discharge: Anticipated in 2-4 Days      System Identified Risk Variables  Auto-populated based on system request.  If more complex management decisions required, to be addressed above.  \"  Clinically Significant Risk Factors Present on Admission                # Coagulation Defect: INR = 1.16 (Ref range: 0.85 - 1.15) and/or PTT = 33 Seconds (Ref range: 22 - 38 Seconds), will monitor for bleeding  # Drug Induced Platelet Defect: home medication list includes an antiplatelet medication   # Hypertension: Noted on problem list          # DMII: A1C = 10.5 % (Ref range: " "<5.7 %) within past 6 months    # Overweight: Estimated body mass index is 29.54 kg/m  as calculated from the following:    Height as of this encounter: 1.651 m (5' 5\").    Weight as of this encounter: 80.5 kg (177 lb 8 oz).              \"    Interval Events/Subjective/Notable results:      Reports improvement in breathing immediately with antibiotic  Addendum: d/w daughter.  Reports 3rd respiratory illness in recent past with cough.  Does have bronchitis on CT and azithro added.    Reviewed: BMP, CBC, UA, UC, trop, VBG  Personally interpreted: tele, Afib with RVR  Discussed with: hospital at home. RN      Objective    Vital signs in last 24 hours  Temp:  [98.7  F (37.1  C)-101.6  F (38.7  C)] 98.7  F (37.1  C)  Pulse:  [] 104  Resp:  [18-38] 18  BP: ()/(52-69) 132/69  SpO2:  [90 %-100 %] 100 % O2 Device: None (Room air)    Weight:   177 lbs 8 oz  Body mass index is 29.54 kg/m .    Intake/Output last 3 shifts  I/O last 3 completed shifts:  In: 1365 [IV Piggyback:1365]  Out: 150 [Urine:150]    Physical Exam  General:  Alert, cooperative, no distress    Neurologic:  oriented, facial symmetry preserved, fluent speech.   Psych: calm  HEENT:  Anicteric, MMM  CV: IRIR with rapid rate, normal S1 and S2, +b/l LE edema  Lungs: CTAB.  Easy respirations  Abd: soft, NT  Skin: no rashes noted on exposed skin.    Buchanan Catheter: Not present  Lines: None          Medical Decision Making           Elis Hampton MD, [facility]  Internal Medicine Hospitalist  9:06AM  "

## 2024-12-09 NOTE — PLAN OF CARE
Occupational Therapy Discharge Summary    Reason for therapy discharge:    All goals and outcomes met, no further needs identified.    Progress towards therapy goal(s). See goals on Care Plan in [facility] electronic health record for goal details.  Goals met    Therapy recommendation(s):    No further therapy is recommended.    Stella Bryson, OTR/L 12/9/24

## 2024-12-09 NOTE — PLAN OF CARE
Problem: Gas Exchange Impaired  Goal: Optimal Gas Exchange  Intervention: Optimize Oxygenation and Ventilation  Recent Flowsheet Documentation  Taken 12/9/2024 1438 by Krishan Brian RN  Head of Bed (HOB) Positioning: HOB at 20-30 degrees     Problem: Adult Inpatient Plan of Care  Goal: Optimal Comfort and Wellbeing  Outcome: Not Progressing   Goal Outcome Evaluation:       Pt is alert and oriented x4. Pt denies pain. Pt ambulates well to the restroom with stand by assistance. Expiratory wheezes noted on one of the bases of the lungs. Pt's heart rhythm is normal sinus. Vital signs are stable. No other concerns noted.   Krishan Brian RN  12/9/2024  3:05 PM

## 2024-12-09 NOTE — PHARMACY-ADMISSION MEDICATION HISTORY
Pharmacist Admission Medication History    Admission medication history is complete. The information provided in this note is only as accurate as the sources available at the time of the update.    Information Source(s): Patient, Family member, and CareEverywhere/SureScripts via in-person    Changes made to PTA medication list:  Added:   Lantus Solostar Pen  The patient reports using 55 units of Lantus at bedtime, which has replaced his previous use of the Levemir Pen.  Tamsulosin 0.4 mg capsule  Anoro Ellipta 62.5-25 mcg/act inhaler  Deleted:   Insulin detemir (Levemir) 100 unit/mL pen  The patient reports using 55 units of Lantus at bedtime, which has replaced his previous use of the Levemir Pen.  Changed:   Empagliflozin 25 mg tablet  Changed directions from 'Take 1 mg by mouth daily' to 'Take 25 mg by mouth daily.' This adjustment aligns with the pharmacy fill history    Allergies reviewed with patient and updates made in EHR: yes    Medication History Completed By: Fiorella Patel RPH 12/8/2024 8:38 PM    PTA Med List   Medication Sig Last Dose/Taking    acetaminophen (TYLENOL) 500 MG tablet Take 1-2 tablets (500-1,000 mg) by mouth every 6 hours as needed for mild pain Unknown    albuterol (PROAIR HFA/PROVENTIL HFA/VENTOLIN HFA) 108 (90 Base) MCG/ACT inhaler Inhale 2 puffs into the lungs every 4 hours as needed for shortness of breath, wheezing or cough. 12/7/2024 Evening    albuterol (PROVENTIL) (2.5 MG/3ML) 0.083% neb solution Take 1 vial (2.5 mg) by nebulization every 6 hours as needed for shortness of breath, wheezing or cough. 12/7/2024 Evening    aspirin 81 MG EC tablet Take 81 mg by mouth every other day. 12/6/2024 Morning    cetirizine (ZYRTEC) 10 MG tablet Take 1 tablet (10 mg) by mouth daily. 12/7/2024 Morning    empagliflozin (JARDIANCE) 25 MG TABS tablet Take 25 mg by mouth daily. 12/7/2024 Morning    fluticasone (FLONASE) 50 MCG/ACT nasal spray Spray 1 spray into both nostrils daily as needed for  rhinitis or allergies. 12/7/2024 Morning    Fluticasone-Umeclidin-Vilanterol (TRELEGY ELLIPTA) 200-62.5-25 MCG/ACT oral inhaler Inhale 1 puff into the lungs daily. 12/7/2024 Morning    insulin glargine (LANTUS PEN) 100 UNIT/ML pen Inject 55 Units subcutaneously at bedtime. 12/7/2024 Bedtime    lisinopril (PRINIVIL,ZESTRIL) 10 MG tablet [LISINOPRIL (PRINIVIL,ZESTRIL) 10 MG TABLET] Take 10 mg by mouth daily. 12/7/2024 Morning    magnesium 250 MG tablet Take 250 mg by mouth at bedtime. 12/7/2024 Bedtime    metFORMIN (GLUCOPHAGE) 500 MG tablet [METFORMIN (GLUCOPHAGE) 500 MG TABLET] Take 1,000 mg by mouth 2 (two) times a day with meals. 12/7/2024 Morning    primidone (MYSOLINE) 50 MG tablet [PRIMIDONE (MYSOLINE) 50 MG TABLET] Take 50 mg by mouth at bedtime. 12/7/2024 Bedtime    simvastatin (ZOCOR) 40 MG tablet Take 40 mg by mouth every evening 12/7/2024 Bedtime    tamsulosin (FLOMAX) 0.4 MG capsule Take 0.4 mg by mouth daily. 12/7/2024 Morning    umeclidinium-vilanterol (ANORO ELLIPTA) 62.5-25 MCG/ACT oral inhaler Inhale 1 puff into the lungs daily. 12/7/2024 Morning

## 2024-12-09 NOTE — PROGRESS NOTES
Physical Therapy: Orders received. Chart reviewed and discussed with care team.? Physical Therapy not indicated due to patient declining. Per OT, patient is moving well with stand-by assist. OT reports patient is declining PT evaluation.? Defer discharge recommendations to OT.? Will complete orders.     Zohra Randle, PT  12/9/2024

## 2024-12-09 NOTE — H&P
[facility]    History and Physical - Hospitalist Service       Date of Admission:  12/8/2024    Assessment & Plan      Kelechi Kendrick is a 79 year old male with PMH significant for hypertension, hyperlipidemia, DM2, COPD, bladder cancer (s/p bladder surgery 2023) and umbilical hernia who is admitted on 12/8/2024 with Severe Sepsis 2/2 UTI, hypotension, fever, leukocytosis, AMS, fall versus syncope at home.    # Severe Sepsis 2/2 UTI- Admit patient.  Patient reports outpatient cystoscopy 3 days ago.  Urinalysis consistent with infection.  Hypotension resolved with sepsis fluid bolus.  Patient was initially given empiric Zosyn and vancomycin in ED due to sepsis of unknown source.  Later in ED course, urinalysis resulted and patient changed to IV Rocephin.  Follow-up blood and urine cultures.  Continue IV hydration.  Repeat labs in AM.  Monitor vital signs closely.    #Hypotension- 2/2 sepsis/UTI.  BP improved with fluid resuscitation.  Continue IV antibiotics.  Monitor vital signs closely.     # Fever- 2/2 sepsis/UTI.  Continue IV antibiotics and IV hydration.  Add  PRN Tylenol.  Monitor vital signs closely.      # Leukocytosis- 2/2 sepsis/UTI.  Continue IV antibiotics.  Monitor CBC.    # AMS- 2/2 sepsis/UTI. Per Manoj, patient is now back to baseline mentation.  Continue IV antibiotics and IV hydration.  Monitor vital signs closely.      # Fall versus syncope at home- Likely 2/2 hypotension and sepsis.  Continue IV antibiotics.  Continue IV hydration.  Follow-up with cautions.  Strict bedrest.    # DM2- Place on ISS and monitor accuchecks.    # Hx of HTN- Hold off on home antihypertensives due to initial hypotension on arrival signs closely.        Diet: Combination Diet Low Consistent Carb (45 g CHO per Meal) Diet; Low Saturated Fat Na <2400mg Diet, No Caffeine Diet  DVT Prophylaxis: Pneumatic Compression Devices  Buchanan Catheter: Not present  Lines: None     Cardiac  "Monitoring: ACTIVE order. Indication: Sepsis  Code Status: No CPR- Do NOT Intubate per patient.  Granddaughter says patient has signed DNR documents as well.    Clinically Significant Risk Factors Present on Admission                # Coagulation Defect: INR = 1.16 (Ref range: 0.85 - 1.15) and/or PTT = 33 Seconds (Ref range: 22 - 38 Seconds), will monitor for bleeding  # Drug Induced Platelet Defect: home medication list includes an antiplatelet medication   # Hypertension: Noted on problem list          # DMII: A1C = 10.5 % (Ref range: <5.7 %) within past 6 months    # Overweight: Estimated body mass index is 29.54 kg/m  as calculated from the following:    Height as of this encounter: 1.651 m (5' 5\").    Weight as of this encounter: 80.5 kg (177 lb 8 oz).              Disposition Plan   Anticipate greater than 2 midnight inpatient hospital stay.         Megha Wagner MD  Hospitalist Service  [facility]  Securely message with [employer] (more info)  Text page via [employer] Paging/Directory     ______________________________________________________________________    Chief Complaint   Fall versus syncope at home    History is obtained from the patient, granddaughter at bedside, ED physician and chart review.    History of Present Illness   Kelechi Kendrick is a 79 year old male with PMH significant for hypertension, hyperlipidemia, DM2, COPD, bladder cancer (s/p bladder surgery 2023) and umbilical hernia who presents to ED due to fall versus syncope at home.  Patient was found down by grandson in law.  Patient says earlier in the day, he felt weak, fell onto his couch and slid onto the floor.  He does not know how long he was on the ground.  When found by family, urinary incontinence was noted.  Patient says that he had cystoscopy 3 days ago as outpatient.  Per granddaughter patient was mildly confused but has improved back to baseline in the ED.  He arrived hypotensive, tachycardic, febrile " with AMS.  Patient was given fluid bolus and empiric Zosyn & vancomycin for suspected sepsis.  Later in ED course, urinalysis result was consistent with infection.  Patient denies chills, dysuria, hematuria, urinary frequency, chest pain, palpitations, shortness of breath, nausea, vomiting, diarrhea, abdominal pain.  Patient is calm and currently appears in no acute distress.      Past Medical History    Past Medical History:   Diagnosis Date    Bladder cancer (H)     COPD (chronic obstructive pulmonary disease) (H)     Diabetes mellitus, type II (H)     Dupuytren contracture     HLD (hyperlipidemia)     Hyperlipidemia     Hypertension     Personal history of malignant neoplasm of bladder     Plantar fasciitis     right    Tremor     Umbilical hernia with obstruction     Urinary tract infection        Past Surgical History   Past Surgical History:   Procedure Laterality Date    CYSTOSCOPY, TRANSURETHRAL RESECTION (TUR) TUMOR BLADDER, COMBINED N/A 10/10/2023    Procedure: CYSTOSCOPY, BIPOLAR TRANSURETHRAL RESECTION BLADDER TUMOR;  Surgeon: Derik Hernández MD;  [place] [facility] [place]    CYSTOSCOPY, TRANSURETHRAL RESECTION (TUR) TUMOR BLADDER, COMBINED N/A 3/11/2024    Procedure: CYSTOSCOPY, TRANSURETHRAL RESECTION BLADDER TUMOR;  Surgeon: Derik Hernández MD;  [place] [facility] [place]    HERNIA REPAIR      LEFT TRIGGER THUMB SURGICAL RELEASE Left 08/24/2010    Dr. Reddy    ORTHOPEDIC SURGERY         Prior to Admission Medications   Prior to Admission Medications   Prescriptions Last Dose Informant Patient Reported? Taking?   Fluticasone-Umeclidin-Vilanterol (TRELEGY ELLIPTA) 200-62.5-25 MCG/ACT oral inhaler 12/7/2024 Morning Self, Pharmacy, Other No Yes   Sig: Inhale 1 puff into the lungs daily.   acetaminophen (TYLENOL) 500 MG tablet Unknown Self, Pharmacy, Other No Yes   Sig: Take 1-2 tablets (500-1,000 mg) by mouth every 6 hours as needed for mild pain   albuterol (PROAIR HFA/PROVENTIL  HFA/VENTOLIN HFA) 108 (90 Base) MCG/ACT inhaler 12/7/2024 Evening Self, Pharmacy, Other No Yes   Sig: Inhale 2 puffs into the lungs every 4 hours as needed for shortness of breath, wheezing or cough.   albuterol (PROVENTIL) (2.5 MG/3ML) 0.083% neb solution 12/7/2024 Evening Self, Pharmacy, Other No Yes   Sig: Take 1 vial (2.5 mg) by nebulization every 6 hours as needed for shortness of breath, wheezing or cough.   aspirin 81 MG EC tablet 12/6/2024 Morning Self, Pharmacy, Other Yes Yes   Sig: Take 81 mg by mouth every other day.   cetirizine (ZYRTEC) 10 MG tablet 12/7/2024 Morning Self, Pharmacy, Other No Yes   Sig: Take 1 tablet (10 mg) by mouth daily.   empagliflozin (JARDIANCE) 25 MG TABS tablet 12/7/2024 Morning Self, Pharmacy, Other Yes Yes   Sig: Take 25 mg by mouth daily.   fluticasone (FLONASE) 50 MCG/ACT nasal spray 12/7/2024 Morning Self, Pharmacy, Other Yes Yes   Sig: Spray 1 spray into both nostrils daily as needed for rhinitis or allergies.   insulin glargine (LANTUS PEN) 100 UNIT/ML pen 12/7/2024 Bedtime Self, Pharmacy, Other Yes Yes   Sig: Inject 55 Units subcutaneously at bedtime.   lisinopril (PRINIVIL,ZESTRIL) 10 MG tablet 12/7/2024 Morning Self, Pharmacy, Other Yes Yes   Sig: [LISINOPRIL (PRINIVIL,ZESTRIL) 10 MG TABLET] Take 10 mg by mouth daily.   magnesium 250 MG tablet 12/7/2024 Bedtime Self, Pharmacy, Other Yes Yes   Sig: Take 250 mg by mouth at bedtime.   metFORMIN (GLUCOPHAGE) 500 MG tablet 12/7/2024 Morning Self, Pharmacy, Other Yes Yes   Sig: [METFORMIN (GLUCOPHAGE) 500 MG TABLET] Take 1,000 mg by mouth 2 (two) times a day with meals.   primidone (MYSOLINE) 50 MG tablet 12/7/2024 Bedtime Self, Pharmacy, Other Yes Yes   Sig: [PRIMIDONE (MYSOLINE) 50 MG TABLET] Take 50 mg by mouth at bedtime.   simvastatin (ZOCOR) 40 MG tablet 12/7/2024 Bedtime Self, Pharmacy, Other Yes Yes   Sig: Take 40 mg by mouth every evening   tamsulosin (FLOMAX) 0.4 MG capsule 12/7/2024 Morning Self, Pharmacy, Other  Yes Yes   Sig: Take 0.4 mg by mouth daily.   umeclidinium-vilanterol (ANORO ELLIPTA) 62.5-25 MCG/ACT oral inhaler 12/7/2024 Morning Self, Pharmacy, Other Yes Yes   Sig: Inhale 1 puff into the lungs daily.      Facility-Administered Medications: None        Review of Systems    The 10 point Review of Systems is negative other than noted in the HPI.    Social History   I have reviewed this patient's social history and updated it with pertinent information if needed.  Social History     Tobacco Use    Smoking status: Former     Current packs/day: 0.40     Average packs/day: 0.4 packs/day for 30.0 years (12.0 ttl pk-yrs)     Types: Cigarettes    Smokeless tobacco: Never   Vaping Use    Vaping status: Never Used   Substance Use Topics    Alcohol use: No    Drug use: No         Family History   Mother-diabetes mellitus    Physical Exam   Vital Signs: Temp: 98.7  F (37.1  C) Temp src: Oral BP: 104/59 Pulse: 96   Resp: 19 SpO2: 90 % O2 Device: None (Room air)    Weight: 177 lbs 8 oz    General Appearance: AAOx3, NAD  Respiratory: Clear, diminished breath sounds in bases bilaterally  Cardiovascular: Tachycardic, S1-S2  GI: +BS, soft, nontender, nondistended  : No suprapubic tenderness  Skin: No cyanosis, no jaundice, no rash  Other: No pedal edema    Medical Decision Making       60 MINUTES SPENT BY ME on the date of service doing chart review, history, exam, documentation & further activities per the note.      Data     I have personally reviewed the following data over the past 24 hrs:    23.1 (H)  \   15.6   / 177     138 98 23.0 /  112 (H)   5.1 25 0.99 \     ALT: 12 AST: 18 AP: 78 TBILI: 0.7   ALB: 4.1 TOT PROTEIN: 7.7 LIPASE: N/A     Trop: 20 BNP: 194     Procal: N/A CRP: N/A Lactic Acid: 1.4       INR:  1.16 (H) PTT:  33   D-dimer:  N/A Fibrinogen:  N/A       Imaging results reviewed over the past 24 hrs:   Recent Results (from the past 24 hours)   Head CT w/o contrast    Narrative    EXAM: CT HEAD W/O  CONTRAST  LOCATION: [facility]  DATE: 12/8/2024    INDICATION: fall, AMS. Injury. Pain.  COMPARISON: None.  TECHNIQUE: Routine CT Head without IV contrast. Multiplanar reformats. Dose reduction techniques were used.    FINDINGS:  INTRACRANIAL CONTENTS: No intracranial hemorrhage, extraaxial collection, or mass effect.  No CT evidence of acute infarct. Mild presumed chronic small vessel ischemic changes. Moderate generalized volume loss. No hydrocephalus. Skull base vascular   calcifications.    VISUALIZED ORBITS/SINUSES/MASTOIDS: No intraorbital abnormality. Mucosal thickening primarily involving the ethmoid air cells. No middle ear or mastoid effusion.    BONES/SOFT TISSUES: No acute abnormality.      Impression    IMPRESSION:  1.  No CT evidence for acute intracranial process.  2.  Brain atrophy and presumed chronic microvascular ischemic changes as above.   CT Cervical Spine w/o Contrast    Narrative    EXAM: CT CERVICAL SPINE W/O CONTRAST  LOCATION: [facility]  DATE: 12/8/2024    INDICATION: fall. Injury. Pain.  COMPARISON: Cervical spine CT 25 and July 2018  TECHNIQUE: Routine CT Cervical Spine without IV contrast. Multiplanar reformats. Dose reduction techniques were used.    FINDINGS:  VERTEBRA: Normal vertebral body heights and alignment. No fracture or posttraumatic subluxation.     CANAL/FORAMINA:     C3-4: Facet and uncinate degenerative changes with moderate right foraminal stenosis.    C4-5: Facet and uncinate degenerative changes with moderate to severe right foraminal stenosis.    C5-6: Disc osteophyte with mild central canal stenosis. Uncinate spur with moderate to severe right and mild left foraminal stenosis.    T1-2: Facet DJD. Mild right foraminal stenosis.    PARASPINAL: No extraspinal abnormality.      Impression    IMPRESSION:  1.  No fracture or posttraumatic subluxation.  2.  No high-grade spinal canal or neural foraminal stenosis.    3.  No  significant interval change.   CT Chest Pulmonary Embolism w Contrast    Narrative    EXAM: CT CHEST PULMONARY EMBOLISM W CONTRAST  LOCATION: [facility]  DATE: 12/8/2024    INDICATION: fall, shortness of breath, tachycardia, previous hypoxia, eval for PE, pneumonia, etc  COMPARISON: 8/31/2023  TECHNIQUE: CT chest pulmonary angiogram during arterial phase injection of IV contrast. Multiplanar reformats and MIP reconstructions were performed. Dose reduction techniques were used.   CONTRAST: 90 cc Isovue-370    FINDINGS:  ANGIOGRAM CHEST: Pulmonary arteries are normal caliber and negative for pulmonary emboli. Moderately extensive atherosclerotic calcification. No CT evidence of right heart strain.    LUNGS AND PLEURA: 2 mm right upper lobe pulmonary nodule, image 17 series 7, unchanged, benign. Increase in size of medial right upper lobe pulmonary nodule, 6 mm, image 76, series 8. No pneumothorax or pleural effusion. Lower lobe bronchial wall   thickening.    MEDIASTINUM/AXILLAE: No mediastinal hematoma.    CORONARY ARTERY CALCIFICATION: Severe.    UPPER ABDOMEN: No acute abnormalities    MUSCULOSKELETAL: Old left rib fractures.      Impression    IMPRESSION:  1.  No evidence of acute trauma in the chest.  2.  No evidence of pulmonary embolus.  3.  Increasing size of 6 mm right upper lobe pulmonary nodule. 3 month follow-up chest CT recommended.  4.  Bibasilar bronchial wall thickening. Correlate clinically for bronchitis.   CT Abdomen Pelvis w Contrast    Narrative    EXAM: CT ABDOMEN PELVIS W CONTRAST  LOCATION: [facility]  DATE: 12/8/2024    INDICATION*: fall, AMS; lethargy  COMPARISON: 8/11/2023  TECHNIQUE: CT scan of the abdomen and pelvis was performed following injection of IV contrast. Multiplanar reformats were obtained. Dose reduction techniques were used.  CONTRAST: isovue 370 90ml    FINDINGS:   LOWER CHEST: Severe coronary artery  calcification.    HEPATOBILIARY: No biliary dilatation    PANCREAS: Unremarkable    SPLEEN: Unremarkable    ADRENAL GLANDS: Unremarkable    KIDNEYS/BLADDER: No hydronephrosis. Abnormal bladder contour with lobulated appearance, asymmetric wall thickening and intramural diverticula. There is some inflammatory stranding adjacent to the superior/left border of the bladder. Small dot of gas   within the bladder lumen. Discrete mass identified previously is no longer clearly apparent.    BOWEL: No bowel obstruction. Normal caliber appendix.    LYMPH NODES: No significant retroperitoneal adenopathy.    VASCULATURE: No abdominal aortic aneurysm. Moderately extensive atherosclerotic calcification.    PELVIC ORGANS: Significant prostatic hypertrophy.    MUSCULOSKELETAL: No acute bony abnormalities.      Impression    IMPRESSION:   1.  No evidence of acute trauma in the abdomen or pelvis.  2.  Small amount of gas within the bladder lumen. Correlate clinically for recent instrumentation.  3.  Abnormal bladder contour with localized wall thickening, intramural diverticula, and adjacent inflammatory stranding.

## 2024-12-09 NOTE — ED TRIAGE NOTES
Patient fell onto floor at some point today. Patient does not know when it happened. It appears patient had incontinence episode. Upon EMS arrival O2 sats were in low 80s. Neb treatment and supllemental O2 given by EMS en route. Patient appears lethargic, confused at times with questions, in triage O2 sats were low 80s on 4L via nasal cannula with heart rate in 130s. Brought patient back to room immediately to get patient on monitors. Patient is not on thinners.      Triage Assessment (Adult)       Row Name 12/08/24 8330          Triage Assessment    Airway WDL WDL        Respiratory WDL    Respiratory WDL X;rhythm/pattern     Rhythm/Pattern, Respiratory shortness of breath        Skin Circulation/Temperature WDL    Skin Circulation/Temperature WDL WDL        Cardiac WDL    Cardiac WDL X;rhythm     Pulse Rate & Regularity tachycardic        Peripheral/Neurovascular WDL    Peripheral Neurovascular WDL WDL        Cognitive/Neuro/Behavioral WDL    Cognitive/Neuro/Behavioral WDL WDL

## 2024-12-09 NOTE — ED NOTES
Pt a/o x4, denies pain, VSS, pt does not know how long he was down when asked, no resp distress noted, on room air, call light within reach: informed pt to call for assistance.

## 2024-12-09 NOTE — PHARMACY-VANCOMYCIN DOSING SERVICE
Pharmacy Vancomycin Initial Note  Date of Service 2024  Patient's  [id number]  79 year old, male    Indication: Sepsis    Current estimated CrCl = Estimated Creatinine Clearance: 72 mL/min (based on SCr of 0.8 mg/dL).    Creatinine for last 3 days  No results found for requested labs within last 3 days.    Recent Vancomycin Level(s) for last 3 days  No results found for requested labs within last 3 days.      Vancomycin IV Administrations (past 72 hours)        No vancomycin orders with administrations in past 72 hours.                    Nephrotoxins and other renal medications (From now, onward)      Start     Dose/Rate Route Frequency Ordered Stop    24  vancomycin (VANCOCIN) 1,500 mg in 0.9% NaCl 265 mL intermittent infusion         1,500 mg  over 90 Minutes Intravenous ONCE 24  piperacillin-tazobactam (ZOSYN) 3.375 g vial to attach to  mL bag         3.375 g  over 30 Minutes Intravenous ONCE 24              Contrast Orders - past 72 hours (72h ago, onward)      None                    Plan:  Start vancomycin  1500 mg IV once  If continuing vancomycin inpatient, please re-order pharmacy to dose vancomycin consult    Phillip Rainey, [facility]

## 2024-12-10 LAB
ANION GAP SERPL CALCULATED.3IONS-SCNC: 9 MMOL/L (ref 7–15)
BUN SERPL-MCNC: 37.1 MG/DL (ref 8–23)
CALCIUM SERPL-MCNC: 8.7 MG/DL (ref 8.8–10.4)
CHLORIDE SERPL-SCNC: 103 MMOL/L (ref 98–107)
CREAT SERPL-MCNC: 0.82 MG/DL (ref 0.67–1.17)
EGFRCR SERPLBLD CKD-EPI 2021: 89 ML/MIN/1.73M2
ERYTHROCYTE [DISTWIDTH] IN BLOOD BY AUTOMATED COUNT: 14.5 % (ref 10–15)
GLUCOSE BLDC GLUCOMTR-MCNC: 135 MG/DL (ref 70–99)
GLUCOSE BLDC GLUCOMTR-MCNC: 139 MG/DL (ref 70–99)
GLUCOSE BLDC GLUCOMTR-MCNC: 192 MG/DL (ref 70–99)
GLUCOSE BLDC GLUCOMTR-MCNC: 196 MG/DL (ref 70–99)
GLUCOSE BLDC GLUCOMTR-MCNC: 228 MG/DL (ref 70–99)
GLUCOSE SERPL-MCNC: 138 MG/DL (ref 70–99)
HCO3 SERPL-SCNC: 27 MMOL/L (ref 22–29)
HCT VFR BLD AUTO: 43.5 % (ref 40–53)
HGB BLD-MCNC: 14.1 G/DL (ref 13.3–17.7)
MCH RBC QN AUTO: 29 PG (ref 26.5–33)
MCHC RBC AUTO-ENTMCNC: 32.4 G/DL (ref 31.5–36.5)
MCV RBC AUTO: 89 FL (ref 78–100)
PLATELET # BLD AUTO: 178 10E3/UL (ref 150–450)
POTASSIUM SERPL-SCNC: 4.3 MMOL/L (ref 3.4–5.3)
RBC # BLD AUTO: 4.87 10E6/UL (ref 4.4–5.9)
SODIUM SERPL-SCNC: 139 MMOL/L (ref 135–145)
WBC # BLD AUTO: 15.9 10E3/UL (ref 4–11)

## 2024-12-10 PROCEDURE — 80048 BASIC METABOLIC PNL TOTAL CA: CPT | Performed by: HOSPITALIST

## 2024-12-10 PROCEDURE — 250N000013 HC RX MED GY IP 250 OP 250 PS 637: Performed by: HOSPITALIST

## 2024-12-10 PROCEDURE — 82962 GLUCOSE BLOOD TEST: CPT

## 2024-12-10 PROCEDURE — 250N000013 HC RX MED GY IP 250 OP 250 PS 637: Performed by: INTERNAL MEDICINE

## 2024-12-10 PROCEDURE — 85018 HEMOGLOBIN: CPT | Performed by: HOSPITALIST

## 2024-12-10 PROCEDURE — 250N000011 HC RX IP 250 OP 636: Performed by: INTERNAL MEDICINE

## 2024-12-10 PROCEDURE — 36415 COLL VENOUS BLD VENIPUNCTURE: CPT | Performed by: HOSPITALIST

## 2024-12-10 PROCEDURE — 250N000011 HC RX IP 250 OP 636: Performed by: FAMILY MEDICINE

## 2024-12-10 PROCEDURE — 85041 AUTOMATED RBC COUNT: CPT | Performed by: HOSPITALIST

## 2024-12-10 PROCEDURE — 99232 SBSQ HOSP IP/OBS MODERATE 35: CPT | Performed by: HOSPITALIST

## 2024-12-10 PROCEDURE — 120N000001 HC R&B MED SURG/OB

## 2024-12-10 RX ORDER — FUROSEMIDE 20 MG/1
20 TABLET ORAL DAILY
Status: DISCONTINUED | OUTPATIENT
Start: 2024-12-11 | End: 2024-12-11 | Stop reason: HOSPADM

## 2024-12-10 RX ADMIN — FLUTICASONE FUROATE AND VILANTEROL TRIFENATATE 1 PUFF: 200; 25 POWDER RESPIRATORY (INHALATION) at 08:33

## 2024-12-10 RX ADMIN — CEFTRIAXONE SODIUM 2 G: 2 INJECTION, POWDER, FOR SOLUTION INTRAMUSCULAR; INTRAVENOUS at 04:07

## 2024-12-10 RX ADMIN — UMECLIDINIUM 1 PUFF: 62.5 AEROSOL, POWDER ORAL at 08:33

## 2024-12-10 RX ADMIN — SIMVASTATIN 40 MG: 40 TABLET, FILM COATED ORAL at 20:08

## 2024-12-10 RX ADMIN — APIXABAN 5 MG: 5 TABLET, FILM COATED ORAL at 08:31

## 2024-12-10 RX ADMIN — METOPROLOL TARTRATE 25 MG: 25 TABLET, FILM COATED ORAL at 08:32

## 2024-12-10 RX ADMIN — APIXABAN 5 MG: 5 TABLET, FILM COATED ORAL at 20:08

## 2024-12-10 RX ADMIN — METOPROLOL TARTRATE 25 MG: 25 TABLET, FILM COATED ORAL at 20:08

## 2024-12-10 RX ADMIN — MAGNESIUM OXIDE TAB 400 MG (241.3 MG ELEMENTAL MG) 200 MG: 400 (241.3 MG) TAB at 21:24

## 2024-12-10 RX ADMIN — AZITHROMYCIN DIHYDRATE 250 MG: 250 TABLET, FILM COATED ORAL at 08:44

## 2024-12-10 RX ADMIN — TAMSULOSIN HYDROCHLORIDE 0.4 MG: 0.4 CAPSULE ORAL at 08:31

## 2024-12-10 RX ADMIN — INSULIN ASPART 3 UNITS: 100 INJECTION, SOLUTION INTRAVENOUS; SUBCUTANEOUS at 17:11

## 2024-12-10 RX ADMIN — FUROSEMIDE 20 MG: 10 INJECTION, SOLUTION INTRAMUSCULAR; INTRAVENOUS at 08:31

## 2024-12-10 RX ADMIN — INSULIN GLARGINE 40 UNITS: 100 INJECTION, SOLUTION SUBCUTANEOUS at 21:24

## 2024-12-10 RX ADMIN — PRIMIDONE 50 MG: 50 TABLET ORAL at 21:24

## 2024-12-10 RX ADMIN — CETIRIZINE HYDROCHLORIDE 10 MG: 10 TABLET, FILM COATED ORAL at 08:31

## 2024-12-10 ASSESSMENT — ACTIVITIES OF DAILY LIVING (ADL)
ADLS_ACUITY_SCORE: 63
ADLS_ACUITY_SCORE: 40
ADLS_ACUITY_SCORE: 63
ADLS_ACUITY_SCORE: 40
ADLS_ACUITY_SCORE: 63

## 2024-12-10 NOTE — PROGRESS NOTES
[facility] Hospitalist Progress Note  [facility]  Admission date: 12/8/2024    Summary:     79M with DM2, COPD, bladder cancer (s/p bladder surgery 2023) and umbilical hernia who is admitted on 12/8/2024 with Severe Sepsis 2/2 UTI, hypotension, fever, leukocytosis, AMS, fall versus syncope at home.     Assessment/Plan    #Severe Sepsis 2/2 UTI with hypotension and acute metabolic encephalopathy - surveillance cysto 3 days PTA    -Confusion and hypotension resolved  -CTX pending culture results.     #New onset Afib RVR -  converted to NSR.    -start lopressor, Eliquis   -note interaction with primidone which would reduce anticoagulation effect of eliquis. With only a single brief episode Afib in setting of acute illness and only on a low dose of primidone.  I think Eliquis ok and will plan to discharge with event monitor and Afib clinic follow-up.  If Afib burden larger would transition to Coumadin.    #Acute HFpEF - IV diuresis now that BP adequate.  Breathing best its been in awhile and wonder if prior volume overload for awhile.  Transition to oral soon.     #Fall versus syncope at home - Likely 2/2 hypotension and sepsis.  PT/OT     #DM2   -home regimen: lantus 55, metformin, jardiance  -inpatient: reduced dose lantus for now, 1:15CE, SSI     #Hx of HTN - Hold lisinopril for now    #RLS? - primidone    #Bladder CA s/p TURBT  #BPH  - flomax    #COPD - no exacerbation.  Anoro, trelegy    Checklist:  Code Status: No CPR- Do NOT Intubate    Diet: Combination Diet Moderate Consistent Carb (60 g CHO per Meal) Diet; Low Saturated Fat Na <2400mg Diet, No Caffeine Diet     Cardiac Monitoring: ACTIVE order. Indication: Sepsis   DVT px:  DOAC    Disposition and Discharge Planning    Barriers:  HR control, abx plan    Number of days selected below is from a list of system pre-approved options.   Medically Ready for Discharge: Anticipated Tomorrow      System Identified Risk Variables  Auto-populated based on  "system request.  If more complex management decisions required, to be addressed above.  \"  Clinically Significant Risk Factors Present on Admission           # Hypocalcemia: Lowest Ca = 8.3 mg/dL in last 2 days, will monitor and replace as appropriate      # Coagulation Defect: INR = 1.16 (Ref range: 0.85 - 1.15) and/or PTT = 33 Seconds (Ref range: 22 - 38 Seconds), will monitor for bleeding  # Drug Induced Platelet Defect: home medication list includes an antiplatelet medication   # Hypertension: Noted on problem list          # DMII: A1C = 10.5 % (Ref range: <5.7 %) within past 6 months    # Overweight: Estimated body mass index is 29.54 kg/m  as calculated from the following:    Height as of this encounter: 1.651 m (5' 5\").    Weight as of this encounter: 80.5 kg (177 lb 8 oz).              \"    Interval Events/Subjective/Notable results:    Reports breathing is best he's felt in awhile    Reviewed: CBC, ECHO, UC, BMP pending  Personally interpreted: tele with NSR      Objective    Vital signs in last 24 hours  Temp:  [97.4  F (36.3  C)-101.6  F (38.7  C)] 97.4  F (36.3  C)  Pulse:  [] 92  Resp:  [17-38] 29  BP: ()/(52-80) 116/59  SpO2:  [90 %-100 %] 96 % O2 Device: None (Room air)    Weight:   177 lbs 8 oz  Body mass index is 29.54 kg/m .    Intake/Output last 3 shifts  I/O last 3 completed shifts:  In: 1365 [IV Piggyback:1365]  Out: 150 [Urine:150]    Physical Exam  General:  Alert, cooperative, no distress    Neurologic:  oriented, facial symmetry preserved, fluent speech.   Psych: calm  HEENT:  Anicteric, MMM  CV: , +b/l LE edema improved  Lungs:  Easy respirations  Skin: no rashes noted on exposed skin.    Buchanan Catheter: Not present  Lines: None          Medical Decision Making           Elis Hampton MD, [facility]  Internal Medicine Hospitalist    "

## 2024-12-10 NOTE — PLAN OF CARE
Problem: Adult Inpatient Plan of Care  Goal: Absence of Hospital-Acquired Illness or Injury  Outcome: Progressing  Intervention: Identify and Manage Fall Risk  Recent Flowsheet Documentation  Taken 12/10/2024 0000 by Anson Lopez RN  Safety Promotion/Fall Prevention:   assistive device/personal items within reach   clutter free environment maintained   nonskid shoes/slippers when out of bed   room near nurse's station   safety round/check completed  Intervention: Prevent Skin Injury  Recent Flowsheet Documentation  Taken 12/10/2024 0000 by Anson Lopez RN  Body Position: position changed independently     Problem: Gas Exchange Impaired  Goal: Optimal Gas Exchange  Outcome: Progressing  Intervention: Optimize Oxygenation and Ventilation  Recent Flowsheet Documentation  Taken 12/10/2024 0000 by Anson Lopez, RN  Head of Bed (HOB) Positioning: HOB at 30 degrees     Problem: Comorbidity Management  Goal: Maintenance of COPD Symptom Control  Outcome: Progressing   Goal Outcome Evaluation:  Hypotensive, MD aware, medications held on RA. A&Ox4, denies pain. Bilateral lungs diminished throughout. Tele NSR. Carb count, SBA at bedside.  and 192. Will continue with plan of care.

## 2024-12-10 NOTE — PLAN OF CARE
"  Problem: Adult Inpatient Plan of Care  Goal: Plan of Care Review  Description: The Plan of Care Review/Shift note should be completed every shift.  The Outcome Evaluation is a brief statement about your assessment that the patient is improving, declining, or no change.  This information will be displayed automatically on your shift  note.  Outcome: Progressing  Goal: Patient-Specific Goal (Individualized)  Description: You can add care plan individualizations to a care plan. Examples of Individualization might be:  \"Parent requests to be called daily at 9am for status\", \"I have a hard time hearing out of my right ear\", or \"Do not touch me to wake me up as it startles  me\".  Outcome: Progressing  Goal: Absence of Hospital-Acquired Illness or Injury  Outcome: Progressing  Intervention: Prevent Skin Injury  Recent Flowsheet Documentation  Taken 12/9/2024 2200 by Laquita Alexander RN  Body Position: supine  Goal: Optimal Comfort and Wellbeing  Outcome: Progressing  Goal: Readiness for Transition of Care  Outcome: Progressing     Problem: Gas Exchange Impaired  Goal: Optimal Gas Exchange  Outcome: Progressing  Intervention: Optimize Oxygenation and Ventilation  Recent Flowsheet Documentation  Taken 12/9/2024 2200 by Laquita Alexander RN  Head of Bed (HOB) Positioning: HOB at 30 degrees   Goal Outcome Evaluation:       Patient denies pain. BP 91/52. Patient due for Metoprolol and IV lasix. Denies dizziness. Text dr and have now have BP parameters for medication. Both Lasix and Metoprolol held due BP                 "

## 2024-12-10 NOTE — PROGRESS NOTES
Patient admitted to room 406. Alert, oriented, up with SBA. Eating and drinking well, denies pain. Sats maintained on room air. Voiding good amounts. Will monitor.

## 2024-12-10 NOTE — PLAN OF CARE
Problem: Gas Exchange Impaired  Goal: Optimal Gas Exchange  Outcome: Progressing  Intervention: Optimize Oxygenation and Ventilation  Recent Flowsheet Documentation  Taken 12/10/2024 0900 by Zollinger, Nancy K, RN  Head of Bed (HOB) Positioning: HOB at 20-30 degrees   Goal Outcome Evaluation:  Denies pain.  NSR on tele.  Maintaining sats on room air.  Up with SBA.

## 2024-12-10 NOTE — PLAN OF CARE
Goal Outcome Evaluation:    Writer took care of pt from 1500 to 2000.   Problem: Adult Inpatient Plan of Care  Goal: Plan of Care Review  Description: The Plan of Care Review/Shift note should be completed every shift.  The Outcome Evaluation is a brief statement about your assessment that the patient is improving, declining, or no change.  This information will be displayed automatically on your shift  note.  Outcome: Progressing  Flowsheets (Taken 12/9/2024 4787)  Plan of Care Reviewed With: patient  Overall Patient Progress: improving     Problem: Adult Inpatient Plan of Care  Goal: Optimal Comfort and Wellbeing  Outcome: Progressing     Problem: Adult Inpatient Plan of Care  Goal: Readiness for Transition of Care  Outcome: Progressing     Problem: Gas Exchange Impaired  Goal: Optimal Gas Exchange  Outcome: Progressing       Pt denies pain, up to the bathroom voiding urine independent.  Denies SOB on exertion. RA VSS  BG ac 270.     Tele- NSR  Ate 100% of dinner          Plan of Care Reviewed With: patient    Overall Patient Progress: improvingOverall Patient Progress: improving

## 2024-12-11 VITALS
SYSTOLIC BLOOD PRESSURE: 127 MMHG | BODY MASS INDEX: 29.57 KG/M2 | WEIGHT: 177.5 LBS | TEMPERATURE: 96 F | HEIGHT: 65 IN | DIASTOLIC BLOOD PRESSURE: 63 MMHG | RESPIRATION RATE: 22 BRPM | OXYGEN SATURATION: 93 % | HEART RATE: 68 BPM

## 2024-12-11 LAB
ANION GAP SERPL CALCULATED.3IONS-SCNC: 10 MMOL/L (ref 7–15)
BACTERIA UR CULT: ABNORMAL
BACTERIA UR CULT: ABNORMAL
BUN SERPL-MCNC: 26.2 MG/DL (ref 8–23)
CALCIUM SERPL-MCNC: 8.6 MG/DL (ref 8.8–10.4)
CHLORIDE SERPL-SCNC: 100 MMOL/L (ref 98–107)
CREAT SERPL-MCNC: 0.74 MG/DL (ref 0.67–1.17)
EGFRCR SERPLBLD CKD-EPI 2021: >90 ML/MIN/1.73M2
ERYTHROCYTE [DISTWIDTH] IN BLOOD BY AUTOMATED COUNT: 14 % (ref 10–15)
GLUCOSE BLDC GLUCOMTR-MCNC: 79 MG/DL (ref 70–99)
GLUCOSE BLDC GLUCOMTR-MCNC: 88 MG/DL (ref 70–99)
GLUCOSE SERPL-MCNC: 130 MG/DL (ref 70–99)
HCO3 SERPL-SCNC: 26 MMOL/L (ref 22–29)
HCT VFR BLD AUTO: 47.2 % (ref 40–53)
HGB BLD-MCNC: 15.1 G/DL (ref 13.3–17.7)
MCH RBC QN AUTO: 28.8 PG (ref 26.5–33)
MCHC RBC AUTO-ENTMCNC: 32 G/DL (ref 31.5–36.5)
MCV RBC AUTO: 90 FL (ref 78–100)
PLATELET # BLD AUTO: 170 10E3/UL (ref 150–450)
POTASSIUM SERPL-SCNC: 4.6 MMOL/L (ref 3.4–5.3)
RBC # BLD AUTO: 5.25 10E6/UL (ref 4.4–5.9)
SODIUM SERPL-SCNC: 136 MMOL/L (ref 135–145)
WBC # BLD AUTO: 9.4 10E3/UL (ref 4–11)

## 2024-12-11 PROCEDURE — 250N000013 HC RX MED GY IP 250 OP 250 PS 637: Performed by: INTERNAL MEDICINE

## 2024-12-11 PROCEDURE — 85041 AUTOMATED RBC COUNT: CPT | Performed by: HOSPITALIST

## 2024-12-11 PROCEDURE — 99239 HOSP IP/OBS DSCHRG MGMT >30: CPT | Performed by: HOSPITALIST

## 2024-12-11 PROCEDURE — 250N000011 HC RX IP 250 OP 636: Performed by: FAMILY MEDICINE

## 2024-12-11 PROCEDURE — 85014 HEMATOCRIT: CPT | Performed by: HOSPITALIST

## 2024-12-11 PROCEDURE — 36415 COLL VENOUS BLD VENIPUNCTURE: CPT | Performed by: HOSPITALIST

## 2024-12-11 PROCEDURE — 250N000013 HC RX MED GY IP 250 OP 250 PS 637: Performed by: HOSPITALIST

## 2024-12-11 PROCEDURE — 80048 BASIC METABOLIC PNL TOTAL CA: CPT | Performed by: HOSPITALIST

## 2024-12-11 RX ORDER — METOPROLOL SUCCINATE 50 MG/1
25 TABLET, EXTENDED RELEASE ORAL DAILY
Qty: 30 TABLET | Refills: 3 | Status: SHIPPED | OUTPATIENT
Start: 2024-12-11

## 2024-12-11 RX ORDER — CEFDINIR 300 MG/1
300 CAPSULE ORAL 2 TIMES DAILY
Qty: 8 CAPSULE | Refills: 0 | Status: SHIPPED | OUTPATIENT
Start: 2024-12-12 | End: 2024-12-16

## 2024-12-11 RX ORDER — FUROSEMIDE 20 MG/1
20 TABLET ORAL DAILY
Qty: 30 TABLET | Refills: 3 | Status: SHIPPED | OUTPATIENT
Start: 2024-12-12

## 2024-12-11 RX ORDER — AZITHROMYCIN 250 MG/1
250 TABLET, FILM COATED ORAL DAILY
Qty: 2 TABLET | Refills: 0 | Status: SHIPPED | OUTPATIENT
Start: 2024-12-12 | End: 2024-12-14

## 2024-12-11 RX ADMIN — UMECLIDINIUM 1 PUFF: 62.5 AEROSOL, POWDER ORAL at 09:05

## 2024-12-11 RX ADMIN — CEFTRIAXONE SODIUM 2 G: 2 INJECTION, POWDER, FOR SOLUTION INTRAMUSCULAR; INTRAVENOUS at 04:57

## 2024-12-11 RX ADMIN — CETIRIZINE HYDROCHLORIDE 10 MG: 10 TABLET, FILM COATED ORAL at 09:05

## 2024-12-11 RX ADMIN — FLUTICASONE FUROATE AND VILANTEROL TRIFENATATE 1 PUFF: 200; 25 POWDER RESPIRATORY (INHALATION) at 09:05

## 2024-12-11 RX ADMIN — APIXABAN 5 MG: 5 TABLET, FILM COATED ORAL at 09:05

## 2024-12-11 RX ADMIN — AZITHROMYCIN DIHYDRATE 250 MG: 250 TABLET, FILM COATED ORAL at 09:05

## 2024-12-11 RX ADMIN — METOPROLOL TARTRATE 25 MG: 25 TABLET, FILM COATED ORAL at 09:05

## 2024-12-11 RX ADMIN — TAMSULOSIN HYDROCHLORIDE 0.4 MG: 0.4 CAPSULE ORAL at 09:05

## 2024-12-11 RX ADMIN — FUROSEMIDE 20 MG: 20 TABLET ORAL at 09:05

## 2024-12-11 ASSESSMENT — ACTIVITIES OF DAILY LIVING (ADL)
ADLS_ACUITY_SCORE: 40

## 2024-12-11 NOTE — PLAN OF CARE
Problem: Adult Inpatient Plan of Care  Goal: Plan of Care Review  Description: The Plan of Care Review/Shift note should be completed every shift.  The Outcome Evaluation is a brief statement about your assessment that the patient is improving, declining, or no change.  This information will be displayed automatically on your shift  note.  Outcome: Progressing  Goal: Absence of Hospital-Acquired Illness or Injury  Intervention: Identify and Manage Fall Risk  Recent Flowsheet Documentation  Taken 12/11/2024 0030 by Yvonne Martinez RN  Safety Promotion/Fall Prevention:   safety round/check completed   lighting adjusted   chemotherapeutic precautions   clutter free environment maintained   increased rounding and observation  Taken 12/10/2024 2000 by Yvonne Martinez RN  Safety Promotion/Fall Prevention:   safety round/check completed   lighting adjusted   chemotherapeutic precautions   clutter free environment maintained   increased rounding and observation  Intervention: Prevent Skin Injury  Recent Flowsheet Documentation  Taken 12/11/2024 0030 by Yvonne Martinez RN  Body Position: position changed independently  Taken 12/10/2024 2000 by Yvonne Martinez RN  Body Position: position changed independently  Goal: Readiness for Transition of Care  Recent Flowsheet Documentation  Taken 12/10/2024 2100 by Yvonne Martinez RN  Transportation Anticipated: family or friend will provide  Intervention: Mutually Develop Transition Plan  Recent Flowsheet Documentation  Taken 12/10/2024 2100 by Yvonne Martinez RN  Transportation Anticipated: family or friend will provide  Patient/Family Anticipates Transition to: home  Equipment Currently Used at Home: none     Problem: Comorbidity Management  Goal: Blood Glucose Levels Within Targeted Range  Outcome: Progressing     Problem: Comorbidity Management  Goal: Maintenance of Heart Failure Symptom Control  Outcome: Progressing      Problem: Comorbidity Management  Goal: Blood Pressure in Desired Range  Outcome: Progressing     Problem: UTI (Urinary Tract Infection)  Goal: Improved Infection Symptoms  Outcome: Progressing     Problem: Pain Acute  Goal: Optimal Pain Control and Function  Outcome: Progressing   Goal Outcome Evaluation:       Pt alert & oriented. Denies pain. Refused blood sugar check at 0200. Requested clustered cares. On room air, Vital Signs WNL. Will continue to monitor.

## 2024-12-11 NOTE — DISCHARGE SUMMARY
[facility] MEDICINE  DISCHARGE SUMMARY     Primary Care Physician: Ramiro Person  Admission Date: 12/8/2024   Discharge Provider: Elis Hampton MD Discharge Date: 12/11/2024   Diet:   Active Diet and Nourishment Order   Procedures    Combination Diet Moderate Consistent Carb (60 g CHO per Meal) Diet; Low Saturated Fat Na <2400mg Diet, No Caffeine Diet    Diet       Code Status: No CPR- Do NOT Intubate   Activity: DCACTIVITY: Activity as tolerated        Condition at Discharge: Stable     REASON FOR PRESENTATION(See Admission Note for Details)     fever    PRINCIPAL & ACTIVE DISCHARGE DIAGNOSES         PENDING LABS     Unresulted Labs Ordered in the Past 30 Days of this Admission       Date and Time Order Name Status Description    12/8/2024 10:04 PM Urine Culture Preliminary     12/8/2024  7:32 PM Blood Culture Peripheral Blood Preliminary               PROCEDURES ( this hospitalization only)          RECOMMENDATIONS TO OUTPATIENT PROVIDER FOR F/U VISIT     Follow-up Appointments       Hospital Follow-up with Existing Primary Care Provider (PCP)      Please see details below         Schedule Primary Care visit within: 7 Days   Recommended labs and Imaging (to be ordered by Primary Care Provider): BMP                 DISPOSITION     Home    SUMMARY OF HOSPITAL COURSE:      79M with DM2, COPD, bladder cancer (s/p TURBT) who presents with severe sepsis due to UTI after a surveillance cystoscopy 3 days prior.  Also with brief Afib with RVR that converted to NSR with lopressor.  Sepsis improved rapidly with ceftriaxone.  Mr. Kendrick will be discharged to home with omnicef for complicated UTI and cardiology follow-up.    #Severe Sepsis 2/2 UTI with hypotension and acute metabolic encephalopathy - surveillance cysto 3 days PTA    -cultures with Klebsiella and 2nd GNR.  Given rapid improvement with ceftriaxone will discharge with 3rd gen cephalosporin (omnicef) for 4 more  "days for complicated UTI.     #New onset Afib RVR -  converted to NSR with lopressor.  -start lopressor, Eliquis   -toprol-xl  -note interaction with primidone which would reduce anticoagulation effect of eliquis. With only a single brief episode Afib in setting of acute illness and only on a low dose of primidone.  I think Eliquis ok and will plan to discharge with event monitor and Afib clinic follow-up.  If Afib burden larger would transition to Coumadin.     #Acute HFpEF - IV diuresis with improvement in breathing to \"best its been in months\".  Transition to oral soon.     #Fall versus syncope at home - Likely 2/2 hypotension and sepsis.  PT/OT     #DM2   -home regimen: lantus 55, metformin, jardiance.    -Reduced dose lantus based on inpatient need.  May need to titrate back up as outpatient     #Hx of HTN - now on toprol and lasix.  Hold lisinopril for now.     #RLS? - primidone     #Bladder CA s/p TURBT  #BPH  - flomax     #COPD - no exacerbation.  Anoro, trelegy.  Azithro x 2 more days for bronchitis on CT.    Discharge Medications with Med changes:     Current Discharge Medication List        START taking these medications    Details   apixaban ANTICOAGULANT (ELIQUIS) 5 MG tablet Take 1 tablet (5 mg) by mouth 2 times daily.  Qty: 60 tablet, Refills: 2    Associated Diagnoses: Paroxysmal atrial fibrillation (H)      azithromycin (ZITHROMAX) 250 MG tablet Take 1 tablet (250 mg) by mouth daily for 2 days.  Qty: 2 tablet, Refills: 0    Associated Diagnoses: Sepsis, due to unspecified organism, unspecified whether acute organ dysfunction present (H)      cefdinir (OMNICEF) 300 MG capsule Take 1 capsule (300 mg) by mouth 2 times daily for 4 days.  Qty: 8 capsule, Refills: 0    Associated Diagnoses: Sepsis, due to unspecified organism, unspecified whether acute organ dysfunction present (H)      furosemide (LASIX) 20 MG tablet Take 1 tablet (20 mg) by mouth daily.  Qty: 30 tablet, Refills: 3    Associated " Diagnoses: Acute diastolic congestive heart failure (H)      metoprolol succinate ER (TOPROL XL) 50 MG 24 hr tablet Take 0.5 tablets (25 mg) by mouth daily.  Qty: 30 tablet, Refills: 3    Associated Diagnoses: Paroxysmal atrial fibrillation (H)           CONTINUE these medications which have CHANGED    Details   insulin glargine (LANTUS PEN) 100 UNIT/ML pen Inject 40 Units subcutaneously at bedtime.    Comments: If Lantus is not covered by insurance, may substitute Basaglar or Semglee or other insulin glargine product per insurance preference at same dose and frequency.    Associated Diagnoses: Type 2 diabetes mellitus without complication, with long-term current use of insulin (H)           CONTINUE these medications which have NOT CHANGED    Details   acetaminophen (TYLENOL) 500 MG tablet Take 1-2 tablets (500-1,000 mg) by mouth every 6 hours as needed for mild pain  Qty: 20 tablet, Refills: 1    Associated Diagnoses: Malignant neoplasm of urinary bladder, unspecified site (H)      albuterol (PROAIR HFA/PROVENTIL HFA/VENTOLIN HFA) 108 (90 Base) MCG/ACT inhaler Inhale 2 puffs into the lungs every 4 hours as needed for shortness of breath, wheezing or cough.  Qty: 18 g, Refills: 3    Comments: Pharmacy may dispense brand covered by insurance (Proair, or proventil or ventolin or generic albuterol inhaler)  Associated Diagnoses: COPD, group A, by GOLD 2017 classification (H)      albuterol (PROVENTIL) (2.5 MG/3ML) 0.083% neb solution Take 1 vial (2.5 mg) by nebulization every 6 hours as needed for shortness of breath, wheezing or cough.  Qty: 180 mL, Refills: 5    Associated Diagnoses: Chronic obstructive pulmonary disease, unspecified COPD type (H)      cetirizine (ZYRTEC) 10 MG tablet Take 1 tablet (10 mg) by mouth daily.  Qty: 30 tablet, Refills: 3    Associated Diagnoses: Acute rhinitis      empagliflozin (JARDIANCE) 25 MG TABS tablet Take 25 mg by mouth daily.      fluticasone (FLONASE) 50 MCG/ACT nasal spray  Spray 1 spray into both nostrils daily as needed for rhinitis or allergies.      Fluticasone-Umeclidin-Vilanterol (TRELEGY ELLIPTA) 200-62.5-25 MCG/ACT oral inhaler Inhale 1 puff into the lungs daily.  Qty: 60 each, Refills: 3    Associated Diagnoses: Chronic obstructive pulmonary disease, unspecified COPD type (H)      magnesium 250 MG tablet Take 250 mg by mouth at bedtime.      metFORMIN (GLUCOPHAGE) 500 MG tablet [METFORMIN (GLUCOPHAGE) 500 MG TABLET] Take 1,000 mg by mouth 2 (two) times a day with meals.      primidone (MYSOLINE) 50 MG tablet [PRIMIDONE (MYSOLINE) 50 MG TABLET] Take 50 mg by mouth at bedtime.      simvastatin (ZOCOR) 40 MG tablet Take 40 mg by mouth every evening      tamsulosin (FLOMAX) 0.4 MG capsule Take 0.4 mg by mouth daily.           STOP taking these medications       aspirin 81 MG EC tablet Comments:   Reason for Stopping:         lisinopril (PRINIVIL,ZESTRIL) 10 MG tablet Comments:   Reason for Stopping:                     Rationale for medication changes:      -Eliquis and toprol for Afib. Stop asa  -Lasix for HFpEF  -abx for UTI and bronchitis  -hold lisinopril with addition lasix and lopressor  -reduce insulin based on needs in hospital.  Might need to be titrated back up as outpatient.        Consults     PHARMACY TO DOSE VANCO  OCCUPATIONAL THERAPY ADULT IP CONSULT  PHYSICAL THERAPY ADULT IP CONSULT    Immunizations given this encounter     Most Recent Immunizations   Administered Date(s) Administered    COVID-19 12+ (MODERNA) 09/06/2024    COVID-19 Bivalent 12+ (Pfizer) 09/29/2022    COVID-19 MONOVALENT 12+ (Pfizer) 04/29/2022    COVID-19 Monovalent 18+ (Moderna) 11/30/2021    Influenza (High Dose) Trivalent,PF (Fluzone) 09/06/2024    Influenza (IIV3) PF 10/14/2013    Influenza (prior to 2024) 09/09/2009    Influenza Vaccine 65+ (FLUAD) 09/27/2023    Influenza Vaccine 65+ (Fluzone HD) 10/22/2020    Pneumo Conj 13-V (2010&after) 02/20/2020    Pneumococcal 23 valent 11/27/2007     RSV Vaccine (Arexvy) 04/04/2024    TDAP (Adacel,Boostrix) 04/17/2007    Zoster recombinant adjuvanted (SHINGRIX) 09/13/2024           Anticoagulation Information      Eliquis      SIGNIFICANT IMAGING FINDINGS     Results for orders placed or performed during the hospital encounter of 12/08/24   Head CT w/o contrast    Impression    IMPRESSION:  1.  No CT evidence for acute intracranial process.  2.  Brain atrophy and presumed chronic microvascular ischemic changes as above.   CT Cervical Spine w/o Contrast    Impression    IMPRESSION:  1.  No fracture or posttraumatic subluxation.  2.  No high-grade spinal canal or neural foraminal stenosis.    3.  No significant interval change.   CT Chest Pulmonary Embolism w Contrast    Impression    IMPRESSION:  1.  No evidence of acute trauma in the chest.  2.  No evidence of pulmonary embolus.  3.  Increasing size of 6 mm right upper lobe pulmonary nodule. 3 month follow-up chest CT recommended.  4.  Bibasilar bronchial wall thickening. Correlate clinically for bronchitis.   CT Abdomen Pelvis w Contrast    Impression    IMPRESSION:   1.  No evidence of acute trauma in the abdomen or pelvis.  2.  Small amount of gas within the bladder lumen. Correlate clinically for recent instrumentation.  3.  Abnormal bladder contour with localized wall thickening, intramural diverticula, and adjacent inflammatory stranding.   Echocardiogram Complete   Result Value Ref Range    LVEF  50-55%          Discharge Orders        Adult Cardiology Eval  Referral      Follow-Up with Cardiology      Reason for your hospital stay    You were admitted with a urinary tract infection and atrial fibrillation     Activity    Your activity upon discharge: activity as tolerated     Adult Cardiac Event Monitor     Diet    Follow this diet upon discharge: Current Diet:Orders Placed This Encounter      Combination Diet Moderate Consistent Carb (60 g CHO per Meal) Diet; Low Saturated Fat Na <2400mg Diet,  No Caffeine Diet     Hospital Follow-up with Existing Primary Care Provider (PCP)    Please see details below            Examination   Physical Exam   Temp:  [96  F (35.6  C)-97.8  F (36.6  C)] 96  F (35.6  C)  Pulse:  [60-76] 68  Resp:  [22-26] 22  BP: (103-130)/(53-63) 127/63  SpO2:  [91 %-93 %] 93 %  Wt Readings from Last 1 Encounters:   12/08/24 80.5 kg (177 lb 8 oz)       Feeling great.  Feels ready to go home.  Breathing best its been in months  Tele without Afib      Please see EMR for more detailed significant labs, imaging, consultant notes etc.    I, Elis Hampton MD, personally saw the patient today and spent greater than 30 minutes discharging this patient.    Elis Hampton MD  [facility]    CC:Ramiro Person

## 2024-12-11 NOTE — PROGRESS NOTES
Patient alert, oriented, discharged home with daughter. Prescriptions given, patient verbalized discharge instructions.

## 2024-12-13 LAB
ATRIAL RATE - MUSE: 22 BPM
BACTERIA BLD CULT: NORMAL
DIASTOLIC BLOOD PRESSURE - MUSE: 69 MMHG
INTERPRETATION ECG - MUSE: NORMAL
P AXIS - MUSE: NORMAL DEGREES
PR INTERVAL - MUSE: NORMAL MS
QRS DURATION - MUSE: 94 MS
QT - MUSE: 344 MS
QTC - MUSE: 499 MS
R AXIS - MUSE: 96 DEGREES
SYSTOLIC BLOOD PRESSURE - MUSE: 132 MMHG
T AXIS - MUSE: 67 DEGREES
VENTRICULAR RATE- MUSE: 127 BPM

## 2024-12-14 LAB — BACTERIA BLD CULT: NO GROWTH

## 2024-12-18 ENCOUNTER — LAB REQUISITION (OUTPATIENT)
Dept: LAB | Facility: CLINIC | Age: 79
End: 2024-12-18
Payer: MEDICARE

## 2024-12-18 ENCOUNTER — TRANSFERRED RECORDS (OUTPATIENT)
Dept: HEALTH INFORMATION MANAGEMENT | Facility: CLINIC | Age: 79
End: 2024-12-18

## 2024-12-18 DIAGNOSIS — I50.32 CHRONIC DIASTOLIC (CONGESTIVE) HEART FAILURE (H): ICD-10-CM

## 2024-12-18 PROCEDURE — 80048 BASIC METABOLIC PNL TOTAL CA: CPT | Mod: ORL | Performed by: FAMILY MEDICINE

## 2024-12-19 LAB
ANION GAP SERPL CALCULATED.3IONS-SCNC: 13 MMOL/L (ref 7–15)
BUN SERPL-MCNC: 14.5 MG/DL (ref 8–23)
CALCIUM SERPL-MCNC: 9 MG/DL (ref 8.8–10.4)
CHLORIDE SERPL-SCNC: 99 MMOL/L (ref 98–107)
CREAT SERPL-MCNC: 0.85 MG/DL (ref 0.67–1.17)
EGFRCR SERPLBLD CKD-EPI 2021: 88 ML/MIN/1.73M2
GLUCOSE SERPL-MCNC: 80 MG/DL (ref 70–99)
HCO3 SERPL-SCNC: 28 MMOL/L (ref 22–29)
POTASSIUM SERPL-SCNC: 4.3 MMOL/L (ref 3.4–5.3)
SODIUM SERPL-SCNC: 140 MMOL/L (ref 135–145)

## 2025-01-19 ENCOUNTER — APPOINTMENT (OUTPATIENT)
Dept: CT IMAGING | Facility: HOSPITAL | Age: 80
End: 2025-01-19
Attending: FAMILY MEDICINE
Payer: MEDICARE

## 2025-01-19 ENCOUNTER — HOSPITAL ENCOUNTER (EMERGENCY)
Facility: HOSPITAL | Age: 80
Discharge: HOME | End: 2025-01-19
Attending: FAMILY MEDICINE | Admitting: FAMILY MEDICINE
Payer: MEDICARE

## 2025-01-19 ENCOUNTER — APPOINTMENT (OUTPATIENT)
Dept: RADIOLOGY | Facility: HOSPITAL | Age: 80
End: 2025-01-19
Attending: FAMILY MEDICINE
Payer: MEDICARE

## 2025-01-19 VITALS
RESPIRATION RATE: 28 BRPM | WEIGHT: 177 LBS | OXYGEN SATURATION: 92 % | TEMPERATURE: 98.2 F | HEIGHT: 65 IN | DIASTOLIC BLOOD PRESSURE: 55 MMHG | HEART RATE: 87 BPM | BODY MASS INDEX: 29.49 KG/M2 | SYSTOLIC BLOOD PRESSURE: 107 MMHG

## 2025-01-19 DIAGNOSIS — J10.1 INFLUENZA A: ICD-10-CM

## 2025-01-19 DIAGNOSIS — R53.1 GENERAL WEAKNESS: ICD-10-CM

## 2025-01-19 LAB
ALBUMIN SERPL BCG-MCNC: 4.1 G/DL (ref 3.5–5.2)
ALBUMIN UR-MCNC: NEGATIVE MG/DL
ALP SERPL-CCNC: 74 U/L (ref 40–150)
ALT SERPL W P-5'-P-CCNC: 21 U/L (ref 0–70)
ANION GAP SERPL CALCULATED.3IONS-SCNC: 14 MMOL/L (ref 7–15)
APPEARANCE UR: CLEAR
AST SERPL W P-5'-P-CCNC: 29 U/L (ref 0–45)
BASE EXCESS BLDV CALC-SCNC: 2.5 MMOL/L (ref -3–3)
BASOPHILS # BLD AUTO: 0.1 10E3/UL (ref 0–0.2)
BASOPHILS NFR BLD AUTO: 1 %
BILIRUB DIRECT SERPL-MCNC: <0.2 MG/DL (ref 0–0.3)
BILIRUB SERPL-MCNC: 0.3 MG/DL
BILIRUB UR QL STRIP: NEGATIVE
BUN SERPL-MCNC: 24.2 MG/DL (ref 8–23)
CALCIUM SERPL-MCNC: 8.8 MG/DL (ref 8.8–10.4)
CHLORIDE SERPL-SCNC: 98 MMOL/L (ref 98–107)
COLOR UR AUTO: ABNORMAL
CREAT SERPL-MCNC: 1.15 MG/DL (ref 0.67–1.17)
EGFRCR SERPLBLD CKD-EPI 2021: 64 ML/MIN/1.73M2
EOSINOPHIL # BLD AUTO: 0.2 10E3/UL (ref 0–0.7)
EOSINOPHIL NFR BLD AUTO: 2 %
ERYTHROCYTE [DISTWIDTH] IN BLOOD BY AUTOMATED COUNT: 14.5 % (ref 10–15)
FLUAV RNA SPEC QL NAA+PROBE: POSITIVE
FLUBV RNA RESP QL NAA+PROBE: NEGATIVE
GLUCOSE BLDC GLUCOMTR-MCNC: 267 MG/DL (ref 70–99)
GLUCOSE SERPL-MCNC: 187 MG/DL (ref 70–99)
GLUCOSE UR STRIP-MCNC: >1000 MG/DL
HCO3 BLDV-SCNC: 29 MMOL/L (ref 21–28)
HCO3 SERPL-SCNC: 25 MMOL/L (ref 22–29)
HCT VFR BLD AUTO: 47.6 % (ref 40–53)
HGB BLD-MCNC: 15.2 G/DL (ref 13.3–17.7)
HGB UR QL STRIP: NEGATIVE
IMM GRANULOCYTES # BLD: 0 10E3/UL
IMM GRANULOCYTES NFR BLD: 1 %
KETONES UR STRIP-MCNC: NEGATIVE MG/DL
LACTATE SERPL-SCNC: 1.4 MMOL/L (ref 0.7–2)
LACTATE SERPL-SCNC: 2.3 MMOL/L (ref 0.7–2)
LEUKOCYTE ESTERASE UR QL STRIP: ABNORMAL
LYMPHOCYTES # BLD AUTO: 1.2 10E3/UL (ref 0.8–5.3)
LYMPHOCYTES NFR BLD AUTO: 18 %
MAGNESIUM SERPL-MCNC: 2.2 MG/DL (ref 1.7–2.3)
MCH RBC QN AUTO: 28.6 PG (ref 26.5–33)
MCHC RBC AUTO-ENTMCNC: 31.9 G/DL (ref 31.5–36.5)
MCV RBC AUTO: 90 FL (ref 78–100)
MONOCYTES # BLD AUTO: 0.9 10E3/UL (ref 0–1.3)
MONOCYTES NFR BLD AUTO: 13 %
NEUTROPHILS # BLD AUTO: 4.3 10E3/UL (ref 1.6–8.3)
NEUTROPHILS NFR BLD AUTO: 65 %
NITRATE UR QL: NEGATIVE
NRBC # BLD AUTO: 0 10E3/UL
NRBC BLD AUTO-RTO: 0 /100
O2/TOTAL GAS SETTING VFR VENT: 21 %
OXYHGB MFR BLDV: 52 % (ref 70–75)
PCO2 BLDV: 52 MM HG (ref 40–50)
PH BLDV: 7.36 [PH] (ref 7.32–7.43)
PH UR STRIP: 5 [PH] (ref 5–7)
PLATELET # BLD AUTO: 153 10E3/UL (ref 150–450)
PO2 BLDV: 30 MM HG (ref 25–47)
POTASSIUM SERPL-SCNC: 4.6 MMOL/L (ref 3.4–5.3)
PROCALCITONIN SERPL IA-MCNC: 0.09 NG/ML
PROT SERPL-MCNC: 7.2 G/DL (ref 6.4–8.3)
RBC # BLD AUTO: 5.31 10E6/UL (ref 4.4–5.9)
RBC URINE: 2 /HPF
RSV RNA SPEC NAA+PROBE: NEGATIVE
SAO2 % BLDV: 53.2 % (ref 70–75)
SARS-COV-2 RNA RESP QL NAA+PROBE: NEGATIVE
SODIUM SERPL-SCNC: 137 MMOL/L (ref 135–145)
SP GR UR STRIP: 1.02 (ref 1–1.03)
TROPONIN T SERPL HS-MCNC: 15 NG/L
UROBILINOGEN UR STRIP-MCNC: <2 MG/DL
WBC # BLD AUTO: 6.6 10E3/UL (ref 4–11)
WBC URINE: 1 /HPF

## 2025-01-19 PROCEDURE — 84145 PROCALCITONIN (PCT): CPT | Performed by: FAMILY MEDICINE

## 2025-01-19 PROCEDURE — 36415 COLL VENOUS BLD VENIPUNCTURE: CPT | Performed by: FAMILY MEDICINE

## 2025-01-19 PROCEDURE — 71045 X-RAY EXAM CHEST 1 VIEW: CPT

## 2025-01-19 PROCEDURE — 96360 HYDRATION IV INFUSION INIT: CPT

## 2025-01-19 PROCEDURE — 99285 EMERGENCY DEPT VISIT HI MDM: CPT | Mod: 25

## 2025-01-19 PROCEDURE — 87637 SARSCOV2&INF A&B&RSV AMP PRB: CPT | Performed by: FAMILY MEDICINE

## 2025-01-19 PROCEDURE — 93005 ELECTROCARDIOGRAM TRACING: CPT | Performed by: FAMILY MEDICINE

## 2025-01-19 PROCEDURE — 83605 ASSAY OF LACTIC ACID: CPT | Performed by: FAMILY MEDICINE

## 2025-01-19 PROCEDURE — 85041 AUTOMATED RBC COUNT: CPT | Performed by: FAMILY MEDICINE

## 2025-01-19 PROCEDURE — 84155 ASSAY OF PROTEIN SERUM: CPT | Performed by: FAMILY MEDICINE

## 2025-01-19 PROCEDURE — 83735 ASSAY OF MAGNESIUM: CPT | Performed by: FAMILY MEDICINE

## 2025-01-19 PROCEDURE — 94640 AIRWAY INHALATION TREATMENT: CPT

## 2025-01-19 PROCEDURE — 999N000157 HC STATISTIC RCP TIME EA 10 MIN

## 2025-01-19 PROCEDURE — 82805 BLOOD GASES W/O2 SATURATION: CPT | Performed by: FAMILY MEDICINE

## 2025-01-19 PROCEDURE — 82962 GLUCOSE BLOOD TEST: CPT

## 2025-01-19 PROCEDURE — 82310 ASSAY OF CALCIUM: CPT | Performed by: FAMILY MEDICINE

## 2025-01-19 PROCEDURE — 258N000003 HC RX IP 258 OP 636: Performed by: FAMILY MEDICINE

## 2025-01-19 PROCEDURE — 250N000009 HC RX 250: Performed by: FAMILY MEDICINE

## 2025-01-19 PROCEDURE — 80076 HEPATIC FUNCTION PANEL: CPT | Performed by: FAMILY MEDICINE

## 2025-01-19 PROCEDURE — 84484 ASSAY OF TROPONIN QUANT: CPT | Performed by: FAMILY MEDICINE

## 2025-01-19 PROCEDURE — 85004 AUTOMATED DIFF WBC COUNT: CPT | Performed by: FAMILY MEDICINE

## 2025-01-19 PROCEDURE — 81001 URINALYSIS AUTO W/SCOPE: CPT | Performed by: FAMILY MEDICINE

## 2025-01-19 PROCEDURE — 250N000013 HC RX MED GY IP 250 OP 250 PS 637: Performed by: FAMILY MEDICINE

## 2025-01-19 PROCEDURE — 70450 CT HEAD/BRAIN W/O DYE: CPT

## 2025-01-19 RX ORDER — OSELTAMIVIR PHOSPHATE 75 MG/1
75 CAPSULE ORAL ONCE
Status: COMPLETED | OUTPATIENT
Start: 2025-01-19 | End: 2025-01-19

## 2025-01-19 RX ORDER — OSELTAMIVIR PHOSPHATE 30 MG/1
30 CAPSULE ORAL 2 TIMES DAILY
Qty: 10 CAPSULE | Refills: 0 | Status: SHIPPED | OUTPATIENT
Start: 2025-01-19 | End: 2025-01-24

## 2025-01-19 RX ORDER — IPRATROPIUM BROMIDE AND ALBUTEROL SULFATE 2.5; .5 MG/3ML; MG/3ML
3 SOLUTION RESPIRATORY (INHALATION) ONCE
Status: COMPLETED | OUTPATIENT
Start: 2025-01-19 | End: 2025-01-19

## 2025-01-19 RX ADMIN — IPRATROPIUM BROMIDE AND ALBUTEROL SULFATE 3 ML: .5; 3 SOLUTION RESPIRATORY (INHALATION) at 17:17

## 2025-01-19 RX ADMIN — SODIUM CHLORIDE 1000 ML: 9 INJECTION, SOLUTION INTRAVENOUS at 17:42

## 2025-01-19 RX ADMIN — OSELTAMIVIR PHOSPHATE 75 MG: 75 CAPSULE ORAL at 20:28

## 2025-01-19 ASSESSMENT — ACTIVITIES OF DAILY LIVING (ADL)
ADLS_ACUITY_SCORE: 62
ADLS_ACUITY_SCORE: 60
ADLS_ACUITY_SCORE: 60
ADLS_ACUITY_SCORE: 62
ADLS_ACUITY_SCORE: 62

## 2025-01-19 NOTE — ED PROVIDER NOTES
EMERGENCY DEPARTMENT ENCOUNTER      NAME: Kelechi Kendrick  AGE: 79 year old male  YOB: 1945  MRN: [id number]  EVALUATION DATE & TIME: 1/19/2025  4:40 PM    PCP: Ramiro Person    ED PROVIDER: Juanjo Hassan M.D.    Chief Complaint   Patient presents with    Hyperglycemia    Generalized Weakness       FINAL IMPRESSION:  1. General weakness    2. Influenza A        ED COURSE & MEDICAL DECISION MAKING:    Pertinent Labs & Imaging studies independently interpreted by me. (See chart for details)  Reviewed most recent hospital admission from December 2024 when patient was seen with COPD exacerbation and sepsis along with respiratory failure, patient was placed on Lopressor and discharged    ED Course as of 01/19/25 2037   Sun Jan 19, 2025   1652 I attempted to meet with the patient, however, he had not yet been moved to his room from triage. Discussed the patient's case preliminarily with the his nurse as well.    1707 I met with the patient for the initial interview and physical examination. Discussed plan for treatment and workup in the ED.     1712 Patient initially seen in triage for neurologic evaluation.  Patient with generalized weakness he says starting around midnight.  Last known normal was at noon, patient says at midnight he woke up on the floor.  Denies pain or injury, family notes today he is sleeping more than usual, poor appetite.  Patient himself just feels weak but no other complaints.  On exam here, no external signs of head trauma, trace expiratory wheezes consistent with patient's diagnosis of COPD, trace heart murmur, no abdominal tenderness, no lower extremity tenderness, catheter is in place.  Consider urinary tract infection, pneumonia, respiratory viral illness, electrolyte disturbance.  Given fall, head CT will be ordered and evaluate for possible syncope although patient does not remember being lightheaded or passing out.   1847 Chest x-ray independently  interpreted by me effusion or infiltrate   1847 Labs independently interpreted by me influenza a positive which likely is source of patient's fatigue and other symptoms today.   1858 CT scan of the head independently interpreted by me negative for acute findings, radiology interpretation agrees.   1932 Updated patient with findings and plan so far, patient is motivated to go home.  Analysis is pending and, given this is a catheter sample will likely be consistent with infection but patient has no abdominal pain and his alternative explanation for symptoms today.   2004 Urinalysis independently interpreted by me negative for infection.  Patient stable for discharge, waiting for [facility] to check in case renal dosing Tamiflu is needed.   2036 Updated patient and family with plan, stable for discharge.     At the conclusion of the encounter I discussed the results of all of the tests and the disposition. The questions were answered. The patient or family acknowledged understanding and was agreeable with the care plan.     Medical Decision Making  Obtained supplemental history:Supplemental history obtained?: Family Member/Significant Other  Reviewed external records: External records reviewed?: Outpatient Record: 12/18/2024, [facility]  Care impacted by chronic illness:Anticoagulated State, Cancer/Chemotherapy, Chronic Lung Disease, Diabetes, Heart Disease, Hyperlipidemia, Hypertension, and Smoking / Nicotine Use  Did you consider but not order tests?: Work up considered but not performed and documented in chart, if applicable  Did you interpret images independently?: Independent interpretation of ECG and images noted in documentation, when applicable.  Consultation discussion with other provider:Did you involve another provider (consultant, , pharmacy, etc.)?: No  Discharge. I prescribed additional prescription strength medication(s) as charted. I considered admission, but discharged the patient after share  decision making conversation.    MIPS: Not Applicable    MEDICATIONS GIVEN IN THE EMERGENCY:  Medications   ipratropium - albuterol 0.5 mg/2.5 mg/3 mL (DUONEB) neb solution 3 mL (3 mLs Nebulization $Given 1/19/25 1717)   sodium chloride 0.9% BOLUS 1,000 mL (0 mLs Intravenous Stopped 1/19/25 1903)   oseltamivir (TAMIFLU) capsule 75 mg (75 mg Oral $Given 1/19/25 2028)       NEW PRESCRIPTIONS STARTED AT TODAY'S ER VISIT  New Prescriptions    OSELTAMIVIR (TAMIFLU) 30 MG CAPSULE    Take 1 capsule (30 mg) by mouth 2 times daily for 5 days.       =================================================================    HPI    Patient information was obtained from: Patient, patient's family members      Kelechi Kendrick is a 79 year old male with a pertinent history of HFpEF, paroxysmal atrial fibrillation anticoagulated on Eliquis, type 2 diabetes, COPD, hypertension, bladder cancer, hyperlipidemia, nicotine dependence, sepsis who presents to this ED by walk-in for evaluation of hyperglycemia and generalized weakness.     Patient was seen 12/18/2024 (1 month ago) at [facility] for a follow-up appointment after hospitalization at [facility] 12/8/2024-12/11/2024 for management of severe sepsis due to UTI after bladder cystoscopy. He was treated with ceftriaxone with improvement of symptoms, later discharged on another 4 days cefdinir. Since then, had been recovering fairly well.     Per patient: Patient reports waking up on the floor near his bed around midnight of last night; no pain or injuries with this. Now in the emergency department, the patient endorses feeling generally weak with a mild cough as well. Patient lives independently at home, ambulating independently.     Patient denies urinary symptoms, abdominal  pain, shortness of breath, vomiting, diarrhea, fever, chills, or any other symptoms at this time.     Per patient's family members: Patient's last known well was yesterday  at noon. Today, his wife noticed that he has seemed confused and has not been finishing his sentences. Patient was able to eat some food for lunch, however, not as much as he usually would. He takes insulin for diabetes but forgets to do so if he does not eat dinner; he did not eat dinner last evening.       REVIEW OF SYSTEMS   Review of Systems   All other systems reviewed and negative    PAST MEDICAL HISTORY:  Past Medical History:   Diagnosis Date    Bladder cancer (H)     COPD (chronic obstructive pulmonary disease) (H)     Diabetes mellitus, type II (H)     Dupuytren contracture     HLD (hyperlipidemia)     Hyperlipidemia     Hypertension     Personal history of malignant neoplasm of bladder     Plantar fasciitis     right    Tremor     Umbilical hernia with obstruction     Urinary tract infection        PAST SURGICAL HISTORY:  Past Surgical History:   Procedure Laterality Date    CYSTOSCOPY, TRANSURETHRAL RESECTION (TUR) TUMOR BLADDER, COMBINED N/A 10/10/2023    Procedure: CYSTOSCOPY, BIPOLAR TRANSURETHRAL RESECTION BLADDER TUMOR;  Surgeon: Derik Hernández MD;  Location: [facility] OR    CYSTOSCOPY, TRANSURETHRAL RESECTION (TUR) TUMOR BLADDER, COMBINED N/A 3/11/2024    Procedure: CYSTOSCOPY, TRANSURETHRAL RESECTION BLADDER TUMOR;  Surgeon: Derik Hernández MD;  Location: [facility] OR    HERNIA REPAIR      LEFT TRIGGER THUMB SURGICAL RELEASE Left 08/24/2010    Dr. Reddy    ORTHOPEDIC SURGERY         CURRENT MEDICATIONS:    No current facility-administered medications for this encounter.     Current Outpatient Medications   Medication Sig Dispense Refill    acetaminophen (TYLENOL) 500 MG tablet Take 1-2 tablets (500-1,000 mg) by mouth every 6 hours as needed for mild pain 20 tablet 1    albuterol (PROAIR HFA/PROVENTIL HFA/VENTOLIN HFA) 108 (90 Base) MCG/ACT inhaler Inhale 2 puffs into the lungs every 4 hours as needed for shortness of breath, wheezing or cough. 18 g 3    albuterol  (PROVENTIL) (2.5 MG/3ML) 0.083% neb solution Take 1 vial (2.5 mg) by nebulization every 6 hours as needed for shortness of breath, wheezing or cough. 180 mL 5    apixaban ANTICOAGULANT (ELIQUIS) 5 MG tablet Take 1 tablet (5 mg) by mouth 2 times daily. 60 tablet 2    cetirizine (ZYRTEC) 10 MG tablet Take 1 tablet (10 mg) by mouth daily. 30 tablet 3    empagliflozin (JARDIANCE) 25 MG TABS tablet Take 25 mg by mouth daily.      fluticasone (FLONASE) 50 MCG/ACT nasal spray Spray 1 spray into both nostrils daily as needed for rhinitis or allergies.      Fluticasone-Umeclidin-Vilanterol (TRELEGY ELLIPTA) 200-62.5-25 MCG/ACT oral inhaler Inhale 1 puff into the lungs daily. 60 each 3    furosemide (LASIX) 20 MG tablet Take 1 tablet (20 mg) by mouth daily. 30 tablet 3    insulin glargine (LANTUS PEN) 100 UNIT/ML pen Inject 40 Units subcutaneously at bedtime.      magnesium 250 MG tablet Take 250 mg by mouth at bedtime.      metFORMIN (GLUCOPHAGE) 500 MG tablet [METFORMIN (GLUCOPHAGE) 500 MG TABLET] Take 1,000 mg by mouth 2 (two) times a day with meals.      metoprolol succinate ER (TOPROL XL) 50 MG 24 hr tablet Take 0.5 tablets (25 mg) by mouth daily. 30 tablet 3    oseltamivir (TAMIFLU) 30 MG capsule Take 1 capsule (30 mg) by mouth 2 times daily for 5 days. 10 capsule 0    primidone (MYSOLINE) 50 MG tablet [PRIMIDONE (MYSOLINE) 50 MG TABLET] Take 50 mg by mouth at bedtime.      simvastatin (ZOCOR) 40 MG tablet Take 40 mg by mouth every evening      tamsulosin (FLOMAX) 0.4 MG capsule Take 0.4 mg by mouth daily.         ALLERGIES:  Allergies   Allergen Reactions    Tdvax [Tetanus-Diphtheria Toxoids Td]      Other reaction(s): swelling    Tetanus Toxoid, Adsorbed [Tetanus Toxoid] Swelling    Naproxen Rash     Other reaction(s): rash, nausea       FAMILY HISTORY:  No family history on file.    SOCIAL HISTORY:   Social History     Socioeconomic History    Marital status:    Tobacco Use    Smoking status: Former      Current packs/day: 0.40     Average packs/day: 0.4 packs/day for 30.0 years (12.0 ttl pk-yrs)     Types: Cigarettes    Smokeless tobacco: Never   Vaping Use    Vaping status: Never Used   Substance and Sexual Activity    Alcohol use: No    Drug use: No     Social Drivers of Health     Financial Resource Strain: Low Risk  (12/10/2024)    Financial Resource Strain     Within the past 12 months, have you or your family members you live with been unable to get utilities (heat, electricity) when it was really needed?: No   Food Insecurity: Low Risk  (12/10/2024)    Food Insecurity     Within the past 12 months, did you worry that your food would run out before you got money to buy more?: No     Within the past 12 months, did the food you bought just not last and you didn t have money to get more?: No   Transportation Needs: Low Risk  (12/10/2024)    Transportation Needs     Within the past 12 months, has lack of transportation kept you from medical appointments, getting your medicines, non-medical meetings or appointments, work, or from getting things that you need?: No    Received from [facility], [facility]    Social Connections   Interpersonal Safety: Low Risk  (12/10/2024)    Interpersonal Safety     Do you feel physically and emotionally safe where you currently live?: Yes     Within the past 12 months, have you been hit, slapped, kicked or otherwise physically hurt by someone?: No     Within the past 12 months, have you been humiliated or emotionally abused in other ways by your partner or ex-partner?: No   Recent Concern: Interpersonal Safety - High Risk (10/25/2024)    Interpersonal Safety     Do you feel physically and emotionally safe where you currently live?: No     Within the past 12 months, have you been hit, slapped, kicked or otherwise physically hurt by someone?: No     Within the past 12 months, have you been humiliated or emotionally  "abused in other ways by your partner or ex-partner?: No   Housing Stability: Low Risk  (12/10/2024)    Housing Stability     Do you have housing? : Yes     Are you worried about losing your housing?: No       VITALS:  /57   Pulse 86   Temp 98.2  F (36.8  C) (Oral)   Resp 22   Ht 1.651 m (5' 5\")   Wt 80.3 kg (177 lb)   SpO2 92%   BMI 29.45 kg/m      PHYSICAL EXAM:  Physical Exam  Vitals and nursing note reviewed.   Constitutional:       Appearance: Normal appearance.   HENT:      Head: Normocephalic and atraumatic.      Right Ear: External ear normal.      Left Ear: External ear normal.      Nose: Nose normal.      Mouth/Throat:      Mouth: Mucous membranes are moist.   Eyes:      Extraocular Movements: Extraocular movements intact.      Conjunctiva/sclera: Conjunctivae normal.      Pupils: Pupils are equal, round, and reactive to light.   Cardiovascular:      Rate and Rhythm: Normal rate and regular rhythm.      Heart sounds: Murmur heard.      Systolic murmur is present with a grade of 3/6.   Pulmonary:      Effort: Pulmonary effort is normal.      Breath sounds: Wheezing (coarse, bilaterally) present. No rales.   Abdominal:      General: Abdomen is flat. There is no distension.      Palpations: Abdomen is soft.      Tenderness: There is no abdominal tenderness. There is no guarding.   Musculoskeletal:         General: Normal range of motion.      Cervical back: Normal range of motion and neck supple.      Right lower leg: No edema.      Left lower leg: No edema.   Lymphadenopathy:      Cervical: No cervical adenopathy.   Skin:     General: Skin is warm and dry.   Neurological:      General: No focal deficit present.      Mental Status: He is alert and oriented to person, place, and time. Mental status is at baseline.      Comments: No gross focal neurologic deficits   Psychiatric:         Mood and Affect: Mood normal.         Behavior: Behavior normal.         Thought Content: Thought content normal. "          LAB:  All pertinent labs reviewed and interpreted.  Results for orders placed or performed during the hospital encounter of 01/19/25   Head CT w/o contrast    Impression    IMPRESSION:  1.  No CT evidence for acute intracranial process.  2.  Brain atrophy and presumed chronic microvascular ischemic changes as above.   XR Chest 1 View    Impression    IMPRESSION: No focal airspace consolidation. Mild left basilar atelectasis. No pleural effusion or pneumothorax.    Heart size is normal. Atherosclerotic calcifications of the thoracic aorta.    Old fracture deformities of the posterior left third and fourth ribs.   Glucose by meter   Result Value Ref Range    GLUCOSE BY METER POCT 267 (H) 70 - 99 mg/dL   Hepatic function panel   Result Value Ref Range    Protein Total 7.2 6.4 - 8.3 g/dL    Albumin 4.1 3.5 - 5.2 g/dL    Bilirubin Total 0.3 <=1.2 mg/dL    Alkaline Phosphatase 74 40 - 150 U/L    AST 29 0 - 45 U/L    ALT 21 0 - 70 U/L    Bilirubin Direct <0.20 0.00 - 0.30 mg/dL   Lactic acid whole blood with 1x repeat in 2 hr when >2   Result Value Ref Range    Lactic Acid, Initial 2.3 (H) 0.7 - 2.0 mmol/L   Result Value Ref Range    Procalcitonin 0.09 <0.50 ng/mL   Result Value Ref Range    Magnesium 2.2 1.7 - 2.3 mg/dL   Result Value Ref Range    Troponin T, High Sensitivity 15 <=22 ng/L   UA with Microscopic reflex to Culture    Specimen: Urine, NOS   Result Value Ref Range    Color Urine Light Yellow Colorless, Straw, Light Yellow, Yellow    Appearance Urine Clear Clear    Glucose Urine >1000 (A) Negative mg/dL    Bilirubin Urine Negative Negative    Ketones Urine Negative Negative mg/dL    Specific Gravity Urine 1.022 1.001 - 1.030    Blood Urine Negative Negative    pH Urine 5.0 5.0 - 7.0    Protein Albumin Urine Negative Negative mg/dL    Urobilinogen Urine <2.0 <2.0 mg/dL    Nitrite Urine Negative Negative    Leukocyte Esterase Urine 25 Carlos/uL (A) Negative    RBC Urine 2 <=2 /HPF    WBC Urine 1 <=5 /HPF    Influenza A/B, RSV and SARS-CoV2 PCR (COVID-19) Nasopharyngeal    Specimen: Nasopharyngeal; Swab   Result Value Ref Range    Influenza A PCR Positive (A) Negative    Influenza B PCR Negative Negative    RSV PCR Negative Negative    SARS CoV2 PCR Negative Negative   Blood gas venous   Result Value Ref Range    pH Venous 7.36 7.32 - 7.43    pCO2 Venous 52 (H) 40 - 50 mm Hg    pO2 Venous 30 25 - 47 mm Hg    Bicarbonate Venous 29 (H) 21 - 28 mmol/L    Base Excess/Deficit Venous 2.5 -3.0 - 3.0 mmol/L    FIO2 21     Oxyhemoglobin Venous 52 (L) 70 - 75 %    O2 Sat, Venous 53.2 (L) 70.0 - 75.0 %   CBC with platelets and differential   Result Value Ref Range    WBC Count 6.6 4.0 - 11.0 10e3/uL    RBC Count 5.31 4.40 - 5.90 10e6/uL    Hemoglobin 15.2 13.3 - 17.7 g/dL    Hematocrit 47.6 40.0 - 53.0 %    MCV 90 78 - 100 fL    MCH 28.6 26.5 - 33.0 pg    MCHC 31.9 31.5 - 36.5 g/dL    RDW 14.5 10.0 - 15.0 %    Platelet Count 153 150 - 450 10e3/uL    % Neutrophils 65 %    % Lymphocytes 18 %    % Monocytes 13 %    % Eosinophils 2 %    % Basophils 1 %    % Immature Granulocytes 1 %    NRBCs per 100 WBC 0 <1 /100    Absolute Neutrophils 4.3 1.6 - 8.3 10e3/uL    Absolute Lymphocytes 1.2 0.8 - 5.3 10e3/uL    Absolute Monocytes 0.9 0.0 - 1.3 10e3/uL    Absolute Eosinophils 0.2 0.0 - 0.7 10e3/uL    Absolute Basophils 0.1 0.0 - 0.2 10e3/uL    Absolute Immature Granulocytes 0.0 <=0.4 10e3/uL    Absolute NRBCs 0.0 10e3/uL   Lactic acid whole blood   Result Value Ref Range    Lactic Acid 1.4 0.7 - 2.0 mmol/L   Basic metabolic panel   Result Value Ref Range    Sodium 137 135 - 145 mmol/L    Potassium 4.6 3.4 - 5.3 mmol/L    Chloride 98 98 - 107 mmol/L    Carbon Dioxide (CO2) 25 22 - 29 mmol/L    Anion Gap 14 7 - 15 mmol/L    Urea Nitrogen 24.2 (H) 8.0 - 23.0 mg/dL    Creatinine 1.15 0.67 - 1.17 mg/dL    GFR Estimate 64 >60 mL/min/1.73m2    Calcium 8.8 8.8 - 10.4 mg/dL    Glucose 187 (H) 70 - 99 mg/dL       RADIOLOGY:  Reviewed all pertinent  imaging. Please see official radiology report.  XR Chest 1 View   Final Result   IMPRESSION: No focal airspace consolidation. Mild left basilar atelectasis. No pleural effusion or pneumothorax.      Heart size is normal. Atherosclerotic calcifications of the thoracic aorta.      Old fracture deformities of the posterior left third and fourth ribs.      Head CT w/o contrast   Final Result   IMPRESSION:   1.  No CT evidence for acute intracranial process.   2.  Brain atrophy and presumed chronic microvascular ischemic changes as above.          I, Cheng Cintron, am serving as a scribe to document services personally performed by Dr. Hassan based on my observation and the provider's statements to me. I, Juanjo Hassan MD attest that Cheng Cintron is acting in a scribe capacity, has observed my performance of the services and has documented them in accordance with my direction.    Juanjo Hassan M.D.  Emergency Medicine  [facility] EMERGENCY DEPARTMENT  [facility] [facility] [address]  [phone]  Dept: [phone]       Juanjo Hassan MD  01/19/25 1762

## 2025-01-19 NOTE — ED TRIAGE NOTES
The pt lives alone; presents with son and grandaughter. The pt feels he has been weak since 0000 last night. Family took his Blood sugar which was in the 300's; takes lantus at home. . The son states he was seen yesterday at 1200 and pt was at baseline. Son states family called this am and states at 0900 am the patient was confused sentences not making sense. Neuro eval called triage Arthur 2.      Triage Assessment (Adult)       Row Name 01/19/25 3583          Triage Assessment    Airway WDL WDL        Respiratory WDL    Respiratory WDL X;cough     Cough Frequency infrequent        Skin Circulation/Temperature WDL    Skin Circulation/Temperature WDL X;circulation     Skin Circulation pallor        Cardiac WDL    Cardiac WDL WDL        Peripheral/Neurovascular WDL    Peripheral Neurovascular WDL WDL        Cognitive/Neuro/Behavioral WDL    Cognitive/Neuro/Behavioral WDL X;level of consciousness     Level of Consciousness intermittent confusion     Orientation person;situation;place

## 2025-01-20 LAB
ATRIAL RATE - MUSE: 88 BPM
DIASTOLIC BLOOD PRESSURE - MUSE: 57 MMHG
INTERPRETATION ECG - MUSE: NORMAL
P AXIS - MUSE: 68 DEGREES
PR INTERVAL - MUSE: 172 MS
QRS DURATION - MUSE: 96 MS
QT - MUSE: 368 MS
QTC - MUSE: 445 MS
R AXIS - MUSE: 141 DEGREES
SYSTOLIC BLOOD PRESSURE - MUSE: 121 MMHG
T AXIS - MUSE: 67 DEGREES
VENTRICULAR RATE- MUSE: 88 BPM

## 2025-01-20 NOTE — DISCHARGE INSTRUCTIONS
Take Tamiflu starting tomorrow as prescribed    Tylenol as need for fever, pain    Lots of fluids and rest

## 2025-02-02 DIAGNOSIS — J00 ACUTE RHINITIS: ICD-10-CM

## 2025-02-03 RX ORDER — CETIRIZINE HYDROCHLORIDE 10 MG/1
10 TABLET ORAL DAILY
Qty: 30 TABLET | Refills: 3 | Status: SHIPPED | OUTPATIENT
Start: 2025-02-03

## 2025-04-10 ENCOUNTER — OFFICE VISIT (OUTPATIENT)
Dept: PULMONOLOGY | Facility: CLINIC | Age: 80
End: 2025-04-10
Attending: INTERNAL MEDICINE
Payer: MEDICARE

## 2025-04-10 VITALS
OXYGEN SATURATION: 93 % | DIASTOLIC BLOOD PRESSURE: 58 MMHG | WEIGHT: 181.8 LBS | BODY MASS INDEX: 30.25 KG/M2 | SYSTOLIC BLOOD PRESSURE: 112 MMHG | HEART RATE: 75 BPM

## 2025-04-10 DIAGNOSIS — J44.9 CHRONIC OBSTRUCTIVE PULMONARY DISEASE, UNSPECIFIED COPD TYPE (H): Primary | ICD-10-CM

## 2025-04-10 DIAGNOSIS — J43.9 PULMONARY EMPHYSEMA, UNSPECIFIED EMPHYSEMA TYPE (H): ICD-10-CM

## 2025-04-10 PROBLEM — J44.1 COPD WITH ACUTE EXACERBATION (H): Status: RESOLVED | Noted: 2024-10-25 | Resolved: 2025-04-10

## 2025-04-10 PROBLEM — A41.9 SEPSIS, DUE TO UNSPECIFIED ORGANISM, UNSPECIFIED WHETHER ACUTE ORGAN DYSFUNCTION PRESENT (H): Status: RESOLVED | Noted: 2024-12-08 | Resolved: 2025-04-10

## 2025-04-10 PROBLEM — J44.1 COPD EXACERBATION (H): Status: RESOLVED | Noted: 2024-12-08 | Resolved: 2025-04-10

## 2025-04-10 PROBLEM — J96.21 ACUTE ON CHRONIC RESPIRATORY FAILURE WITH HYPOXIA AND HYPERCAPNIA (H): Status: RESOLVED | Noted: 2024-10-25 | Resolved: 2025-04-10

## 2025-04-10 PROBLEM — J96.22 ACUTE ON CHRONIC RESPIRATORY FAILURE WITH HYPOXIA AND HYPERCAPNIA (H): Status: RESOLVED | Noted: 2024-10-25 | Resolved: 2025-04-10

## 2025-04-10 RX ORDER — OXYBUTYNIN CHLORIDE 5 MG/1
5 TABLET ORAL PRN
COMMUNITY

## 2025-04-10 RX ORDER — LEVOFLOXACIN 250 MG/1
250 TABLET, FILM COATED ORAL DAILY
COMMUNITY
Start: 2024-12-18 | End: 2025-04-10

## 2025-04-10 RX ORDER — FLUTICASONE FUROATE, UMECLIDINIUM BROMIDE AND VILANTEROL TRIFENATATE 200; 62.5; 25 UG/1; UG/1; UG/1
1 POWDER RESPIRATORY (INHALATION) DAILY
Qty: 60 EACH | Refills: 11 | Status: SHIPPED | OUTPATIENT
Start: 2025-04-10

## 2025-04-10 RX ORDER — SODIUM BICARBONATE 650 MG/1
650 TABLET ORAL 2 TIMES DAILY
COMMUNITY
Start: 2024-12-13

## 2025-04-10 RX ORDER — SULFAMETHOXAZOLE AND TRIMETHOPRIM 800; 160 MG/1; MG/1
1 TABLET ORAL
COMMUNITY
Start: 2025-01-10 | End: 2025-04-10

## 2025-04-10 NOTE — PROGRESS NOTES
Pulmonary Clinic Follow-up Visit    Assessment:  79yoM with 80 pack year smoking history, GOLD A COPD here for follow up.         Plan:  Not eligible for screening--quit >15 years ago and age 79.  Continue Trelegy, refilled for 1 year  No change in Lasix, euvolemic today  Action plan in place, will call with exacerbation  Patient expressed how grateful he was for Dr. Hampton's medication adjustments and credits no further hospitalizations with this.       Swati Bravo MD  Pulmonary/Critical Care    ----------------------------    CCx: COPD    HPI: 79yoM with COPD and former smoker who was initially established after hospitalization for NURY pneumonia previously patient of Dr. Escobar here for follow up.     Hospitalized October 2024 for COPD exacerbation requiring bipap but weaned to room air.     Hospitalized 12/8 with COPD exacerbation causing paroxysmal afib. Then in January 2025 had influenza A.    Since then his breathing has been significantly improved. He was pleased with Dr. Hampton and the changes he made to the medication.       Current Regimen: Trelegy  CAT: previously 26: 1+1+1+1+1+1+1+3=10    ROS:  12-point review performed and notable for  Shortness of breath and leg swelling. The remainder reviewed and negative.       Current Meds:  Current Outpatient Medications   Medication Sig Dispense Refill    acetaminophen (TYLENOL) 500 MG tablet Take 1-2 tablets (500-1,000 mg) by mouth every 6 hours as needed for mild pain 20 tablet 1    albuterol (PROAIR HFA/PROVENTIL HFA/VENTOLIN HFA) 108 (90 Base) MCG/ACT inhaler Inhale 2 puffs into the lungs every 4 hours as needed for shortness of breath, wheezing or cough. 18 g 3    albuterol (PROVENTIL) (2.5 MG/3ML) 0.083% neb solution Take 1 vial (2.5 mg) by nebulization every 6 hours as needed for shortness of breath, wheezing or cough. 180 mL 5    apixaban ANTICOAGULANT (ELIQUIS) 5 MG tablet Take 1 tablet (5 mg) by mouth 2 times daily. 60 tablet 2    cetirizine (ZYRTEC)  10 MG tablet TAKE 1 TABLET(10 MG) BY MOUTH DAILY 30 tablet 3    empagliflozin (JARDIANCE) 25 MG TABS tablet Take 25 mg by mouth daily.      fluticasone (FLONASE) 50 MCG/ACT nasal spray Spray 1 spray into both nostrils daily as needed for rhinitis or allergies.      Fluticasone-Umeclidin-Vilanterol (TRELEGY ELLIPTA) 200-62.5-25 MCG/ACT oral inhaler Inhale 1 puff into the lungs daily. 60 each 3    furosemide (LASIX) 20 MG tablet Take 1 tablet (20 mg) by mouth daily. 30 tablet 3    insulin glargine (LANTUS PEN) 100 UNIT/ML pen Inject 40 Units subcutaneously at bedtime.      magnesium 250 MG tablet Take 250 mg by mouth at bedtime.      metFORMIN (GLUCOPHAGE) 500 MG tablet [METFORMIN (GLUCOPHAGE) 500 MG TABLET] Take 1,000 mg by mouth 2 (two) times a day with meals.      metoprolol succinate ER (TOPROL XL) 50 MG 24 hr tablet Take 0.5 tablets (25 mg) by mouth daily. 30 tablet 3    primidone (MYSOLINE) 50 MG tablet [PRIMIDONE (MYSOLINE) 50 MG TABLET] Take 50 mg by mouth at bedtime.      simvastatin (ZOCOR) 40 MG tablet Take 40 mg by mouth every evening      tamsulosin (FLOMAX) 0.4 MG capsule Take 0.4 mg by mouth daily.         Physical Exam:  /58 (BP Location: Left arm, Patient Position: Sitting, Cuff Size: Adult Regular)   Pulse 75   Wt 82.5 kg (181 lb 12.8 oz)   SpO2 93%   BMI 30.25 kg/m    Gen: alert, oriented, no distress  HEENT: no lymphadenopathy  Neck: Trachea midline, No Accessory muscle use  CV: RRR, no M/G/R  Resp: decreased breath sounds bilaterally but clear.   Abd: soft, nontender, no palpable organomegaly  Skin: no apparent rashes  Ext: no cyanosis, clubbing. Minimal lower extremity edema.   Neuro: alert, nonfocal    Labs:  CBC RESULTS:   Recent Labs   Lab Test 07/09/19  1428 05/17/19  0710   WBC  --  8.2   RBC  --  4.84   HGB 15.5 14.0   HCT  --  42.9   MCV  --  89   MCH  --  28.9   MCHC  --  32.6   RDW  --  12.5   PLT  --  194     Sodium   Date Value Ref Range Status   01/19/2025 137 135 - 145  mmol/L Final   05/23/2019 140 133 - 144 mmol/L Final     Potassium   Date Value Ref Range Status   01/19/2025 4.6 3.4 - 5.3 mmol/L Final   12/10/2021 4.5 3.5 - 5.0 mmol/L Final   05/23/2019 4.3 3.4 - 5.3 mmol/L Final     Chloride   Date Value Ref Range Status   01/19/2025 98 98 - 107 mmol/L Final   12/10/2021 102 98 - 107 mmol/L Final   05/23/2019 107 94 - 109 mmol/L Final     Carbon Dioxide   Date Value Ref Range Status   05/23/2019 28 20 - 32 mmol/L Final     Carbon Dioxide (CO2)   Date Value Ref Range Status   01/19/2025 25 22 - 29 mmol/L Final   12/10/2021 28 22 - 31 mmol/L Final     Anion Gap   Date Value Ref Range Status   01/19/2025 14 7 - 15 mmol/L Final   12/10/2021 9 5 - 18 mmol/L Final   05/23/2019 5 3 - 14 mmol/L Final     Glucose   Date Value Ref Range Status   01/19/2025 187 (H) 70 - 99 mg/dL Final   12/10/2021 172 (H) 70 - 125 mg/dL Final   05/23/2019 103 (H) 70 - 99 mg/dL Final     GLUCOSE BY METER POCT   Date Value Ref Range Status   01/19/2025 267 (H) 70 - 99 mg/dL Final     Urea Nitrogen   Date Value Ref Range Status   01/19/2025 24.2 (H) 8.0 - 23.0 mg/dL Final   12/10/2021 10 8 - 28 mg/dL Final   05/23/2019 22 7 - 30 mg/dL Final     Creatinine   Date Value Ref Range Status   01/19/2025 1.15 0.67 - 1.17 mg/dL Final   05/23/2019 0.80 0.66 - 1.25 mg/dL Final     GFR Estimate   Date Value Ref Range Status   01/19/2025 64 >60 mL/min/1.73m2 Final     Comment:     eGFR calculated using 2021 CKD-EPI equation.   11/27/2020 >60 >60 mL/min/1.73m2 Final   05/23/2019 88 >60 mL/min/[1.73_m2] Final     Comment:     Non  GFR Calc  Starting 12/18/2018, serum creatinine based estimated GFR (eGFR) will be   calculated using the Chronic Kidney Disease Epidemiology Collaboration   (CKD-EPI) equation.       Calcium   Date Value Ref Range Status   01/19/2025 8.8 8.8 - 10.4 mg/dL Final     Comment:     Reference intervals for this test were updated on 7/16/2024 to reflect our healthy population more  accurately. There may be differences in the flagging of prior results with similar values performed with this method. Those prior results can be interpreted in the context of the updated reference intervals.   05/23/2019 8.6 8.5 - 10.1 mg/dL Final         Imaging studies:  Personally reviewed image/s and Personally reviewed impression/s  EXAM: CT CHEST PE STUDY   LOCATION: [facility]   DATE: 8/31/2023     INDICATION: Postoperative. Tachycardia. Leukocytosis. Pulmonary embolism (PE) suspected, high prob   COMPARISON: None.   TECHNIQUE: CT chest pulmonary angiogram during arterial phase injection of IV contrast. Multiplanar reformats and MIP reconstructions were performed. Dose reduction techniques were used.   CONTRAST: IOHEXOL 350 MG IODINE/ML  ML BOTTLE: 50mL     FINDINGS:   ANGIOGRAM CHEST: Pulmonary arteries are normal caliber and negative for pulmonary emboli. Thoracic aorta is negative for dissection. No CT evidence of right heart strain.     LUNGS AND PLEURA: Mild bandlike scarring in the upper lungs and lingula. Mild mucous plugging and bronchial wall thickening in the lower lungs. No focal consolidation or focal groundglass opacity. No pleural effusion or pneumothorax. No suspicious pulmonary nodule or mass.     MEDIASTINUM/AXILLAE: No adenopathy. No pericardial effusion.     CORONARY ARTERY CALCIFICATION: Severe.     UPPER ABDOMEN: Small benign simple bilateral renal cysts, no follow-up needed.     MUSCULOSKELETAL: No suspicious osseous lesion. Mild scattered degenerative changes in the spine.   Impression    1. Negative for pulmonary embolism.  2. No evidence of pneumonia.  3. Mild bronchial thickening and mucous plugging in the lower lungs suggesting bronchitis.          PFT's  FEV1/FVC is 61 and is reduced.  FEV1 is 1.34 L (52% predicted) and is reduced.  FVC is 2.21 L (64% predicted) and reduced.  There was no improvement in spirometry after a single inhaled dose of bronchodilator.  TLC  is 5.44 L (89% predicted) and is normal.  RV is 3.45 L (133% predicted) and is increased.  DLCO is 71 % predicted and is reduced when it was corrected for hemoglobin.  Impression: moderate obstruction without reversibility. Mildly reduced diffusion capacity

## 2025-04-24 ENCOUNTER — TELEPHONE (OUTPATIENT)
Dept: PULMONOLOGY | Facility: CLINIC | Age: 80
End: 2025-04-24
Payer: MEDICARE

## 2025-04-24 DIAGNOSIS — J44.9 CHRONIC OBSTRUCTIVE PULMONARY DISEASE, UNSPECIFIED COPD TYPE (H): ICD-10-CM

## 2025-04-24 RX ORDER — ALBUTEROL SULFATE 0.83 MG/ML
2.5 SOLUTION RESPIRATORY (INHALATION) EVERY 6 HOURS PRN
Qty: 180 ML | Refills: 5 | Status: SHIPPED | OUTPATIENT
Start: 2025-04-24

## 2025-04-24 NOTE — TELEPHONE ENCOUNTER
[facility] Call Center    Phone Message    May a detailed message be left on voicemail: yes     Reason for Call: Medication Refill Request    Has the patient contacted the pharmacy for the refill? Yes   Name of medication being requested: albuterol (PROVENTIL) (2.5 MG/3ML) 0.083% neb solution   Provider who prescribed the medication: Shelly, [place],   Pharmacy: [id number] [place] [address] [place] [address] & [facility]   [phone]   Date medication is needed: 04/24/25    Action Taken: Message routed to:  Other: pulm    Travel Screening: Not Applicable     Date of Service:04/24/25

## 2025-05-08 ENCOUNTER — MEDICAL CORRESPONDENCE (OUTPATIENT)
Dept: HEALTH INFORMATION MANAGEMENT | Facility: CLINIC | Age: 80
End: 2025-05-08
Payer: MEDICARE

## 2025-06-07 DIAGNOSIS — J00 ACUTE RHINITIS: ICD-10-CM

## 2025-06-09 RX ORDER — CETIRIZINE HYDROCHLORIDE 10 MG/1
10 TABLET ORAL DAILY
Qty: 30 TABLET | Refills: 5 | Status: SHIPPED | OUTPATIENT
Start: 2025-06-09

## 2025-07-06 ENCOUNTER — LAB REQUISITION (OUTPATIENT)
Dept: LAB | Facility: CLINIC | Age: 80
End: 2025-07-06
Payer: MEDICARE

## 2025-07-06 DIAGNOSIS — R30.0 DYSURIA: ICD-10-CM

## 2025-07-06 PROCEDURE — 87186 SC STD MICRODIL/AGAR DIL: CPT | Mod: ORL | Performed by: FAMILY MEDICINE

## 2025-07-08 LAB — BACTERIA UR CULT: ABNORMAL

## 2025-08-07 ENCOUNTER — TELEPHONE (OUTPATIENT)
Dept: PULMONOLOGY | Facility: CLINIC | Age: 80
End: 2025-08-07
Payer: MEDICARE

## 2025-08-07 DIAGNOSIS — J44.9 CHRONIC OBSTRUCTIVE PULMONARY DISEASE, UNSPECIFIED COPD TYPE (H): Primary | ICD-10-CM

## 2025-08-07 RX ORDER — PREDNISONE 20 MG/1
TABLET ORAL
Qty: 10 TABLET | Refills: 0 | Status: SHIPPED | OUTPATIENT
Start: 2025-08-07

## 2025-08-07 RX ORDER — DOXYCYCLINE 100 MG/1
100 CAPSULE ORAL 2 TIMES DAILY
Qty: 10 CAPSULE | Refills: 0 | Status: SHIPPED | OUTPATIENT
Start: 2025-08-07 | End: 2025-08-12

## (undated) DEVICE — CUSTOM PACK CYSTO PREFERRED SOT5BCYHEA

## (undated) DEVICE — LOOP CUTTING 26FR FOR 30 DEG A22206C

## (undated) DEVICE — PREP DYNA-HEX 4% CHG SCRUB 4OZ BOTTLE MDS098710

## (undated) DEVICE — SUCTION MANIFOLD NEPTUNE 2 SYS 1 PORT 702-025-000

## (undated) DEVICE — LOOP CUTTING 24FR 30 DEG SCOP A22205C

## (undated) DEVICE — SOL WATER IRRIG 1000ML BOTTLE 2F7114

## (undated) DEVICE — ESU ELEC LOOP HF-RESECTION 24FR MED 30 W/CABLE WA22606S

## (undated) DEVICE — CATH FOLEY 22FR 5ML LUBRICATH LATEX 0165L22

## (undated) DEVICE — BAG URINARY DRAIN 2000ML LF 154002

## (undated) DEVICE — GLOVE BIOGEL PI ORTHOPRO SZ 7.5 47675

## (undated) DEVICE — MAT FLOOR SURGICAL 40X38 0702140238

## (undated) DEVICE — DRSG TELFA 3X4" 1050

## (undated) DEVICE — TUBING IRRIG TUR Y TYPE 96" LF 6543-01

## (undated) DEVICE — TUBING SET THERMEDX UROLOGY SGL USE LL0006

## (undated) DEVICE — TUBING SUCTION MEDI-VAC 1/4"X20' N620A - HE

## (undated) DEVICE — GOWN IMPERVIOUS BREATHABLE SMART XLG 89045

## (undated) DEVICE — CATH FOLEY 20FR 5ML LUBRICATH LATEX 0165L20

## (undated) DEVICE — SPONGE RAY-TEC 4X8" 7318

## (undated) DEVICE — TUBING SUCTION MEDI-VAC 1/4"X20' N620A

## (undated) DEVICE — CATH FOLEY 3WAY 24FR 30ML LUBRICATH LATEX 0167L24

## (undated) RX ORDER — FENTANYL CITRATE-0.9 % NACL/PF 10 MCG/ML
PLASTIC BAG, INJECTION (ML) INTRAVENOUS
Status: DISPENSED
Start: 2024-03-11

## (undated) RX ORDER — FENTANYL CITRATE 50 UG/ML
INJECTION, SOLUTION INTRAMUSCULAR; INTRAVENOUS
Status: DISPENSED
Start: 2024-03-11

## (undated) RX ORDER — KETOROLAC TROMETHAMINE 30 MG/ML
INJECTION, SOLUTION INTRAMUSCULAR; INTRAVENOUS
Status: DISPENSED
Start: 2024-03-11

## (undated) RX ORDER — FENTANYL CITRATE 50 UG/ML
INJECTION, SOLUTION INTRAMUSCULAR; INTRAVENOUS
Status: DISPENSED
Start: 2023-10-10

## (undated) RX ORDER — ONDANSETRON 2 MG/ML
INJECTION INTRAMUSCULAR; INTRAVENOUS
Status: DISPENSED
Start: 2024-03-11

## (undated) RX ORDER — PROPOFOL 10 MG/ML
INJECTION, EMULSION INTRAVENOUS
Status: DISPENSED
Start: 2024-03-11

## (undated) RX ORDER — DEXAMETHASONE SODIUM PHOSPHATE 10 MG/ML
INJECTION, SOLUTION INTRAMUSCULAR; INTRAVENOUS
Status: DISPENSED
Start: 2024-03-11